# Patient Record
Sex: FEMALE | URBAN - METROPOLITAN AREA
[De-identification: names, ages, dates, MRNs, and addresses within clinical notes are randomized per-mention and may not be internally consistent; named-entity substitution may affect disease eponyms.]

---

## 2017-11-18 ENCOUNTER — EMERGENCY (EMERGENCY)
Facility: HOSPITAL | Age: 62
LOS: 1 days | Discharge: HOME | End: 2017-11-18
Attending: EMERGENCY MEDICINE
Payer: MEDICARE

## 2017-11-18 VITALS
DIASTOLIC BLOOD PRESSURE: 71 MMHG | WEIGHT: 149.91 LBS | SYSTOLIC BLOOD PRESSURE: 127 MMHG | TEMPERATURE: 98 F | HEART RATE: 66 BPM | RESPIRATION RATE: 16 BRPM | HEIGHT: 66 IN | OXYGEN SATURATION: 97 %

## 2017-11-18 DIAGNOSIS — F17.200 NICOTINE DEPENDENCE, UNSPECIFIED, UNCOMPLICATED: ICD-10-CM

## 2017-11-18 DIAGNOSIS — R55 SYNCOPE AND COLLAPSE: ICD-10-CM

## 2017-11-18 DIAGNOSIS — R42 DIZZINESS AND GIDDINESS: ICD-10-CM

## 2017-11-18 PROCEDURE — 99285 EMERGENCY DEPT VISIT HI MDM: CPT | Mod: 25

## 2017-11-19 VITALS
HEART RATE: 54 BPM | RESPIRATION RATE: 18 BRPM | DIASTOLIC BLOOD PRESSURE: 55 MMHG | SYSTOLIC BLOOD PRESSURE: 158 MMHG | TEMPERATURE: 98 F | OXYGEN SATURATION: 98 %

## 2017-11-19 LAB
ALBUMIN SERPL ELPH-MCNC: 3.4 G/DL — LOW (ref 3.5–5)
ALP SERPL-CCNC: 59 U/L — SIGNIFICANT CHANGE UP (ref 40–120)
ALT FLD-CCNC: 29 U/L DA — SIGNIFICANT CHANGE UP (ref 10–60)
ANION GAP SERPL CALC-SCNC: 10 MMOL/L — SIGNIFICANT CHANGE UP (ref 5–17)
APTT BLD: 30.5 SEC — SIGNIFICANT CHANGE UP (ref 27.5–37.4)
AST SERPL-CCNC: 22 U/L — SIGNIFICANT CHANGE UP (ref 10–40)
BASOPHILS # BLD AUTO: 0.1 K/UL — SIGNIFICANT CHANGE UP (ref 0–0.2)
BASOPHILS NFR BLD AUTO: 1.2 % — SIGNIFICANT CHANGE UP (ref 0–2)
BILIRUB SERPL-MCNC: 0.1 MG/DL — LOW (ref 0.2–1.2)
BUN SERPL-MCNC: 15 MG/DL — SIGNIFICANT CHANGE UP (ref 7–18)
CALCIUM SERPL-MCNC: 9.5 MG/DL — SIGNIFICANT CHANGE UP (ref 8.4–10.5)
CHLORIDE SERPL-SCNC: 105 MMOL/L — SIGNIFICANT CHANGE UP (ref 96–108)
CO2 SERPL-SCNC: 27 MMOL/L — SIGNIFICANT CHANGE UP (ref 22–31)
CREAT SERPL-MCNC: 0.95 MG/DL — SIGNIFICANT CHANGE UP (ref 0.5–1.3)
EOSINOPHIL # BLD AUTO: 0.3 K/UL — SIGNIFICANT CHANGE UP (ref 0–0.5)
EOSINOPHIL NFR BLD AUTO: 3.5 % — SIGNIFICANT CHANGE UP (ref 0–6)
GLUCOSE SERPL-MCNC: 116 MG/DL — HIGH (ref 70–99)
HCT VFR BLD CALC: 44.5 % — SIGNIFICANT CHANGE UP (ref 34.5–45)
HGB BLD-MCNC: 14.3 G/DL — SIGNIFICANT CHANGE UP (ref 11.5–15.5)
INR BLD: 1.05 RATIO — SIGNIFICANT CHANGE UP (ref 0.88–1.16)
LYMPHOCYTES # BLD AUTO: 2.5 K/UL — SIGNIFICANT CHANGE UP (ref 1–3.3)
LYMPHOCYTES # BLD AUTO: 32.7 % — SIGNIFICANT CHANGE UP (ref 13–44)
MCHC RBC-ENTMCNC: 30 PG — SIGNIFICANT CHANGE UP (ref 27–34)
MCHC RBC-ENTMCNC: 32.1 GM/DL — SIGNIFICANT CHANGE UP (ref 32–36)
MCV RBC AUTO: 93.3 FL — SIGNIFICANT CHANGE UP (ref 80–100)
MONOCYTES # BLD AUTO: 0.3 K/UL — SIGNIFICANT CHANGE UP (ref 0–0.9)
MONOCYTES NFR BLD AUTO: 4.3 % — SIGNIFICANT CHANGE UP (ref 2–14)
NEUTROPHILS # BLD AUTO: 4.4 K/UL — SIGNIFICANT CHANGE UP (ref 1.8–7.4)
NEUTROPHILS NFR BLD AUTO: 58.4 % — SIGNIFICANT CHANGE UP (ref 43–77)
PLATELET # BLD AUTO: 275 K/UL — SIGNIFICANT CHANGE UP (ref 150–400)
POTASSIUM SERPL-MCNC: 4 MMOL/L — SIGNIFICANT CHANGE UP (ref 3.5–5.3)
POTASSIUM SERPL-SCNC: 4 MMOL/L — SIGNIFICANT CHANGE UP (ref 3.5–5.3)
PROT SERPL-MCNC: 7.5 G/DL — SIGNIFICANT CHANGE UP (ref 6–8.3)
PROTHROM AB SERPL-ACNC: 11.5 SEC — SIGNIFICANT CHANGE UP (ref 9.8–12.7)
RBC # BLD: 4.77 M/UL — SIGNIFICANT CHANGE UP (ref 3.8–5.2)
RBC # FLD: 12.9 % — SIGNIFICANT CHANGE UP (ref 10.3–14.5)
SODIUM SERPL-SCNC: 142 MMOL/L — SIGNIFICANT CHANGE UP (ref 135–145)
WBC # BLD: 7.6 K/UL — SIGNIFICANT CHANGE UP (ref 3.8–10.5)
WBC # FLD AUTO: 7.6 K/UL — SIGNIFICANT CHANGE UP (ref 3.8–10.5)

## 2017-11-19 PROCEDURE — 36415 COLL VENOUS BLD VENIPUNCTURE: CPT

## 2017-11-19 PROCEDURE — 85027 COMPLETE CBC AUTOMATED: CPT

## 2017-11-19 PROCEDURE — 93005 ELECTROCARDIOGRAM TRACING: CPT

## 2017-11-19 PROCEDURE — 99284 EMERGENCY DEPT VISIT MOD MDM: CPT | Mod: 25

## 2017-11-19 PROCEDURE — 80053 COMPREHEN METABOLIC PANEL: CPT

## 2017-11-19 PROCEDURE — 70450 CT HEAD/BRAIN W/O DYE: CPT | Mod: 26

## 2017-11-19 PROCEDURE — 85730 THROMBOPLASTIN TIME PARTIAL: CPT

## 2017-11-19 PROCEDURE — 70450 CT HEAD/BRAIN W/O DYE: CPT

## 2017-11-19 PROCEDURE — 85610 PROTHROMBIN TIME: CPT

## 2017-11-19 RX ORDER — DIAZEPAM 5 MG
5 TABLET ORAL ONCE
Qty: 0 | Refills: 0 | Status: DISCONTINUED | OUTPATIENT
Start: 2017-11-19 | End: 2017-11-19

## 2017-11-19 RX ADMIN — Medication 5 MILLIGRAM(S): at 03:03

## 2017-11-19 NOTE — ED PROVIDER NOTE - NS ED MD EM SELECTION
Hermann Webb is a 70 year old male presenting for   Chief Complaint   Patient presents with   • Medicare Wellness Visit     Subsequent- Last Colonoscopy 2009   • Physical     routine physical- no new concerns today     Denies Latex allergy or sensitivity.    Medication verified and med list updated  Refills needed today: No    Health Maintenance Summary     Topic Due On Due Status Completed On Postpone Until Reason    Colorectal Cancer Screening - Colonoscopy Aug 28, 2019 Not Due Aug 28, 2009      Immunization - Td/Tdap Feb [birth year] 1965 Postponed  Mar 7, 2018 Insurance or Financial    Immunization-Zoster  Completed Nov 22, 2013      Immunization - Pneumococcal  Completed Mar 7, 2016      Abdominal Aortic Aneurysm (AAA) Screening   Completed Feb 26, 2014      Medicare Wellness Visit Mar 7, 2017 Due On Mar 7, 2016      Immunization-Influenza  Completed Oct 31, 2016          Patient is up to date, no discussion needed.    Last lab results:   No results found for: HGBA1C  CHOLESTEROL (mg/dL)   Date Value   10/07/2014 212 (H)     HDL (mg/dL)   Date Value   10/07/2014 50     TRIGLYCERIDE (mg/dL)   Date Value   10/07/2014 80     CALCULATED LDL (mg/dL)   Date Value   10/07/2014 146 (H)     No results found for: URMIC, UCR, MALBCR  No results found for: IFOB         [id number] Detailed

## 2017-11-19 NOTE — ED PROVIDER NOTE - PROGRESS NOTE DETAILS
Candis QUIÑONEZ: Patient reassessed and reports she feels better and is ready to go home. Patient will follow up with ENT.

## 2017-11-19 NOTE — ED PROVIDER NOTE - OBJECTIVE STATEMENT
Candis QUIÑONEZ: 61 y/o female with hx of Sarcoidosis with multiple organ involvement here with vertigo and syncope. Patient reports about 2 weeks of recurrent vertigo followed up by passing out. Patient reports chronic hx of L otitis media for which she has seen her PMD in the past. Patient reports her symptoms begin with some L ear discomfort that progresses to vertigo. Patient was prescribed Meclizine and is not helping. Denies fever, HA, neck pain, ataxia, fall, head trauma, N/V/D, focal weakness, visual changes, or paresthesias. Exam shows a female with EOMI, PERRL. Normal orpharynx, Clear lungs and CN II - XII intact. No Ataxia noted. Consider BPPV, Menieres disease, IC tumor, Electolyte abnormality, Cardiac arrthythmia. Plan CBC, CMP, CT head and EKG and reassess. Candis QUIÑONEZ: 63 y/o female with hx of Sarcoidosis with multiple organ involvement here with vertigo and syncope. Patient reports about 2 weeks of recurrent vertigo followed up by passing out. Patient reports chronic hx of L otitis media for which she has seen her PMD in the past. Patient reports her symptoms begin with some L ear discomfort that progresses to vertigo. Patient was prescribed Meclizine and is not helping. Denies fever, HA, neck pain, ataxia, fall, head trauma, N/V/D, focal weakness, visual changes, or paresthesias. Exam shows a female with EOMI, PERRL. Normal oropharynx, Clear lungs and CN II - XII intact. No Ataxia noted. Consider BPPV, Menieres disease, IC tumor, Electolyte abnormality, Cardiac arrthythmia. Plan CBC, CMP, CT head and EKG and reassess.

## 2017-11-19 NOTE — ED PROVIDER NOTE - MEDICAL DECISION MAKING DETAILS
Candis QUIÑONEZ: 61 y/o female with hx of Sarcoidosis with multiple organ involvement here with vertigo and syncope. Patient reports about 2 weeks of recurrent vertigo followed up by passing out. Patient reports chronic hx of L otitis media for which she has seen her PMD in the past. Patient reports her symptoms begin with some L ear discomfort that progresses to vertigo. Patient was prescribed Meclizine and is not helping. Denies fever, HA, neck pain, ataxia, fall, head trauma, N/V/D, focal weakness, visual changes, or paresthesias. Exam shows a female with EOMI, PERRL. Normal orpharynx, Clear lungs and CN II - XII intact. No Ataxia noted. Consider BPPV, Menieres disease, IC tumor, Electolyte abnormality, Cardiac arrthythmia. Plan CBC, CMP, CT head and EKG and reassess.

## 2018-01-04 ENCOUNTER — APPOINTMENT (OUTPATIENT)
Dept: OPHTHALMOLOGY | Facility: CLINIC | Age: 63
End: 2018-01-04

## 2018-01-09 ENCOUNTER — APPOINTMENT (OUTPATIENT)
Dept: OPHTHALMOLOGY | Facility: CLINIC | Age: 63
End: 2018-01-09
Payer: MEDICARE

## 2018-01-09 PROCEDURE — 92083 EXTENDED VISUAL FIELD XM: CPT

## 2018-01-09 PROCEDURE — 92004 COMPRE OPH EXAM NEW PT 1/>: CPT

## 2018-01-09 PROCEDURE — 92134 CPTRZ OPH DX IMG PST SGM RTA: CPT

## 2018-03-29 ENCOUNTER — APPOINTMENT (OUTPATIENT)
Dept: OPHTHALMOLOGY | Facility: CLINIC | Age: 63
End: 2018-03-29

## 2018-06-14 ENCOUNTER — APPOINTMENT (OUTPATIENT)
Dept: OPHTHALMOLOGY | Facility: CLINIC | Age: 63
End: 2018-06-14

## 2018-08-03 ENCOUNTER — EMERGENCY (EMERGENCY)
Facility: HOSPITAL | Age: 63
LOS: 1 days | Discharge: HOME | End: 2018-08-03
Attending: EMERGENCY MEDICINE
Payer: MEDICARE

## 2018-08-03 VITALS
DIASTOLIC BLOOD PRESSURE: 84 MMHG | TEMPERATURE: 98 F | RESPIRATION RATE: 16 BRPM | WEIGHT: 149.91 LBS | HEART RATE: 95 BPM | SYSTOLIC BLOOD PRESSURE: 150 MMHG | OXYGEN SATURATION: 99 % | HEIGHT: 66 IN

## 2018-08-03 PROCEDURE — 99282 EMERGENCY DEPT VISIT SF MDM: CPT

## 2018-08-03 NOTE — ED PROVIDER NOTE - LEFT EAR
earlobe to L side has clean/dry/healed split laceration to lobe.  No bleeding, no swelling, no redness.

## 2018-08-03 NOTE — ED PROVIDER NOTE - OBJECTIVE STATEMENT
63 F requesting closure of her split earlobe.  Patient unsure when it fully split, split where her earing was located.  Patient denies any bleeding.  No other complaints.

## 2018-08-04 PROBLEM — D86.9 SARCOIDOSIS, UNSPECIFIED: Chronic | Status: ACTIVE | Noted: 2017-11-24

## 2018-08-25 ENCOUNTER — INPATIENT (INPATIENT)
Facility: HOSPITAL | Age: 63
LOS: 12 days | Discharge: HOME HEALTH | End: 2018-09-07
Attending: INTERNAL MEDICINE | Admitting: INTERNAL MEDICINE
Payer: MEDICARE

## 2018-08-25 ENCOUNTER — RESULT REVIEW (OUTPATIENT)
Age: 63
End: 2018-08-25

## 2018-08-25 VITALS — WEIGHT: 154.32 LBS

## 2018-08-25 DIAGNOSIS — J96.90 RESPIRATORY FAILURE, UNSPECIFIED, UNSPECIFIED WHETHER WITH HYPOXIA OR HYPERCAPNIA: ICD-10-CM

## 2018-08-25 LAB
ALBUMIN SERPL ELPH-MCNC: 3.7 G/DL — SIGNIFICANT CHANGE UP (ref 3.3–5)
ALP SERPL-CCNC: 63 U/L — SIGNIFICANT CHANGE UP (ref 40–120)
ALT FLD-CCNC: 20 U/L — SIGNIFICANT CHANGE UP (ref 4–33)
ANISOCYTOSIS BLD QL: SLIGHT — SIGNIFICANT CHANGE UP
APAP SERPL-MCNC: < 15 UG/ML — LOW (ref 15–25)
APPEARANCE UR: CLEAR — SIGNIFICANT CHANGE UP
APTT BLD: 31.6 SEC — SIGNIFICANT CHANGE UP (ref 27.5–37.4)
AST SERPL-CCNC: 53 U/L — HIGH (ref 4–32)
BASE EXCESS BLDA CALC-SCNC: -2.5 MMOL/L — SIGNIFICANT CHANGE UP
BASE EXCESS BLDA CALC-SCNC: -4.7 MMOL/L — SIGNIFICANT CHANGE UP
BASE EXCESS BLDV CALC-SCNC: -4.1 MMOL/L — SIGNIFICANT CHANGE UP
BASE EXCESS BLDV CALC-SCNC: -5.1 MMOL/L — SIGNIFICANT CHANGE UP
BASOPHILS # BLD AUTO: 0.09 K/UL — SIGNIFICANT CHANGE UP (ref 0–0.2)
BASOPHILS NFR BLD AUTO: 0.8 % — SIGNIFICANT CHANGE UP (ref 0–2)
BASOPHILS NFR SPEC: 0 % — SIGNIFICANT CHANGE UP (ref 0–2)
BILIRUB SERPL-MCNC: 0.2 MG/DL — SIGNIFICANT CHANGE UP (ref 0.2–1.2)
BILIRUB UR-MCNC: NEGATIVE — SIGNIFICANT CHANGE UP
BLOOD GAS VENOUS - CREATININE: 1.12 MG/DL — SIGNIFICANT CHANGE UP (ref 0.5–1.3)
BLOOD GAS VENOUS - CREATININE: 1.16 MG/DL — SIGNIFICANT CHANGE UP (ref 0.5–1.3)
BLOOD UR QL VISUAL: SIGNIFICANT CHANGE UP
BODY FLUID TYPE: SIGNIFICANT CHANGE UP
BUN SERPL-MCNC: 16 MG/DL — SIGNIFICANT CHANGE UP (ref 7–23)
CA-I BLDA-SCNC: 1.14 MMOL/L — LOW (ref 1.15–1.29)
CALCIUM SERPL-MCNC: 9.2 MG/DL — SIGNIFICANT CHANGE UP (ref 8.4–10.5)
CHLORIDE BLDV-SCNC: 105 MMOL/L — SIGNIFICANT CHANGE UP (ref 96–108)
CHLORIDE BLDV-SCNC: 110 MMOL/L — HIGH (ref 96–108)
CHLORIDE SERPL-SCNC: 103 MMOL/L — SIGNIFICANT CHANGE UP (ref 98–107)
CLARITY SPEC: SIGNIFICANT CHANGE UP
CO2 SERPL-SCNC: 18 MMOL/L — LOW (ref 22–31)
COLOR FLD: SIGNIFICANT CHANGE UP
COLOR SPEC: YELLOW — SIGNIFICANT CHANGE UP
CORTIS SERPL-MCNC: 15.5 UG/DL — SIGNIFICANT CHANGE UP (ref 2.7–18.4)
CREAT SERPL-MCNC: 1.09 MG/DL — SIGNIFICANT CHANGE UP (ref 0.5–1.3)
EOSINOPHIL # BLD AUTO: 0.69 K/UL — HIGH (ref 0–0.5)
EOSINOPHIL NFR BLD AUTO: 6.1 % — HIGH (ref 0–6)
EOSINOPHIL NFR FLD: 5 % — SIGNIFICANT CHANGE UP (ref 0–6)
ETHANOL BLD-MCNC: < 10 MG/DL — SIGNIFICANT CHANGE UP
GAS PNL BLDV: 137 MMOL/L — SIGNIFICANT CHANGE UP (ref 136–146)
GAS PNL BLDV: 140 MMOL/L — SIGNIFICANT CHANGE UP (ref 136–146)
GIANT PLATELETS BLD QL SMEAR: PRESENT — SIGNIFICANT CHANGE UP
GLUCOSE BLDA-MCNC: 105 MG/DL — HIGH (ref 70–99)
GLUCOSE BLDA-MCNC: 112 MG/DL — HIGH (ref 70–99)
GLUCOSE BLDV-MCNC: 134 — HIGH (ref 70–99)
GLUCOSE BLDV-MCNC: 308 — HIGH (ref 70–99)
GLUCOSE SERPL-MCNC: 268 MG/DL — HIGH (ref 70–99)
GLUCOSE UR-MCNC: 500 — SIGNIFICANT CHANGE UP
GRAM STN SPT: SIGNIFICANT CHANGE UP
GRAM STN SPT: SIGNIFICANT CHANGE UP
HCO3 BLDA-SCNC: 20 MMOL/L — LOW (ref 22–26)
HCO3 BLDA-SCNC: 22 MMOL/L — SIGNIFICANT CHANGE UP (ref 22–26)
HCO3 BLDV-SCNC: 15 MMOL/L — LOW (ref 20–27)
HCO3 BLDV-SCNC: 19 MMOL/L — LOW (ref 20–27)
HCT VFR BLD CALC: 45.2 % — HIGH (ref 34.5–45)
HCT VFR BLDA CALC: 34.1 % — LOW (ref 34.5–46.5)
HCT VFR BLDA CALC: 37.9 % — SIGNIFICANT CHANGE UP (ref 34.5–46.5)
HCT VFR BLDV CALC: 38.1 % — SIGNIFICANT CHANGE UP (ref 34.5–45)
HCT VFR BLDV CALC: 45.9 % — HIGH (ref 34.5–45)
HGB BLD-MCNC: 13.7 G/DL — SIGNIFICANT CHANGE UP (ref 11.5–15.5)
HGB BLDA-MCNC: 11.1 G/DL — LOW (ref 11.5–15.5)
HGB BLDA-MCNC: 12.3 G/DL — SIGNIFICANT CHANGE UP (ref 11.5–15.5)
HGB BLDV-MCNC: 12.4 G/DL — SIGNIFICANT CHANGE UP (ref 11.5–15.5)
HGB BLDV-MCNC: 15 G/DL — SIGNIFICANT CHANGE UP (ref 11.5–15.5)
HYALINE CASTS # UR AUTO: SIGNIFICANT CHANGE UP
IMM GRANULOCYTES # BLD AUTO: 0.04 # — SIGNIFICANT CHANGE UP
IMM GRANULOCYTES NFR BLD AUTO: 0.4 % — SIGNIFICANT CHANGE UP (ref 0–1.5)
INR BLD: 1.02 — SIGNIFICANT CHANGE UP (ref 0.88–1.17)
KETONES UR-MCNC: NEGATIVE — SIGNIFICANT CHANGE UP
LACTATE BLDA-SCNC: 1.7 MMOL/L — SIGNIFICANT CHANGE UP (ref 0.5–2)
LACTATE BLDA-SCNC: 1.8 MMOL/L — SIGNIFICANT CHANGE UP (ref 0.5–2)
LACTATE BLDV-MCNC: 3 MMOL/L — HIGH (ref 0.5–2)
LACTATE BLDV-MCNC: 3.6 MMOL/L — HIGH (ref 0.5–2)
LEUKOCYTE ESTERASE UR-ACNC: SIGNIFICANT CHANGE UP
LYMPHOCYTES # BLD AUTO: 5.72 K/UL — HIGH (ref 1–3.3)
LYMPHOCYTES # BLD AUTO: 50.9 % — HIGH (ref 13–44)
LYMPHOCYTES NFR FLD: 11 % — SIGNIFICANT CHANGE UP
LYMPHOCYTES NFR SPEC AUTO: 47 % — HIGH (ref 13–44)
MACROPHAGES # FLD: 7 % — SIGNIFICANT CHANGE UP
MANUAL SMEAR VERIFICATION: SIGNIFICANT CHANGE UP
MCHC RBC-ENTMCNC: 29.4 PG — SIGNIFICANT CHANGE UP (ref 27–34)
MCHC RBC-ENTMCNC: 30.3 % — LOW (ref 32–36)
MCV RBC AUTO: 97 FL — SIGNIFICANT CHANGE UP (ref 80–100)
MONOCYTES # BLD AUTO: 1.14 K/UL — HIGH (ref 0–0.9)
MONOCYTES # FLD: 5 % — SIGNIFICANT CHANGE UP
MONOCYTES NFR BLD AUTO: 10.2 % — SIGNIFICANT CHANGE UP (ref 2–14)
MONOCYTES NFR BLD: 12 % — HIGH (ref 2–9)
MUCOUS THREADS # UR AUTO: SIGNIFICANT CHANGE UP
NEUTROPHIL AB SER-ACNC: 34 % — LOW (ref 43–77)
NEUTROPHILS # BLD AUTO: 3.55 K/UL — SIGNIFICANT CHANGE UP (ref 1.8–7.4)
NEUTROPHILS NFR BLD AUTO: 31.6 % — LOW (ref 43–77)
NEUTS SEG NFR FLD MANUAL: 77 % — SIGNIFICANT CHANGE UP
NITRITE UR-MCNC: NEGATIVE — SIGNIFICANT CHANGE UP
NRBC # BLD: 0 /100WBC — SIGNIFICANT CHANGE UP
NRBC # FLD: 0 — SIGNIFICANT CHANGE UP
NT-PROBNP SERPL-SCNC: 701.6 PG/ML — SIGNIFICANT CHANGE UP
OTHER CELLS FLD MANUAL: SIGNIFICANT CHANGE UP %
PCO2 BLDA: 42 MMHG — SIGNIFICANT CHANGE UP (ref 32–48)
PCO2 BLDA: 51 MMHG — HIGH (ref 32–48)
PCO2 BLDV: 77 MMHG — HIGH (ref 41–51)
PCO2 BLDV: > 110 MMHG — CRITICAL HIGH (ref 41–51)
PH BLDA: 7.25 PH — LOW (ref 7.35–7.45)
PH BLDA: 7.34 PH — LOW (ref 7.35–7.45)
PH BLDV: 6.97 PH — CRITICAL LOW (ref 7.32–7.43)
PH BLDV: 7.12 PH — CRITICAL LOW (ref 7.32–7.43)
PH UR: 5.5 — SIGNIFICANT CHANGE UP (ref 5–8)
PLATELET # BLD AUTO: 339 K/UL — SIGNIFICANT CHANGE UP (ref 150–400)
PLATELET COUNT - ESTIMATE: NORMAL — SIGNIFICANT CHANGE UP
PMV BLD: 9.5 FL — SIGNIFICANT CHANGE UP (ref 7–13)
PO2 BLDA: 163 MMHG — HIGH (ref 83–108)
PO2 BLDA: 73 MMHG — LOW (ref 83–108)
PO2 BLDV: 27 MMHG — LOW (ref 35–40)
PO2 BLDV: 49 MMHG — HIGH (ref 35–40)
POTASSIUM BLDA-SCNC: 3.9 MMOL/L — SIGNIFICANT CHANGE UP (ref 3.4–4.5)
POTASSIUM BLDA-SCNC: 5.2 MMOL/L — HIGH (ref 3.4–4.5)
POTASSIUM BLDV-SCNC: 4.1 MMOL/L — SIGNIFICANT CHANGE UP (ref 3.4–4.5)
POTASSIUM BLDV-SCNC: 6.3 MMOL/L — CRITICAL HIGH (ref 3.4–4.5)
POTASSIUM SERPL-MCNC: SIGNIFICANT CHANGE UP MMOL/L (ref 3.5–5.3)
POTASSIUM SERPL-SCNC: SIGNIFICANT CHANGE UP MMOL/L (ref 3.5–5.3)
PROT SERPL-MCNC: 7.5 G/DL — SIGNIFICANT CHANGE UP (ref 6–8.3)
PROT UR-MCNC: 30 — SIGNIFICANT CHANGE UP
PROTHROM AB SERPL-ACNC: 11.3 SEC — SIGNIFICANT CHANGE UP (ref 9.8–13.1)
RBC # BLD: 4.66 M/UL — SIGNIFICANT CHANGE UP (ref 3.8–5.2)
RBC # FLD: 14.1 % — SIGNIFICANT CHANGE UP (ref 10.3–14.5)
RBC CASTS # UR COMP ASSIST: SIGNIFICANT CHANGE UP (ref 0–?)
REVIEW TO FOLLOW: YES — SIGNIFICANT CHANGE UP
SALICYLATES SERPL-MCNC: < 5 MG/DL — LOW (ref 15–30)
SAO2 % BLDA: 95.4 % — SIGNIFICANT CHANGE UP (ref 95–99)
SAO2 % BLDA: 99.2 % — HIGH (ref 95–99)
SAO2 % BLDV: 21.2 % — LOW (ref 60–85)
SAO2 % BLDV: 70.3 % — SIGNIFICANT CHANGE UP (ref 60–85)
SODIUM BLDA-SCNC: 136 MMOL/L — SIGNIFICANT CHANGE UP (ref 136–146)
SODIUM BLDA-SCNC: 138 MMOL/L — SIGNIFICANT CHANGE UP (ref 136–146)
SODIUM SERPL-SCNC: 137 MMOL/L — SIGNIFICANT CHANGE UP (ref 135–145)
SP GR SPEC: 1.03 — SIGNIFICANT CHANGE UP (ref 1–1.04)
SPECIMEN SOURCE: SIGNIFICANT CHANGE UP
SPECIMEN SOURCE: SIGNIFICANT CHANGE UP
SQUAMOUS # UR AUTO: SIGNIFICANT CHANGE UP
TOTAL CELLS COUNTED, BODY FLUID: 100 CELLS — SIGNIFICANT CHANGE UP
TROPONIN T, HIGH SENSITIVITY: 13 NG/L — SIGNIFICANT CHANGE UP (ref ?–14)
UROBILINOGEN FLD QL: NORMAL — SIGNIFICANT CHANGE UP
VARIANT LYMPHS # BLD: 2 % — SIGNIFICANT CHANGE UP
WBC # BLD: 11.23 K/UL — HIGH (ref 3.8–10.5)
WBC # FLD AUTO: 11.23 K/UL — HIGH (ref 3.8–10.5)
WBC UR QL: SIGNIFICANT CHANGE UP (ref 0–?)

## 2018-08-25 PROCEDURE — 88112 CYTOPATH CELL ENHANCE TECH: CPT | Mod: 26

## 2018-08-25 PROCEDURE — 88305 TISSUE EXAM BY PATHOLOGIST: CPT | Mod: 26

## 2018-08-25 PROCEDURE — 71045 X-RAY EXAM CHEST 1 VIEW: CPT | Mod: 26

## 2018-08-25 PROCEDURE — 76937 US GUIDE VASCULAR ACCESS: CPT | Mod: 26

## 2018-08-25 PROCEDURE — 70450 CT HEAD/BRAIN W/O DYE: CPT | Mod: 26

## 2018-08-25 PROCEDURE — 31622 DX BRONCHOSCOPE/WASH: CPT

## 2018-08-25 PROCEDURE — 36010 PLACE CATHETER IN VEIN: CPT

## 2018-08-25 PROCEDURE — 99291 CRITICAL CARE FIRST HOUR: CPT

## 2018-08-25 RX ORDER — ETOMIDATE 2 MG/ML
20 INJECTION INTRAVENOUS ONCE
Qty: 0 | Refills: 0 | Status: COMPLETED | OUTPATIENT
Start: 2018-08-25 | End: 2018-08-25

## 2018-08-25 RX ORDER — AZITHROMYCIN 500 MG/1
500 TABLET, FILM COATED ORAL DAILY
Qty: 0 | Refills: 0 | Status: DISCONTINUED | OUTPATIENT
Start: 2018-08-25 | End: 2018-08-26

## 2018-08-25 RX ORDER — VANCOMYCIN HCL 1 G
1000 VIAL (EA) INTRAVENOUS ONCE
Qty: 0 | Refills: 0 | Status: DISCONTINUED | OUTPATIENT
Start: 2018-08-25 | End: 2018-08-25

## 2018-08-25 RX ORDER — SODIUM CHLORIDE 9 MG/ML
3000 INJECTION INTRAMUSCULAR; INTRAVENOUS; SUBCUTANEOUS ONCE
Qty: 0 | Refills: 0 | Status: COMPLETED | OUTPATIENT
Start: 2018-08-25 | End: 2018-08-25

## 2018-08-25 RX ORDER — MIDAZOLAM HYDROCHLORIDE 1 MG/ML
2 INJECTION, SOLUTION INTRAMUSCULAR; INTRAVENOUS ONCE
Qty: 0 | Refills: 0 | Status: DISCONTINUED | OUTPATIENT
Start: 2018-08-25 | End: 2018-08-25

## 2018-08-25 RX ORDER — MIDAZOLAM HYDROCHLORIDE 1 MG/ML
4 INJECTION, SOLUTION INTRAMUSCULAR; INTRAVENOUS ONCE
Qty: 0 | Refills: 0 | Status: DISCONTINUED | OUTPATIENT
Start: 2018-08-25 | End: 2018-08-25

## 2018-08-25 RX ORDER — VANCOMYCIN HCL 1 G
1000 VIAL (EA) INTRAVENOUS ONCE
Qty: 0 | Refills: 0 | Status: COMPLETED | OUTPATIENT
Start: 2018-08-25 | End: 2018-08-25

## 2018-08-25 RX ORDER — FENTANYL CITRATE 50 UG/ML
50 INJECTION INTRAVENOUS ONCE
Qty: 0 | Refills: 0 | Status: DISCONTINUED | OUTPATIENT
Start: 2018-08-25 | End: 2018-08-25

## 2018-08-25 RX ORDER — NOREPINEPHRINE BITARTRATE/D5W 8 MG/250ML
0.07 PLASTIC BAG, INJECTION (ML) INTRAVENOUS
Qty: 8 | Refills: 0 | Status: DISCONTINUED | OUTPATIENT
Start: 2018-08-25 | End: 2018-08-28

## 2018-08-25 RX ORDER — PIPERACILLIN AND TAZOBACTAM 4; .5 G/20ML; G/20ML
3.38 INJECTION, POWDER, LYOPHILIZED, FOR SOLUTION INTRAVENOUS EVERY 8 HOURS
Qty: 0 | Refills: 0 | Status: DISCONTINUED | OUTPATIENT
Start: 2018-08-25 | End: 2018-09-01

## 2018-08-25 RX ORDER — FUROSEMIDE 40 MG
40 TABLET ORAL ONCE
Qty: 0 | Refills: 0 | Status: COMPLETED | OUTPATIENT
Start: 2018-08-25 | End: 2018-08-25

## 2018-08-25 RX ORDER — ALBUTEROL 90 UG/1
2 AEROSOL, METERED ORAL EVERY 6 HOURS
Qty: 0 | Refills: 0 | Status: DISCONTINUED | OUTPATIENT
Start: 2018-08-25 | End: 2018-09-07

## 2018-08-25 RX ORDER — SUCCINYLCHOLINE CHLORIDE 100 MG/5ML
100 SYRINGE (ML) INTRAVENOUS ONCE
Qty: 0 | Refills: 0 | Status: COMPLETED | OUTPATIENT
Start: 2018-08-25 | End: 2018-08-25

## 2018-08-25 RX ORDER — ENOXAPARIN SODIUM 100 MG/ML
40 INJECTION SUBCUTANEOUS EVERY 24 HOURS
Qty: 0 | Refills: 0 | Status: DISCONTINUED | OUTPATIENT
Start: 2018-08-25 | End: 2018-09-02

## 2018-08-25 RX ORDER — FENTANYL CITRATE 50 UG/ML
0.5 INJECTION INTRAVENOUS
Qty: 2500 | Refills: 0 | Status: DISCONTINUED | OUTPATIENT
Start: 2018-08-25 | End: 2018-08-28

## 2018-08-25 RX ORDER — IPRATROPIUM/ALBUTEROL SULFATE 18-103MCG
3 AEROSOL WITH ADAPTER (GRAM) INHALATION EVERY 6 HOURS
Qty: 0 | Refills: 0 | Status: DISCONTINUED | OUTPATIENT
Start: 2018-08-25 | End: 2018-08-25

## 2018-08-25 RX ORDER — PROPOFOL 10 MG/ML
10 INJECTION, EMULSION INTRAVENOUS
Qty: 1000 | Refills: 0 | Status: DISCONTINUED | OUTPATIENT
Start: 2018-08-25 | End: 2018-08-28

## 2018-08-25 RX ORDER — FENTANYL CITRATE 50 UG/ML
100 INJECTION INTRAVENOUS ONCE
Qty: 0 | Refills: 0 | Status: DISCONTINUED | OUTPATIENT
Start: 2018-08-25 | End: 2018-08-25

## 2018-08-25 RX ORDER — VANCOMYCIN HCL 1 G
VIAL (EA) INTRAVENOUS
Qty: 0 | Refills: 0 | Status: DISCONTINUED | OUTPATIENT
Start: 2018-08-25 | End: 2018-08-30

## 2018-08-25 RX ORDER — PIPERACILLIN AND TAZOBACTAM 4; .5 G/20ML; G/20ML
3.38 INJECTION, POWDER, LYOPHILIZED, FOR SOLUTION INTRAVENOUS ONCE
Qty: 0 | Refills: 0 | Status: COMPLETED | OUTPATIENT
Start: 2018-08-25 | End: 2018-08-25

## 2018-08-25 RX ORDER — IPRATROPIUM BROMIDE 0.2 MG/ML
1 SOLUTION, NON-ORAL INHALATION EVERY 6 HOURS
Qty: 0 | Refills: 0 | Status: DISCONTINUED | OUTPATIENT
Start: 2018-08-25 | End: 2018-09-06

## 2018-08-25 RX ORDER — HYDROCORTISONE 20 MG
100 TABLET ORAL EVERY 8 HOURS
Qty: 0 | Refills: 0 | Status: DISCONTINUED | OUTPATIENT
Start: 2018-08-25 | End: 2018-08-26

## 2018-08-25 RX ORDER — VANCOMYCIN HCL 1 G
1000 VIAL (EA) INTRAVENOUS EVERY 12 HOURS
Qty: 0 | Refills: 0 | Status: DISCONTINUED | OUTPATIENT
Start: 2018-08-25 | End: 2018-08-30

## 2018-08-25 RX ORDER — ENOXAPARIN SODIUM 100 MG/ML
40 INJECTION SUBCUTANEOUS DAILY
Qty: 0 | Refills: 0 | Status: DISCONTINUED | OUTPATIENT
Start: 2018-08-25 | End: 2018-08-25

## 2018-08-25 RX ADMIN — FENTANYL CITRATE 3.5 MICROGRAM(S)/KG/HR: 50 INJECTION INTRAVENOUS at 03:34

## 2018-08-25 RX ADMIN — Medication 1 PUFF(S): at 16:59

## 2018-08-25 RX ADMIN — Medication 100 MILLIGRAM(S): at 21:59

## 2018-08-25 RX ADMIN — MIDAZOLAM HYDROCHLORIDE 2 MILLIGRAM(S): 1 INJECTION, SOLUTION INTRAMUSCULAR; INTRAVENOUS at 06:17

## 2018-08-25 RX ADMIN — AZITHROMYCIN 250 MILLIGRAM(S): 500 TABLET, FILM COATED ORAL at 10:20

## 2018-08-25 RX ADMIN — Medication 250 MILLIGRAM(S): at 12:30

## 2018-08-25 RX ADMIN — SODIUM CHLORIDE 1500 MILLILITER(S): 9 INJECTION INTRAMUSCULAR; INTRAVENOUS; SUBCUTANEOUS at 05:18

## 2018-08-25 RX ADMIN — ETOMIDATE 20 MILLIGRAM(S): 2 INJECTION INTRAVENOUS at 03:04

## 2018-08-25 RX ADMIN — ENOXAPARIN SODIUM 40 MILLIGRAM(S): 100 INJECTION SUBCUTANEOUS at 10:30

## 2018-08-25 RX ADMIN — Medication 40 MILLIGRAM(S): at 10:20

## 2018-08-25 RX ADMIN — ALBUTEROL 2 PUFF(S): 90 AEROSOL, METERED ORAL at 16:54

## 2018-08-25 RX ADMIN — FENTANYL CITRATE 100 MICROGRAM(S): 50 INJECTION INTRAVENOUS at 17:35

## 2018-08-25 RX ADMIN — MIDAZOLAM HYDROCHLORIDE 2 MILLIGRAM(S): 1 INJECTION, SOLUTION INTRAMUSCULAR; INTRAVENOUS at 03:33

## 2018-08-25 RX ADMIN — PROPOFOL 4.2 MICROGRAM(S)/KG/MIN: 10 INJECTION, EMULSION INTRAVENOUS at 10:31

## 2018-08-25 RX ADMIN — Medication 100 MILLIGRAM(S): at 13:30

## 2018-08-25 RX ADMIN — Medication 5 MILLIGRAM(S): at 17:58

## 2018-08-25 RX ADMIN — MIDAZOLAM HYDROCHLORIDE 2 MILLIGRAM(S): 1 INJECTION, SOLUTION INTRAMUSCULAR; INTRAVENOUS at 03:43

## 2018-08-25 RX ADMIN — FENTANYL CITRATE 3.5 MICROGRAM(S)/KG/HR: 50 INJECTION INTRAVENOUS at 10:31

## 2018-08-25 RX ADMIN — Medication 100 MILLIGRAM(S): at 03:03

## 2018-08-25 RX ADMIN — SODIUM CHLORIDE 3000 MILLILITER(S): 9 INJECTION INTRAMUSCULAR; INTRAVENOUS; SUBCUTANEOUS at 06:17

## 2018-08-25 RX ADMIN — MIDAZOLAM HYDROCHLORIDE 4 MILLIGRAM(S): 1 INJECTION, SOLUTION INTRAMUSCULAR; INTRAVENOUS at 18:00

## 2018-08-25 RX ADMIN — PROPOFOL 4.2 MICROGRAM(S)/KG/MIN: 10 INJECTION, EMULSION INTRAVENOUS at 21:59

## 2018-08-25 RX ADMIN — Medication 9.19 MICROGRAM(S)/KG/MIN: at 06:58

## 2018-08-25 RX ADMIN — FENTANYL CITRATE 100 MICROGRAM(S): 50 INJECTION INTRAVENOUS at 19:05

## 2018-08-25 RX ADMIN — FENTANYL CITRATE 50 MICROGRAM(S): 50 INJECTION INTRAVENOUS at 03:34

## 2018-08-25 RX ADMIN — Medication 9.19 MICROGRAM(S)/KG/MIN: at 21:08

## 2018-08-25 RX ADMIN — Medication 1 PUFF(S): at 22:55

## 2018-08-25 RX ADMIN — FENTANYL CITRATE 50 MICROGRAM(S): 50 INJECTION INTRAVENOUS at 03:30

## 2018-08-25 RX ADMIN — Medication 9.19 MICROGRAM(S)/KG/MIN: at 10:30

## 2018-08-25 RX ADMIN — ALBUTEROL 2 PUFF(S): 90 AEROSOL, METERED ORAL at 22:55

## 2018-08-25 RX ADMIN — Medication 250 MILLIGRAM(S): at 23:47

## 2018-08-25 RX ADMIN — PIPERACILLIN AND TAZOBACTAM 200 GRAM(S): 4; .5 INJECTION, POWDER, LYOPHILIZED, FOR SOLUTION INTRAVENOUS at 10:20

## 2018-08-25 RX ADMIN — PIPERACILLIN AND TAZOBACTAM 25 GRAM(S): 4; .5 INJECTION, POWDER, LYOPHILIZED, FOR SOLUTION INTRAVENOUS at 17:56

## 2018-08-25 NOTE — H&P ADULT - HISTORY OF PRESENT ILLNESS
The patient is a 60 year old female with past medical history of sarcoidosis, PPM, and TIA (as per EMT), presenting with SOB and AMS. The patient was reportedly cleaning inside her house, suddenly became SOB and was unresponsive. Upon EMS arrival to home, the patient was hypoxic into 70s and hypotensive into 90s, placed on NRB and brought to ED. No LOC, no falls or trauma. The patient was intubated in the ED due to low oxygen saturation and AMS. CT Head was negative for an acute bleed. CXR showed severe pulmonary edema. The patient is a 60 year old female with past medical history of sarcoidosis, PPM, and TIA (as per EMT), presenting with SOB and AMS. The patient was reportedly cleaning inside her house, suddenly became SOB and was unresponsive. Upon EMS arrival to home, the patient was hypoxic into 70s and hypotensive into 90s, placed on NRB and brought to ED. No LOC, no falls or trauma. The patient was intubated in the ED due to low oxygen saturation and AMS. CT Head was negative for an acute bleed. CXR showed severe pulmonary edema.   Of note, this medical history is very limited due to patient being intubated and no contact information for family.

## 2018-08-25 NOTE — ED ADULT NURSE NOTE - CHIEF COMPLAINT QUOTE
Brought in by [facility] as unnotified Notification for respiratory distress, on non-rebreather, agonally breathing and decreased responsiveness per EMS, satting 80%.  Hx Lung sarcoidosis, PPM, HTN.  Brought Direct to [facility]

## 2018-08-25 NOTE — ED PROVIDER NOTE - OBJECTIVE STATEMENT
Pertinent PMH/PSH/FHx/SHx and Review of Systems contained within:  61yo F, w/ pmh of sarcoidosis, PPM, and TIA (as per EMT), bibems (without advance notification) for sob and AMS. Per EMS report, family states pt is normally AOX3, ambulatory at baseline, was cleaning inside the house, suddenly became sob and would not respond to questions. Upon EMS arrival to home, pt was hypoxic into 70s and hypotensive into 90s, placed on NRB and ricky to ED. No LOC, no falls or trauma. Unable to obtain ROS given AMS.

## 2018-08-25 NOTE — ED PROVIDER NOTE - MEDICAL DECISION MAKING DETAILS
61yo F w/ pmh of sarcoidosis, PPM, and TIAs, bibems for resp distress and AMS. Pt arrived with O2 sat in 70s-low 80s on NRB, awake but not alert and not responding to verbal or physical stimuli, tachypneic. Given low O2 sats and AMS, decision made to emergently intubate. Ddx includes hypercapneic hypoxic resp failure vs CVA vs sepsis vs ACS. Labs, imaging, likely MICU admission.

## 2018-08-25 NOTE — H&P ADULT - ATTENDING COMMENTS
Pt seen and examined at bedside with resident and fellow  61 yo woman with known sarcoid, PPM and h/o TIA; no other hx available at this time; has scar on L side of head from possible craniotomy??  BIBA after sudden SOB at home; hypoxia noted; in ED VBG 6.97/>110; pt intubated for hypercapnic respiratory failure; CXR with bilat diffuse fluffy infiltrates  POCUS bilat B lines and dense consolidations bilat post bases; LVF good squeeze, thickened LV and RV; RV size < LV  Hypercapnic resp failure in pt with sarcoid  - lung protective ventilation  - repeat ABG  - try to get more history from family or outside doc; w/u of sarcoid; steroids?  - Head CT neg; may need CTA head/neck  - no evidence of significant PE on POCUS;  - bilat pneumonia; send sputum; broad spectrum Abx; measure cortisol  Pt is critically ill; Critical care time spent 45 min exclusive of time spent doing US

## 2018-08-25 NOTE — CHART NOTE - NSCHARTNOTEFT_GEN_A_CORE
Collateral history obtained from sons, Andrea and Chevy, currently at bedside. Patient resides with son Chevy who was present when her symptoms began overnight. Son reports she c/o severe and sudden shortness of breath, appeared very anxious, and working hard to breathe. He does not recall patient having any cough, fevers or respiratory distress prior to last night. Patient has sarcoidosis and uses multiple inhalers however family states she is not always compliant with medications (for example, patient may not be taking prednisone for sarcoidosis as prescribed due to weight gain). Patient agreed for EMS to be called. While waiting for EMS, son noted she became more lethargic and drowsy, and had to keep arousing her until EMS arrived.  Patient was evaluated 2 days ago in [facility] ED for syncopal episode (found outside by neighbords, LOC transient, headstrike unclear) which is not a new problem. Patient experienced up to 8 episodes in 2-week period and received a pacemaker 7-8 months ago.     Of note, patient has been a smoker for at least 30 years (currently smokes 3-4 cigarettes/day). Collateral history obtained from sons, Andrea and Chevy, currently at bedside. Patient resides with son Chevy who was present when her symptoms began overnight. Son reports she c/o severe and sudden SOB, appeared very anxious, and working hard to breathe. He does not recall patient having any cough, fevers or respiratory distress prior to last night. Patient has sarcoidosis and uses multiple inhalers however family states she is not always compliant with medications (for example, prednisone for sarcoidosis as prescribed due to weight gain). Patient agreed for EMS to be called. While waiting for EMS, son noted she became more lethargic and drowsy, and had to keep arousing her until EMS arrived.  Patient evaluated 2 days ago in [facility] ED for syncopal episode (found outside by neighbors, LOC transient, headstrike unclear) which is not a new problem. Patient experienced up to 8 episodes in 2-week period and received a pacemaker 7-8 months ago.     Of note, patient has been a smoker for at least 30 years (currently smokes 3-4 cigarettes/day). Collateral history obtained from sons, Andrea and Chevy, currently at bedside. Patient resides with son Chevy who was present when her symptoms began overnight. Son reports she c/o severe and sudden SOB, appeared very anxious, and working hard to breathe. He does not recall patient having any cough, fevers or respiratory distress prior to last night. Patient has sarcoidosis and uses multiple inhalers however family states she is not always compliant with medications (for example, prednisone for sarcoidosis as prescribed due to weight gain). Patient agreed for EMS to be called. While waiting for EMS, son noted she became more lethargic and drowsy, and had to keep arousing her until EMS arrived.  Patient evaluated 2 days ago in [facility] ED for syncopal episode (found outside by neighbors, LOC transient, headstrike unclear) which is not a new problem. Patient experienced up to 8 episodes in 2-week period and received a pacemaker 7-8 months ago. At baseline patient ambulates without walker/cane    Of note, patient has been a smoker for at least 30 years (currently smokes 3-4 cigarettes/day). Collateral history obtained from sons, Andrea and Chevy, currently at bedside. Patient resides with son Chevy who was present when her symptoms began overnight. Son reports she c/o severe and sudden SOB, appeared very anxious, and working hard to breathe. He does not recall patient having any cough, fevers or respiratory distress prior to last night. Patient has sarcoidosis and uses multiple inhalers however family states she is not always compliant with medications (for example, prednisone for sarcoidosis as prescribed due to weight gain). Patient agreed for EMS to be called. While waiting for EMS, son noted she became more lethargic and drowsy, and had to keep arousing her until EMS arrived.  Patient evaluated 2 days ago in [facility] ED for syncopal episode (found outside by neighbors, LOC transient, headstrike unclear) which is not a new problem. Patient experienced up to 8 episodes in 2-week period and received a pacemaker 7-8 months ago. At baseline patient ambulates without walker/cane, capable of ADLs independently however sons suggest should be receiving supervision/additional care given possible medication non-compliance and history of syncope. Of note, patient has been a smoker for at least 30 years (currently smokes 3-4 cigarettes/day). Tentative medication list obtained from Prescription Writer. Sons are unclear as to exact medical conditions and medications patient but confirm patient is on multiple inhalers and prednisone for sarcoidosis and takes pain medications for "arthritis." They are unaware patient is prescribed clonazepam and unable to clarify Pharmacy patient obtains medications from.     Per iSTOP patient is taking oxycodone IR 30 Collateral history obtained from sons, Andrea and Chevy at bedside:     Patient resides with son Chevy who was present when her symptoms began overnight. Son reports she c/o severe and sudden SOB, appeared very anxious, and working hard to breathe. He does not recall patient having any cough, fevers or respiratory distress prior to last night. Patient has sarcoidosis and uses multiple inhalers however family states she is not always compliant with medications (for example, prednisone for sarcoidosis as prescribed due to weight gain). Patient agreed for EMS to be called. While waiting for EMS, son noted she became more lethargic and drowsy, and had to keep arousing her until EMS arrived.  Patient evaluated 2 days ago in [facility] ED for syncopal episode (found outside by neighbors, LOC transient, headstrike unclear) which is not a new problem. Patient experienced up to 8 episodes in 2-week period and received a pacemaker 7-8 months ago. At baseline patient ambulates without walker/cane, capable of ADLs independently however sons suggest should be receiving supervision/additional care given possible medication non-compliance and history of syncope. Of note, patient has been a smoker for at least 30 years (currently smokes 3-4 cigarettes/day). Tentative medication list obtained from Prescription Writer. Sons are unclear as to exact medical conditions and medications patient but confirm patient is on multiple inhalers and prednisone for sarcoidosis and takes pain medications for "arthritis." They are unaware patient is prescribed clonazepam, unable to clarify patient's Pharmacy, PCP and/or Specialists.      Patient prescribed oxycodone IR 30 q6h PRN; clonazepam 1 q12h; zolpidem 10 qhs daily by Dr. Nick Caballreo. iSTOP reference #[id number] (placed in patient chart). Patient has alternate MRN due to alternate spelling of name: Pamela Sam    Collateral history obtained from sons, Andrea and Chevy at bedside:     Patient resides with son Chevy who was present when her symptoms began overnight. Son reports she c/o severe and sudden SOB, appeared very anxious, and working hard to breathe. He does not recall patient having any cough, fevers or respiratory distress prior to last night. Patient has sarcoidosis and uses multiple inhalers however family states she is not always compliant with medications (for example, prednisone for sarcoidosis as prescribed due to weight gain). Patient agreed for EMS to be called. While waiting for EMS, son noted she became more lethargic and drowsy, and had to keep arousing her until EMS arrived.  Patient evaluated 2 days ago in [facility] ED for syncopal episode (found outside by neighbors, LOC transient, headstrike unclear) which is not a new problem. Patient experienced up to 8 episodes in 2-week period and received a pacemaker 7-8 months ago. At baseline patient ambulates without walker/cane, capable of ADLs independently however sons suggest should be receiving supervision/additional care given possible medication non-compliance and history of syncope. Of note, patient has been a smoker for at least 30 years (currently smokes 3-4 cigarettes/day). Tentative medication list obtained from Prescription Writer. Sons are unclear as to exact medical conditions and medications patient but confirm patient is on multiple inhalers and prednisone for sarcoidosis and takes pain medications for "arthritis." They are unaware patient is prescribed clonazepam, unable to clarify patient's Pharmacy, PCP and/or Specialists.      Patient prescribed oxycodone IR 30 q6h PRN; clonazepam 1 q12h; zolpidem 10 qhs daily by Dr. Nick Caballero. iSTOP reference #[id number] (placed in patient chart). *********Patient has alternate MRN due to alternate spelling of name: Pamela Sam (MRN [id number])*************    Collateral history obtained from sons, Andrea and Chevy at bedside:     Patient resides with son Chevy who was present when her symptoms began overnight. Son reports she c/o severe and sudden SOB, appeared very anxious, and working hard to breathe. He does not recall patient having any cough, fevers or respiratory distress prior to last night. Patient has sarcoidosis and uses multiple inhalers however family states she is not always compliant with medications (for example, prednisone for sarcoidosis as prescribed due to weight gain). Patient agreed for EMS to be called. While waiting for EMS, son noted she became more lethargic and drowsy, and had to keep arousing her until EMS arrived.  Patient evaluated 2 days ago in [facility] ED for syncopal episode (found outside by neighbors, LOC transient, headstrike unclear) which is not a new problem. Patient experienced up to 8 episodes in 2-week period and received a pacemaker 7-8 months ago. At baseline patient ambulates without walker/cane, capable of ADLs independently however sons suggest should be receiving supervision/additional care given possible medication non-compliance and history of syncope. Of note, patient has been a smoker for at least 30 years (currently smokes 3-4 cigarettes/day). Tentative medication list obtained from Prescription Writer. Sons are unclear as to exact medical conditions and medications patient but confirm patient is on multiple inhalers and prednisone for sarcoidosis and takes pain medications for "arthritis." They are unaware patient is prescribed clonazepam, unable to clarify patient's Pharmacy, PCP and/or Specialists.      Patient prescribed oxycodone IR 30 q6h PRN; clonazepam 1 q12h; zolpidem 10 qhs daily by Dr. Nick Caballero. iSTOP reference #[id number] (placed in patient chart).    Pallavi Tello MD-PGY3  MICU Resident  Dept. Internal Medicine *****Patient has alternate MRN due to alternate spelling of name: Pamela Sam (MRN [id number])******    Collateral history obtained from sons, Andrea and Chevy at bedside:     Patient resides with son Chevy who was present when her symptoms began overnight. Son reports she c/o severe and sudden SOB, appeared very anxious, and working hard to breathe. He does not recall patient having any cough, fevers or respiratory distress prior to last night. Patient has sarcoidosis and uses multiple inhalers however family states she is not always compliant with medications (for example, prednisone for sarcoidosis as prescribed due to weight gain). Patient agreed for EMS to be called. While waiting for EMS, son noted she became more lethargic and drowsy, and had to keep arousing her until EMS arrived.  Patient evaluated 2 days ago in [facility] ED for syncopal episode (found outside by neighbors, LOC transient, headstrike unclear) which is not a new problem. Patient experienced up to 8 episodes in 2-week period and received a pacemaker 7-8 months ago. At baseline patient ambulates without walker/cane, capable of ADLs independently however sons suggest should be receiving supervision/additional care given possible medication non-compliance and history of syncope. Of note, patient has been a smoker for at least 30 years (currently smokes 3-4 cigarettes/day). Tentative medication list obtained from Prescription Writer. Sons are unclear as to exact medical conditions and medications patient but confirm patient is on multiple inhalers and prednisone for sarcoidosis and takes pain medications for "arthritis." They are unaware patient is prescribed clonazepam, unable to clarify patient's Pharmacy, PCP and/or Specialists.      Patient prescribed oxycodone IR 30 q6h PRN; clonazepam 1 q12h; zolpidem 10 qhs daily by Dr. Nick Caballero. iSTOP reference #[id number] (placed in patient chart).    Pallavi Tello MD-PGY3  MICU Resident  Dept. Internal Medicine Patient has a different MRN due to alternate spelling of name: Pamela Sam (MRN [id number])    Collateral history obtained from sons, Andrea and Chevy at bedside:     Patient resides with son Chevy who was present when her symptoms began overnight. Son reports she c/o severe and sudden SOB, appeared very anxious, and working hard to breathe. He does not recall patient having any cough, fevers or respiratory distress prior to last night. Patient has sarcoidosis and uses multiple inhalers however family states she is not always compliant with medications (for example, prednisone for sarcoidosis as prescribed due to weight gain). Patient agreed for EMS to be called. While waiting for EMS, son noted she became more lethargic and drowsy, and had to keep arousing her until EMS arrived.  Patient evaluated 2 days ago in [facility] ED for syncopal episode (found outside by neighbors, LOC transient, headstrike unclear) which is not a new problem. Patient experienced up to 8 episodes in 2-week period and received a pacemaker 7-8 months ago. At baseline patient ambulates without walker/cane, capable of ADLs independently however sons suggest should be receiving supervision/additional care given possible medication non-compliance and history of syncope. Of note, patient has been a smoker for at least 30 years (currently smokes 3-4 cigarettes/day). Tentative medication list obtained from Prescription Writer. Sons are unclear as to exact medical conditions and medications patient but confirm patient is on multiple inhalers and prednisone for sarcoidosis and takes pain medications for "arthritis." They are unaware patient is prescribed clonazepam, unable to clarify patient's Pharmacy, PCP and/or Specialists.      Patient prescribed oxycodone IR 30 q6h PRN; clonazepam 1 q12h; zolpidem 10 qhs daily by Dr. Nick Caballero. iSTOP reference #[id number] (placed in patient chart).    Pallavi Tello MD-PGY3  MICU Resident  Dept. Internal Medicine Patient has a different MRN due to alternate spelling of name: Pamela Sam (MRN [id number])    Collateral history obtained from sons Andrea and Chevy at bedside:     Patient resides with son Chevy who was present when her symptoms began overnight. Son reports she c/o severe and sudden SOB, appeared very anxious, and working hard to breathe. He does not recall patient having any cough, fevers or respiratory distress prior to last night. Patient has sarcoidosis and uses multiple inhalers however family states she is not always compliant with medications (for example, prednisone for sarcoidosis as prescribed due to weight gain). Patient agreed for EMS to be called. While waiting for EMS, son noted she became more lethargic and drowsy, and had to keep arousing her until EMS arrived.  Patient evaluated 2 days ago in [facility] ED for syncopal episode (found outside by neighbors, LOC transient, headstrike unclear) which is not a new problem. Patient experienced up to 8 episodes in 2-week period and received a pacemaker 7-8 months ago. At baseline patient ambulates without walker/cane, capable of ADLs independently however sons suggest should be receiving supervision/additional care given possible medication non-compliance and history of syncope. Of note, patient has been a smoker for at least 30 years (smokes 3-4 cigarettes/day). Tentative medication list obtained from Prescription Writer. Sons are unclear as to exact medical conditions and medications but confirm patient on multiple inhalers and prednisone for sarcoidosis and takes pain medications for "arthritis." They are unaware patient is prescribed clonazepam, unable to clarify patient's Pharmacy, PCP and/or Specialists.      Patient prescribed oxycodone IR 30 q6h PRN; clonazepam 1 q12h; zolpidem 10 qhs daily by Dr. Nick Caballero. iSTOP reference #[id number] (placed in patient chart).    Pallavi Tello MD-PGY3  MICU Resident  Dept. Internal Medicine

## 2018-08-25 NOTE — H&P ADULT - NSHPPHYSICALEXAM_GEN_ALL_CORE
General: Intubated, sedated.   Neuro: Sedated.   HEENT: Pinpoint pupils.   Cardio: S1. S2 noted.   Respiratory: Coarse breath sounds heard in bilateral lung fields. ETT noted to have frothy pink secretions.   Abdomen: Soft, nondistended.   Extremities: Trace edema.

## 2018-08-25 NOTE — CHART NOTE - NSCHARTNOTEFT_GEN_A_CORE
Contacted by RN for Levophed running through 20g PIV in [facility] with signs of extravasation. Extremity found to be edematous, with 5 cmx5 cm area of induration. Adhered to pressor infiltration  protocol- discontinued use of IV, 5mg  phentolamine  reconstituted with 10cc normal saline and injected through IV site and in clockwise fashion around indurated area. Peripheral IV then removed.   Extremity closely examined- circumferential area at infiltration site with good blanchability, areas of ecchymosis noted,. Proximal pulse 2+ @ axillary and distally 2+  @ brachial and radial sites intact with patent flow on bedside US. Cap refill present distally , skinat site  intact without blistering or breakdown. Affected area marked and will be monitored.    New PIV access obtained under US guidance on LUE.

## 2018-08-25 NOTE — ED PROVIDER NOTE - ATTENDING CONTRIBUTION TO CARE
History and physical above obtained and documented (or dictated) by me (attending physician).  Resident involved in case for assistance with disposition and may document involvement as necessary via progress note(s).   -Dr. Ibanez

## 2018-08-25 NOTE — PATIENT PROFILE ADULT. - VISION (WITH CORRECTIVE LENSES IF THE PATIENT USUALLY WEARS THEM):
unable to assess intubated sedated w/o family @bedside Normal vision: sees adequately in most situations; can see medication labels, newsprint

## 2018-08-25 NOTE — H&P ADULT - PMH
Pacemaker  PPM  Sarcoidosis Pacemaker  PPM  Sarcoidosis    Transient cerebral ischemia, unspecified type

## 2018-08-25 NOTE — PATIENT PROFILE ADULT. - DOES PATIENT HAVE ADVANCE DIRECTIVE
unable to assess intubated sedated w/o family @bedside No/unable to assess intubated sedated w/o family @bedside

## 2018-08-25 NOTE — H&P ADULT - ASSESSMENT
The patient is a 60 year old female with a past medical history of sarcoidosis, PPM, and TIA who presents with SOB and AMS, urgently intubated due to hypoxic hypercapnic respiratory failure and admitted to the MICU for management.     #Neuro  - Intubated, sedated  - CT Head negative for acute bleed    #Resp  - Hypoxic hypercapnic respiratory failure. Initial pH 6.97, PCO2>110, saturating 80% on NRB.   - Intubated, post intubated VBG with improving hypercapnia  - CXR with pulmonary edema   - Sepsis vs Acute HF exacerbation . Need to obtain accurate medical history   - Sarcoidosis? Need to know if pt is on chronic steroids     #Cardio   - On levophed due to hypotension 80s/40s post intubated (likely sedated induced, as pt initially was hypertensive upon presentation)   - EKG with LBBB  - Pending cardiac enzymes, BNP, cortisol      #ID  Lactic acidosis, metabolic acidosis, mild leukocytosis    Afebrile  - Cultures pending  - Will start on antibiotics     #Metabolic    #GI    #Prophylaxis     Neda Ortiz MD, PGY I  Department of Internal Medicine The patient is a 60 year old female with a past medical history of sarcoidosis, PPM, and TIA who presents with SOB and AMS, urgently intubated due to hypoxic hypercapnic respiratory failure and admitted to the MICU for management.     #Neuro  - Intubated, sedated  - CT Head negative for acute bleed  - Per ED note, family reported pt was A00x3 at home. Will attempt to get in touch with family and obtain accurate medical history      #Resp  - Hypoxic hypercapnic respiratory failure. Initial pH 6.97, PCO2>110, saturating 80% on NRB.   - Intubated, post intubated VBG with improving hypercapnia  - CXR with pulmonary edema   - Sepsis vs Acute HF exacerbation . Need to obtain accurate medical history   - Sarcoidosis? Need to know if pt is on chronic steroids     #Cardio   - On levophed due to hypotension 80s/40s post intubated (likely sedated induced, as pt initially was hypertensive upon presentation)   - EKG with LBBB  - Pending cardiac enzymes, BNP, cortisol      #ID  Lactic acidosis, metabolic acidosis, mild leukocytosis    Afebrile  - Cultures pending  - Will start on antibiotics     #Metabolic    #GI    #Prophylaxis     Neda Ortiz MD, PGY I  Department of Internal Medicine The patient is a 60 year old female with a past medical history of sarcoidosis, PPM, and TIA who presents with SOB and AMS, urgently intubated due to hypoxic hypercapnic respiratory failure and admitted to the MICU for management.     #Neuro  - Intubated, sedated  - CT Head negative for acute bleed  - Per ED note, family reported pt was A00x3 at home. Will attempt to get in touch with family and obtain accurate medical history      #Resp  - Hypoxic hypercapnic respiratory failure. Initial pH 6.97, PCO2>110, saturating 80% on NRB.   - Intubated, post intubated VBG with improving hypercapnia  - CXR with pulmonary edema   - Sepsis vs Acute HF exacerbation . Need to obtain accurate medical history   - Sarcoidosis? Need to know if pt is on chronic steroids     #Cardio   - On levophed due to hypotension 80s/40s post intubated (likely sedated induced, as pt initially was hypertensive upon presentation)   - EKG with LBBB  - Pending cardiac enzymes, BNP, cortisol      #ID  Lactic acidosis, metabolic acidosis, mild leukocytosis    Afebrile  - Cultures pending  - Will start on antibiotics     #Metabolic      #GI      #Prophylaxis       Neda Ortiz MD, PGY I  Department of Internal Medicine The patient is a 60 year old female with a past medical history of sarcoidosis, PPM, and TIA who presents with SOB and AMS, urgently intubated due to hypoxic hypercapnic respiratory failure and admitted to the MICU for management.     #Neuro  - Intubated, sedated  - CT Head negative for acute bleed  - Per ED note, family reported pt was A00x3 at home. Will attempt to get in touch with family and obtain accurate medical history      #Resp  - Hypoxic hypercapnic respiratory failure. Initial pH 6.97, PCO2>110, saturating 80% on NRB.   - Intubated, post intubated VBG with improving hypercapnia  - CXR with pulmonary edema   - Sepsis vs Acute HF exacerbation . Need to obtain accurate medical history   - Sarcoidosis? Need to know if pt is on chronic steroids     #Cardio   - On levophed due to hypotension 80s/40s post intubated (likely sedation induced, as pt initially was hypertensive upon presentation)   - EKG with LBBB. Troponin negative.   - .   - Pending cortisol      #ID  Lactic acidosis, metabolic acidosis, mild leukocytosis    Afebrile  - Cultures pending  - Will start on antibiotics     #Metabolic  - Monitor Cr, urine output     #Prophylaxis   - Venodynes for DVT prophylaxis       Neda Ortiz MD, PGY I  Department of Internal Medicine

## 2018-08-25 NOTE — ED ADULT TRIAGE NOTE - CHIEF COMPLAINT QUOTE
Brought in by [facility] as unnotified Notification for respiratory distress, on non-rebreather, agonally breathing and decreased responsiveness per EMS, satting 80%.  Hx Lung sarcoidosis, PPM, HTN. Brought in by [facility] as unnotified Notification for respiratory distress, on non-rebreather, agonally breathing and decreased responsiveness per EMS, satting 80%.  Hx Lung sarcoidosis, PPM, HTN.  Brought Direct to [facility]

## 2018-08-25 NOTE — CHART NOTE - NSCHARTNOTEFT_GEN_A_CORE
Indication: Dense bilateral consolidation, respiratory failure.     Operators: Andrew Fuentes    Preop medications: 4 mg of versed. 100 mcg of fentanyl.    Xray/CT Findings:    Findings:  Bronchoscope inserted through ETT. ETT noted to be in good position. Airway evaluation revealed extensive blood tinged secretions with extremely erythematous and inflamed airways. Purulent secretions noted. A BAL was performed in the basal segement of the RLL with a total of 120cc of normal saline in 30cc aliquots.   Bronchoscope then withdrawn from ETT. Patient tolerated procedure well.     Specimens: Sent to lab for culture, cell count and cytopath.

## 2018-08-25 NOTE — ED PROVIDER NOTE - CRITICAL CARE PROVIDED
consultation with other physicians/additional history taking/interpretation of diagnostic studies/documentation/direct patient care (not related to procedure)

## 2018-08-25 NOTE — ED ADULT NURSE NOTE - NSIMPLEMENTINTERV_GEN_ALL_ED
Implemented All Fall with Harm Risk Interventions:  [place] to call system. Call bell, personal items and telephone within reach. Instruct patient to call for assistance. Room bathroom lighting operational. Non-slip footwear when patient is off stretcher. Physically safe environment: no spills, clutter or unnecessary equipment. Stretcher in lowest position, wheels locked, appropriate side rails in place. Provide visual cue, wrist band, yellow gown, etc. Monitor gait and stability. Monitor for mental status changes and reorient to person, place, and time. Review medications for side effects contributing to fall risk. Reinforce activity limits and safety measures with patient and family. Provide visual clues: red socks.

## 2018-08-25 NOTE — ED ADULT NURSE NOTE - OBJECTIVE STATEMENT
pt recd as unnotified notification, EMS reports recd ac all for Shortness of Breath upon arrival, pt is in respiratoty distress, gasping for air, tachypneic labor breathing, 80% on NRB hypotensive at 89/50. Pt res[pond to pain stimuli, non verbal, moaning MD at bedside eval the pt. is  Intubated   Rate 14 FIO2 100% Peep 5with ETTube 7.5,  lip line 23cm.,  on cm paced rhythm, Coffey inserted/ will monitor pt recd as unnotified notification, EMS reports recd ac all for Shortness of Breath upon arrival, pt is in respiratoty distress, gasping for air, tachypneic labor breathing, 80% on NRB hypotensive at 89/50. Pt res[pond to pain stimuli, non verbal, moaning MD at bedside eval the pt. is  Intubated   Rate 14 FIO2 100% Peep 5with ETTube 7.5,  lip line 23cm.,  on cm paced rhythm, Coffey inserted/ will monitor, Pacemaker noted upon palpation on the chest, Big healed scar on the Left parietal area,

## 2018-08-25 NOTE — ED PROVIDER NOTE - PHYSICAL EXAMINATION
Gen: Awake, non-responsive  Head: NC, AT, normal lids/conjunctiva  ENT:  normal hearing, patent oropharynx without erythema/exudate  Neck: +supple, no tenderness/meningismus/JVD, +Trachea midline  Chest: no chest wall tenderness, equal chest rise  Pulm: Bilateral BS, normal resp effort, no wheeze/stridor/retractions  CV: RRR, no M/R/G, +dist pulses  Abd: +BS, soft, NT/ND  Rectal: deferred  Mskel: no edema/erythema/cyanosis  Skin: no rash  Neuro: Awake, not alert, unresponsive, not withdrawing to pain

## 2018-08-26 LAB
ALBUMIN SERPL ELPH-MCNC: 3.1 G/DL — LOW (ref 3.3–5)
ALP SERPL-CCNC: 54 U/L — SIGNIFICANT CHANGE UP (ref 40–120)
ALT FLD-CCNC: 18 U/L — SIGNIFICANT CHANGE UP (ref 4–33)
AST SERPL-CCNC: 26 U/L — SIGNIFICANT CHANGE UP (ref 4–32)
BACTERIA UR CULT: SIGNIFICANT CHANGE UP
BASE EXCESS BLDA CALC-SCNC: -0.3 MMOL/L — SIGNIFICANT CHANGE UP
BASE EXCESS BLDA CALC-SCNC: 3.7 MMOL/L — SIGNIFICANT CHANGE UP
BASOPHILS # BLD AUTO: 0.01 K/UL — SIGNIFICANT CHANGE UP (ref 0–0.2)
BASOPHILS NFR BLD AUTO: 0.1 % — SIGNIFICANT CHANGE UP (ref 0–2)
BILIRUB SERPL-MCNC: 0.4 MG/DL — SIGNIFICANT CHANGE UP (ref 0.2–1.2)
BUN SERPL-MCNC: 13 MG/DL — SIGNIFICANT CHANGE UP (ref 7–23)
CALCIUM SERPL-MCNC: 8.5 MG/DL — SIGNIFICANT CHANGE UP (ref 8.4–10.5)
CHLORIDE BLDA-SCNC: 107 MMOL/L — SIGNIFICANT CHANGE UP (ref 96–108)
CHLORIDE BLDA-SCNC: 113 MMOL/L — HIGH (ref 96–108)
CHLORIDE SERPL-SCNC: 108 MMOL/L — HIGH (ref 98–107)
CO2 SERPL-SCNC: 23 MMOL/L — SIGNIFICANT CHANGE UP (ref 22–31)
CREAT SERPL-MCNC: 0.94 MG/DL — SIGNIFICANT CHANGE UP (ref 0.5–1.3)
EOSINOPHIL # BLD AUTO: 0 K/UL — SIGNIFICANT CHANGE UP (ref 0–0.5)
EOSINOPHIL NFR BLD AUTO: 0 % — SIGNIFICANT CHANGE UP (ref 0–6)
GLUCOSE BLDA-MCNC: 148 MG/DL — HIGH (ref 70–99)
GLUCOSE BLDA-MCNC: 192 MG/DL — HIGH (ref 70–99)
GLUCOSE SERPL-MCNC: 144 MG/DL — HIGH (ref 70–99)
HCO3 BLDA-SCNC: 24 MMOL/L — SIGNIFICANT CHANGE UP (ref 22–26)
HCO3 BLDA-SCNC: 28 MMOL/L — HIGH (ref 22–26)
HCT VFR BLD CALC: 33 % — LOW (ref 34.5–45)
HCT VFR BLDA CALC: 31.2 % — LOW (ref 34.5–46.5)
HCT VFR BLDA CALC: 33.4 % — LOW (ref 34.5–46.5)
HGB BLD-MCNC: 10.5 G/DL — LOW (ref 11.5–15.5)
HGB BLDA-MCNC: 10.1 G/DL — LOW (ref 11.5–15.5)
HGB BLDA-MCNC: 10.8 G/DL — LOW (ref 11.5–15.5)
IMM GRANULOCYTES # BLD AUTO: 0.05 # — SIGNIFICANT CHANGE UP
IMM GRANULOCYTES NFR BLD AUTO: 0.6 % — SIGNIFICANT CHANGE UP (ref 0–1.5)
L PNEUMO AG UR QL: NEGATIVE — SIGNIFICANT CHANGE UP
LACTATE BLDA-SCNC: 2 MMOL/L — SIGNIFICANT CHANGE UP (ref 0.5–2)
LACTATE BLDA-SCNC: 2.3 MMOL/L — HIGH (ref 0.5–2)
LYMPHOCYTES # BLD AUTO: 1.19 K/UL — SIGNIFICANT CHANGE UP (ref 1–3.3)
LYMPHOCYTES # BLD AUTO: 13.1 % — SIGNIFICANT CHANGE UP (ref 13–44)
MAGNESIUM SERPL-MCNC: 2.3 MG/DL — SIGNIFICANT CHANGE UP (ref 1.6–2.6)
MCHC RBC-ENTMCNC: 29.4 PG — SIGNIFICANT CHANGE UP (ref 27–34)
MCHC RBC-ENTMCNC: 31.8 % — LOW (ref 32–36)
MCV RBC AUTO: 92.4 FL — SIGNIFICANT CHANGE UP (ref 80–100)
MONOCYTES # BLD AUTO: 0.27 K/UL — SIGNIFICANT CHANGE UP (ref 0–0.9)
MONOCYTES NFR BLD AUTO: 3 % — SIGNIFICANT CHANGE UP (ref 2–14)
NEUTROPHILS # BLD AUTO: 7.53 K/UL — HIGH (ref 1.8–7.4)
NEUTROPHILS NFR BLD AUTO: 83.2 % — HIGH (ref 43–77)
NRBC # FLD: 0 — SIGNIFICANT CHANGE UP
PCO2 BLDA: 41 MMHG — SIGNIFICANT CHANGE UP (ref 32–48)
PCO2 BLDA: 50 MMHG — HIGH (ref 32–48)
PH BLDA: 7.32 PH — LOW (ref 7.35–7.45)
PH BLDA: 7.44 PH — SIGNIFICANT CHANGE UP (ref 7.35–7.45)
PHOSPHATE SERPL-MCNC: 2.4 MG/DL — LOW (ref 2.5–4.5)
PLATELET # BLD AUTO: 281 K/UL — SIGNIFICANT CHANGE UP (ref 150–400)
PMV BLD: 9.8 FL — SIGNIFICANT CHANGE UP (ref 7–13)
PO2 BLDA: 79 MMHG — LOW (ref 83–108)
PO2 BLDA: 83 MMHG — SIGNIFICANT CHANGE UP (ref 83–108)
POTASSIUM BLDA-SCNC: 2.9 MMOL/L — LOW (ref 3.4–4.5)
POTASSIUM BLDA-SCNC: 4 MMOL/L — SIGNIFICANT CHANGE UP (ref 3.4–4.5)
POTASSIUM SERPL-MCNC: 4.3 MMOL/L — SIGNIFICANT CHANGE UP (ref 3.5–5.3)
POTASSIUM SERPL-SCNC: 4.3 MMOL/L — SIGNIFICANT CHANGE UP (ref 3.5–5.3)
PROT SERPL-MCNC: 6 G/DL — SIGNIFICANT CHANGE UP (ref 6–8.3)
RBC # BLD: 3.57 M/UL — LOW (ref 3.8–5.2)
RBC # FLD: 14.2 % — SIGNIFICANT CHANGE UP (ref 10.3–14.5)
SAO2 % BLDA: 96.3 % — SIGNIFICANT CHANGE UP (ref 95–99)
SAO2 % BLDA: 96.7 % — SIGNIFICANT CHANGE UP (ref 95–99)
SODIUM BLDA-SCNC: 139 MMOL/L — SIGNIFICANT CHANGE UP (ref 136–146)
SODIUM BLDA-SCNC: 141 MMOL/L — SIGNIFICANT CHANGE UP (ref 136–146)
SODIUM SERPL-SCNC: 142 MMOL/L — SIGNIFICANT CHANGE UP (ref 135–145)
SPECIMEN SOURCE: SIGNIFICANT CHANGE UP
WBC # BLD: 9.05 K/UL — SIGNIFICANT CHANGE UP (ref 3.8–10.5)
WBC # FLD AUTO: 9.05 K/UL — SIGNIFICANT CHANGE UP (ref 3.8–10.5)

## 2018-08-26 PROCEDURE — 99291 CRITICAL CARE FIRST HOUR: CPT

## 2018-08-26 PROCEDURE — 71045 X-RAY EXAM CHEST 1 VIEW: CPT | Mod: 26

## 2018-08-26 PROCEDURE — 93306 TTE W/DOPPLER COMPLETE: CPT | Mod: 26

## 2018-08-26 RX ORDER — POTASSIUM PHOSPHATE, MONOBASIC POTASSIUM PHOSPHATE, DIBASIC 236; 224 MG/ML; MG/ML
15 INJECTION, SOLUTION INTRAVENOUS ONCE
Qty: 0 | Refills: 0 | Status: COMPLETED | OUTPATIENT
Start: 2018-08-26 | End: 2018-08-26

## 2018-08-26 RX ORDER — CHLORHEXIDINE GLUCONATE 213 G/1000ML
1 SOLUTION TOPICAL
Qty: 0 | Refills: 0 | Status: DISCONTINUED | OUTPATIENT
Start: 2018-08-26 | End: 2018-09-02

## 2018-08-26 RX ORDER — FUROSEMIDE 40 MG
40 TABLET ORAL ONCE
Qty: 0 | Refills: 0 | Status: COMPLETED | OUTPATIENT
Start: 2018-08-26 | End: 2018-08-26

## 2018-08-26 RX ORDER — HYDROCORTISONE 20 MG
100 TABLET ORAL EVERY 12 HOURS
Qty: 0 | Refills: 0 | Status: DISCONTINUED | OUTPATIENT
Start: 2018-08-26 | End: 2018-09-01

## 2018-08-26 RX ORDER — POTASSIUM CHLORIDE 20 MEQ
10 PACKET (EA) ORAL
Qty: 0 | Refills: 0 | Status: COMPLETED | OUTPATIENT
Start: 2018-08-26 | End: 2018-08-26

## 2018-08-26 RX ADMIN — Medication 250 MILLIGRAM(S): at 11:39

## 2018-08-26 RX ADMIN — POTASSIUM PHOSPHATE, MONOBASIC POTASSIUM PHOSPHATE, DIBASIC 62.5 MILLIMOLE(S): 236; 224 INJECTION, SOLUTION INTRAVENOUS at 06:59

## 2018-08-26 RX ADMIN — ALBUTEROL 2 PUFF(S): 90 AEROSOL, METERED ORAL at 10:34

## 2018-08-26 RX ADMIN — AZITHROMYCIN 250 MILLIGRAM(S): 500 TABLET, FILM COATED ORAL at 11:39

## 2018-08-26 RX ADMIN — ALBUTEROL 2 PUFF(S): 90 AEROSOL, METERED ORAL at 15:32

## 2018-08-26 RX ADMIN — Medication 40 MILLIGRAM(S): at 06:59

## 2018-08-26 RX ADMIN — Medication 9.19 MICROGRAM(S)/KG/MIN: at 11:40

## 2018-08-26 RX ADMIN — ALBUTEROL 2 PUFF(S): 90 AEROSOL, METERED ORAL at 04:17

## 2018-08-26 RX ADMIN — PROPOFOL 4.2 MICROGRAM(S)/KG/MIN: 10 INJECTION, EMULSION INTRAVENOUS at 20:46

## 2018-08-26 RX ADMIN — FENTANYL CITRATE 3.5 MICROGRAM(S)/KG/HR: 50 INJECTION INTRAVENOUS at 02:34

## 2018-08-26 RX ADMIN — Medication 100 MILLIGRAM(S): at 17:42

## 2018-08-26 RX ADMIN — Medication 100 MILLIEQUIVALENT(S): at 17:41

## 2018-08-26 RX ADMIN — PROPOFOL 4.2 MICROGRAM(S)/KG/MIN: 10 INJECTION, EMULSION INTRAVENOUS at 06:59

## 2018-08-26 RX ADMIN — Medication 100 MILLIGRAM(S): at 05:54

## 2018-08-26 RX ADMIN — Medication 250 MILLIGRAM(S): at 23:53

## 2018-08-26 RX ADMIN — ENOXAPARIN SODIUM 40 MILLIGRAM(S): 100 INJECTION SUBCUTANEOUS at 10:10

## 2018-08-26 RX ADMIN — PROPOFOL 4.2 MICROGRAM(S)/KG/MIN: 10 INJECTION, EMULSION INTRAVENOUS at 02:34

## 2018-08-26 RX ADMIN — Medication 1 PUFF(S): at 15:32

## 2018-08-26 RX ADMIN — Medication 100 MILLIEQUIVALENT(S): at 15:36

## 2018-08-26 RX ADMIN — Medication 1 PUFF(S): at 22:30

## 2018-08-26 RX ADMIN — PIPERACILLIN AND TAZOBACTAM 25 GRAM(S): 4; .5 INJECTION, POWDER, LYOPHILIZED, FOR SOLUTION INTRAVENOUS at 02:34

## 2018-08-26 RX ADMIN — ALBUTEROL 2 PUFF(S): 90 AEROSOL, METERED ORAL at 22:30

## 2018-08-26 RX ADMIN — PIPERACILLIN AND TAZOBACTAM 25 GRAM(S): 4; .5 INJECTION, POWDER, LYOPHILIZED, FOR SOLUTION INTRAVENOUS at 10:10

## 2018-08-26 RX ADMIN — Medication 1 PUFF(S): at 04:16

## 2018-08-26 RX ADMIN — PIPERACILLIN AND TAZOBACTAM 25 GRAM(S): 4; .5 INJECTION, POWDER, LYOPHILIZED, FOR SOLUTION INTRAVENOUS at 17:42

## 2018-08-26 RX ADMIN — PROPOFOL 4.2 MICROGRAM(S)/KG/MIN: 10 INJECTION, EMULSION INTRAVENOUS at 11:40

## 2018-08-26 RX ADMIN — Medication 1 PUFF(S): at 10:35

## 2018-08-26 NOTE — PROGRESS NOTE ADULT - SUBJECTIVE AND OBJECTIVE BOX
MICU Progress Note    INTERVAL HPI/OVERNIGHT EVENTS: Overnight pt had OG tube placed, no    SUBJECTIVE: Patient seen and examined at bedside.     OBJECTIVE:    VITAL SIGNS:  ICU Vital Signs Last 24 Hrs  T(C): 37.3 (26 Aug 2018 04:00), Max: 37.8 (25 Aug 2018 16:00)  T(F): 99.2 (26 Aug 2018 04:00), Max: 100 (25 Aug 2018 16:00)  HR: 70 (26 Aug 2018 07:00) (62 - 87)  BP: 115/66 (26 Aug 2018 07:00) (53/29 - 200/98)  BP(mean): 78 (26 Aug 2018 07:00) (35 - 150)  ABP: --  ABP(mean): --  RR: 20 (26 Aug 2018 07:00) (14 - 21)  SpO2: 100% (26 Aug 2018 07:00) (91% - 100%)    Mode: AC/ CMV (Assist Control/ Continuous Mandatory Ventilation), RR (machine): 20, TV (machine): 380, FiO2: 40, PEEP: 8, ITime: 1, MAP: 12, PIP: 24    08-25 @ 07:01  -  - @ 07:00  --------------------------------------------------------  IN: 1800.2 mL / OUT: 3690 mL / NET: -1889.8 mL      CAPILLARY BLOOD GLUCOSE      POCT Blood Glucose.: 137 mg/dL (26 Aug 2018 05:38)      PHYSICAL EXAM:    General: NAD  HEENT: NC/AT; PERRL, clear conjunctiva  Neck: supple  Respiratory: CTA b/l  Cardiovascular: +S1/S2; RRR  Abdomen: soft, NT/ND; +BS x4  Extremities: WWP, 2+ peripheral pulses b/l; no LE edema  Skin: normal color and turgor; no rash  Neurological:    MEDICATIONS:  MEDICATIONS  (STANDING):  ALBUTerol    90 MICROgram(s) HFA Inhaler 2 Puff(s) Inhalation every 6 hours  azithromycin  IVPB 500 milliGRAM(s) IV Intermittent daily  enoxaparin Injectable 40 milliGRAM(s) SubCutaneous every 24 hours  fentaNYL   Infusion. 0.5 MICROgram(s)/kG/Hr (3.5 mL/Hr) IV Continuous <Continuous>  hydrocortisone sodium succinate Injectable 100 milliGRAM(s) IV Push every 8 hours  ipratropium 17 MICROgram(s) HFA Inhaler 1 Puff(s) Inhalation every 6 hours  norepinephrine Infusion 0.07 MICROgram(s)/kG/Min (9.188 mL/Hr) IV Continuous <Continuous>  piperacillin/tazobactam IVPB. 3.375 Gram(s) IV Intermittent every 8 hours  propofol Infusion 10 MICROgram(s)/kG/Min (4.2 mL/Hr) IV Continuous <Continuous>  vancomycin  IVPB      vancomycin  IVPB 1000 milliGRAM(s) IV Intermittent every 12 hours    MEDICATIONS  (PRN):      ALLERGIES:  Allergies    No Known Allergies    Intolerances        LABS:                        10.5   9.05  )-----------( 281      ( 26 Aug 2018 05:00 )             33.0     CBC Full  -  ( 26 Aug 2018 05:00 )  WBC Count : 9.05 K/uL  Hemoglobin : 10.5 g/dL  Hematocrit : 33.0 %  Platelet Count - Automated : 281 K/uL  Mean Cell Volume : 92.4 fL  Mean Cell Hemoglobin : 29.4 pg  Mean Cell Hemoglobin Concentration : 31.8 %  Auto Neutrophil # : 7.53 K/uL  Auto Lymphocyte # : 1.19 K/uL  Auto Monocyte # : 0.27 K/uL  Auto Eosinophil # : 0.00 K/uL  Auto Basophil # : 0.01 K/uL  Auto Neutrophil % : 83.2 %  Auto Lymphocyte % : 13.1 %  Auto Monocyte % : 3.0 %  Auto Eosinophil % : 0.0 %  Auto Basophil % : 0.1 %        142  |  108<H>  |  13  ----------------------------<  144<H>  4.3   |  23  |  0.94    Ca    8.5      26 Aug 2018 05:00  Phos  2.4     08-  Mg     2.3         TPro  6.0  /  Alb  3.1<L>  /  TBili  0.4  /  DBili  x   /  AST  26  /  ALT  18  /  AlkPhos  54      Creatinine Trend: 0.94<--, 1.09<--  LIVER FUNCTIONS - ( 26 Aug 2018 05:00 )  Alb: 3.1 g/dL / Pro: 6.0 g/dL / ALK PHOS: 54 u/L / ALT: 18 u/L / AST: 26 u/L / GGT: x           PT/INR - ( 25 Aug 2018 03:17 )   PT: 11.3 SEC;   INR: 1.02          PTT - ( 25 Aug 2018 03:17 )  PTT:31.6 SEC      ABG - ( 26 Aug 2018 05:00 )  pH, Arterial: 7.32  pH, Blood: x     /  pCO2: 50    /  pO2: 83    / HCO3: 24    / Base Excess: -0.3  /  SaO2: 96.3              Urinalysis Basic - ( 25 Aug 2018 05:00 )    Color: YELLOW / Appearance: CLEAR / S.030 / pH: 5.5  Gluc: 500 / Ketone: NEGATIVE  / Bili: NEGATIVE / Urobili: NORMAL   Blood: MODERATE / Protein: 30 / Nitrite: NEGATIVE   Leuk Esterase: TRACE / RBC: 25-50 / WBC 5-10   Sq Epi: FEW / Non Sq Epi: x / Bacteria: x        MICROBIOLOGY:    Culture - Respiratory with Gram Stain (collected 25 Aug 2018 18:30)  Source: BRONCHIAL LAVAGE    Culture - Respiratory with Gram Stain (collected 25 Aug 2018 10:56)  Source: ENDOTRACHEAL SPECIMEN      IMAGING:    RADIOLOGY & ADDITIONAL TESTS: Reviewed. MICU Progress Note    INTERVAL HPI/OVERNIGHT EVENTS: Overnight pt had OG tube placed, no    SUBJECTIVE: Patient seen and examined at bedside.     OBJECTIVE: No overnight events.     VITAL SIGNS:  ICU Vital Signs Last 24 Hrs  T(C): 37.3 (26 Aug 2018 04:00), Max: 37.8 (25 Aug 2018 16:00)  T(F): 99.2 (26 Aug 2018 04:00), Max: 100 (25 Aug 2018 16:00)  HR: 70 (26 Aug 2018 07:00) (62 - 87)  BP: 115/66 (26 Aug 2018 07:00) (53/29 - 200/98)  BP(mean): 78 (26 Aug 2018 07:00) (35 - 150)  ABP: --  ABP(mean): --  RR: 20 (26 Aug 2018 07:00) (14 - 21)  SpO2: 100% (26 Aug 2018 07:) (91% - 100%)    Mode: AC/ CMV (Assist Control/ Continuous Mandatory Ventilation), RR (machine): 20, TV (machine): 380, FiO2: 40, PEEP: 8, ITime: 1, MAP: 12, PIP: 24    08-25 @ 07:01  -  - @ 07:00  --------------------------------------------------------  IN: 1800.2 mL / OUT: 3690 mL / NET: -1889.8 mL      CAPILLARY BLOOD GLUCOSE      POCT Blood Glucose.: 137 mg/dL (26 Aug 2018 05:38)      PHYSICAL EXAM:    General: NAD  HEENT: NC/AT; PERRL, clear conjunctiva  Neck: supple  Respiratory: CTA b/l  Cardiovascular: +S1/S2; RRR  Abdomen: soft, NT/ND; +BS x4  Extremities: WWP, 2+ peripheral pulses b/l; no LE edema  Skin: normal color and turgor; no rash  Neurological:    MEDICATIONS:  MEDICATIONS  (STANDING):  ALBUTerol    90 MICROgram(s) HFA Inhaler 2 Puff(s) Inhalation every 6 hours  azithromycin  IVPB 500 milliGRAM(s) IV Intermittent daily  enoxaparin Injectable 40 milliGRAM(s) SubCutaneous every 24 hours  fentaNYL   Infusion. 0.5 MICROgram(s)/kG/Hr (3.5 mL/Hr) IV Continuous <Continuous>  hydrocortisone sodium succinate Injectable 100 milliGRAM(s) IV Push every 8 hours  ipratropium 17 MICROgram(s) HFA Inhaler 1 Puff(s) Inhalation every 6 hours  norepinephrine Infusion 0.07 MICROgram(s)/kG/Min (9.188 mL/Hr) IV Continuous <Continuous>  piperacillin/tazobactam IVPB. 3.375 Gram(s) IV Intermittent every 8 hours  propofol Infusion 10 MICROgram(s)/kG/Min (4.2 mL/Hr) IV Continuous <Continuous>  vancomycin  IVPB      vancomycin  IVPB 1000 milliGRAM(s) IV Intermittent every 12 hours    MEDICATIONS  (PRN):      ALLERGIES:  Allergies    No Known Allergies    Intolerances        LABS:                        10.5   9.05  )-----------( 281      ( 26 Aug 2018 05:00 )             33.0     CBC Full  -  ( 26 Aug 2018 05:00 )  WBC Count : 9.05 K/uL  Hemoglobin : 10.5 g/dL  Hematocrit : 33.0 %  Platelet Count - Automated : 281 K/uL  Mean Cell Volume : 92.4 fL  Mean Cell Hemoglobin : 29.4 pg  Mean Cell Hemoglobin Concentration : 31.8 %  Auto Neutrophil # : 7.53 K/uL  Auto Lymphocyte # : 1.19 K/uL  Auto Monocyte # : 0.27 K/uL  Auto Eosinophil # : 0.00 K/uL  Auto Basophil # : 0.01 K/uL  Auto Neutrophil % : 83.2 %  Auto Lymphocyte % : 13.1 %  Auto Monocyte % : 3.0 %  Auto Eosinophil % : 0.0 %  Auto Basophil % : 0.1 %        142  |  108<H>  |  13  ----------------------------<  144<H>  4.3   |  23  |  0.94    Ca    8.5      26 Aug 2018 05:00  Phos  2.4     08-  Mg     2.3         TPro  6.0  /  Alb  3.1<L>  /  TBili  0.4  /  DBili  x   /  AST  26  /  ALT  18  /  AlkPhos  54      Creatinine Trend: 0.94<--, 1.09<--  LIVER FUNCTIONS - ( 26 Aug 2018 05:00 )  Alb: 3.1 g/dL / Pro: 6.0 g/dL / ALK PHOS: 54 u/L / ALT: 18 u/L / AST: 26 u/L / GGT: x           PT/INR - ( 25 Aug 2018 03:17 )   PT: 11.3 SEC;   INR: 1.02          PTT - ( 25 Aug 2018 03:17 )  PTT:31.6 SEC      ABG - ( 26 Aug 2018 05:00 )  pH, Arterial: 7.32  pH, Blood: x     /  pCO2: 50    /  pO2: 83    / HCO3: 24    / Base Excess: -0.3  /  SaO2: 96.3              Urinalysis Basic - ( 25 Aug 2018 05:00 )    Color: YELLOW / Appearance: CLEAR / S.030 / pH: 5.5  Gluc: 500 / Ketone: NEGATIVE  / Bili: NEGATIVE / Urobili: NORMAL   Blood: MODERATE / Protein: 30 / Nitrite: NEGATIVE   Leuk Esterase: TRACE / RBC: 25-50 / WBC 5-10   Sq Epi: FEW / Non Sq Epi: x / Bacteria: x        MICROBIOLOGY:    Culture - Respiratory with Gram Stain (collected 25 Aug 2018 18:30)  Source: BRONCHIAL LAVAGE    Culture - Respiratory with Gram Stain (collected 25 Aug 2018 10:56)  Source: ENDOTRACHEAL SPECIMEN      IMAGING:    RADIOLOGY & ADDITIONAL TESTS: Reviewed. MICU Progress Note    INTERVAL HPI/OVERNIGHT EVENTS: Overnight pt had OG tube placed, no    SUBJECTIVE: Patient seen and examined at bedside.     OBJECTIVE: No overnight events. Unable to obtain ROS 2/2 intubation, sedation    VITAL SIGNS:  ICU Vital Signs Last 24 Hrs  T(C): 37.3 (26 Aug 2018 04:00), Max: 37.8 (25 Aug 2018 16:00)  T(F): 99.2 (26 Aug 2018 04:00), Max: 100 (25 Aug 2018 16:00)  HR: 70 (26 Aug 2018 07:00) (62 - 87)  BP: 115/66 (26 Aug 2018 07:00) (53/29 - 200/98)  BP(mean): 78 (26 Aug 2018 07:00) (35 - 150)  ABP: --  ABP(mean): --  RR: 20 (26 Aug 2018 07:00) (14 - 21)  SpO2: 100% (26 Aug 2018 07:00) (91% - 100%)    Mode: AC/ CMV (Assist Control/ Continuous Mandatory Ventilation), RR (machine): 20, TV (machine): 380, FiO2: 40, PEEP: 8, ITime: 1, MAP: 12, PIP: 24    08-25 @ 07:01  -  08-26 @ 07:00  --------------------------------------------------------  IN: 1800.2 mL / OUT: 3690 mL / NET: -1889.8 mL      CAPILLARY BLOOD GLUCOSE      POCT Blood Glucose.: 137 mg/dL (26 Aug 2018 05:38)      PHYSICAL EXAM:    General: NAD  HEENT: NC/AT; PERRL, clear conjunctiva  Neck: supple  Respiratory: Coarse breath sounds in anterior lung fields bilaterally  Cardiovascular: +S1/S2; RRR  Abdomen: soft, NT/ND; +BS x4  Extremities: WWP, 2+ peripheral pulses b/l; no LE edema  Skin: normal color and turgor; no rash  Neurological: intubated, sedated    MEDICATIONS:  MEDICATIONS  (STANDING):  ALBUTerol    90 MICROgram(s) HFA Inhaler 2 Puff(s) Inhalation every 6 hours  azithromycin  IVPB 500 milliGRAM(s) IV Intermittent daily  enoxaparin Injectable 40 milliGRAM(s) SubCutaneous every 24 hours  fentaNYL   Infusion. 0.5 MICROgram(s)/kG/Hr (3.5 mL/Hr) IV Continuous <Continuous>  hydrocortisone sodium succinate Injectable 100 milliGRAM(s) IV Push every 8 hours  ipratropium 17 MICROgram(s) HFA Inhaler 1 Puff(s) Inhalation every 6 hours  norepinephrine Infusion 0.07 MICROgram(s)/kG/Min (9.188 mL/Hr) IV Continuous <Continuous>  piperacillin/tazobactam IVPB. 3.375 Gram(s) IV Intermittent every 8 hours  propofol Infusion 10 MICROgram(s)/kG/Min (4.2 mL/Hr) IV Continuous <Continuous>  vancomycin  IVPB      vancomycin  IVPB 1000 milliGRAM(s) IV Intermittent every 12 hours    MEDICATIONS  (PRN):      ALLERGIES:  Allergies    No Known Allergies    Intolerances        LABS:                        10.5   9.05  )-----------( 281      ( 26 Aug 2018 05:00 )             33.0     CBC Full  -  ( 26 Aug 2018 05:00 )  WBC Count : 9.05 K/uL  Hemoglobin : 10.5 g/dL  Hematocrit : 33.0 %  Platelet Count - Automated : 281 K/uL  Mean Cell Volume : 92.4 fL  Mean Cell Hemoglobin : 29.4 pg  Mean Cell Hemoglobin Concentration : 31.8 %  Auto Neutrophil # : 7.53 K/uL  Auto Lymphocyte # : 1.19 K/uL  Auto Monocyte # : 0.27 K/uL  Auto Eosinophil # : 0.00 K/uL  Auto Basophil # : 0.01 K/uL  Auto Neutrophil % : 83.2 %  Auto Lymphocyte % : 13.1 %  Auto Monocyte % : 3.0 %  Auto Eosinophil % : 0.0 %  Auto Basophil % : 0.1 %        142  |  108<H>  |  13  ----------------------------<  144<H>  4.3   |  23  |  0.94    Ca    8.5      26 Aug 2018 05:00  Phos  2.4     -  Mg     2.3         TPro  6.0  /  Alb  3.1<L>  /  TBili  0.4  /  DBili  x   /  AST  26  /  ALT  18  /  AlkPhos  54      Creatinine Trend: 0.94<--, 1.09<--  LIVER FUNCTIONS - ( 26 Aug 2018 05:00 )  Alb: 3.1 g/dL / Pro: 6.0 g/dL / ALK PHOS: 54 u/L / ALT: 18 u/L / AST: 26 u/L / GGT: x           PT/INR - ( 25 Aug 2018 03:17 )   PT: 11.3 SEC;   INR: 1.02          PTT - ( 25 Aug 2018 03:17 )  PTT:31.6 SEC      ABG - ( 26 Aug 2018 05:00 )  pH, Arterial: 7.32  pH, Blood: x     /  pCO2: 50    /  pO2: 83    / HCO3: 24    / Base Excess: -0.3  /  SaO2: 96.3              Urinalysis Basic - ( 25 Aug 2018 05:00 )    Color: YELLOW / Appearance: CLEAR / S.030 / pH: 5.5  Gluc: 500 / Ketone: NEGATIVE  / Bili: NEGATIVE / Urobili: NORMAL   Blood: MODERATE / Protein: 30 / Nitrite: NEGATIVE   Leuk Esterase: TRACE / RBC: 25-50 / WBC 5-10   Sq Epi: FEW / Non Sq Epi: x / Bacteria: x        MICROBIOLOGY:    Culture - Respiratory with Gram Stain (collected 25 Aug 2018 18:30)  Source: BRONCHIAL LAVAGE    Culture - Respiratory with Gram Stain (collected 25 Aug 2018 10:56)  Source: ENDOTRACHEAL SPECIMEN      IMAGING:    RADIOLOGY & ADDITIONAL TESTS: Reviewed. MICU Progress Note    INTERVAL HPI/OVERNIGHT EVENTS: Overnight pt had OG tube placed, no    SUBJECTIVE: Patient seen and examined at bedside.     OBJECTIVE: No overnight events. Unable to obtain ROS 2/2 intubation, sedation    VITAL SIGNS:  ICU Vital Signs Last 24 Hrs  T(C): 37.3 (26 Aug 2018 04:00), Max: 37.8 (25 Aug 2018 16:00)  T(F): 99.2 (26 Aug 2018 04:00), Max: 100 (25 Aug 2018 16:00)  HR: 70 (26 Aug 2018 07:00) (62 - 87)  BP: 115/66 (26 Aug 2018 07:00) (53/29 - 200/98)  BP(mean): 78 (26 Aug 2018 07:00) (35 - 150)  ABP: --  ABP(mean): --  RR: 20 (26 Aug 2018 07:00) (14 - 21)  SpO2: 100% (26 Aug 2018 07:00) (91% - 100%)    Mode: AC/ CMV (Assist Control/ Continuous Mandatory Ventilation), RR (machine): 20, TV (machine): 380, FiO2: 40, PEEP: 8, ITime: 1, MAP: 12, PIP: 24    08-25 @ 07:01  -  08-26 @ 07:00  --------------------------------------------------------  IN: 1800.2 mL / OUT: 3690 mL / NET: -1889.8 mL      CAPILLARY BLOOD GLUCOSE      POCT Blood Glucose.: 137 mg/dL (26 Aug 2018 05:38)      PHYSICAL EXAM:    General: sedated  HEENT: NC/AT; PERRL, clear conjunctiva  Neck: supple  Respiratory: Coarse breath sounds in anterior lung fields bilaterally  Cardiovascular: +S1/S2; RRR  Abdomen: soft, NT/ND; +BS x4  Extremities: WWP, 2+ peripheral pulses b/l; no LE edema  Skin: normal color and turgor; no rash  Neurological: intubated, sedated    MEDICATIONS:  MEDICATIONS  (STANDING):  ALBUTerol    90 MICROgram(s) HFA Inhaler 2 Puff(s) Inhalation every 6 hours  azithromycin  IVPB 500 milliGRAM(s) IV Intermittent daily  enoxaparin Injectable 40 milliGRAM(s) SubCutaneous every 24 hours  fentaNYL   Infusion. 0.5 MICROgram(s)/kG/Hr (3.5 mL/Hr) IV Continuous <Continuous>  hydrocortisone sodium succinate Injectable 100 milliGRAM(s) IV Push every 8 hours  ipratropium 17 MICROgram(s) HFA Inhaler 1 Puff(s) Inhalation every 6 hours  norepinephrine Infusion 0.07 MICROgram(s)/kG/Min (9.188 mL/Hr) IV Continuous <Continuous>  piperacillin/tazobactam IVPB. 3.375 Gram(s) IV Intermittent every 8 hours  propofol Infusion 10 MICROgram(s)/kG/Min (4.2 mL/Hr) IV Continuous <Continuous>  vancomycin  IVPB      vancomycin  IVPB 1000 milliGRAM(s) IV Intermittent every 12 hours    MEDICATIONS  (PRN):      ALLERGIES:  Allergies    No Known Allergies    Intolerances        LABS:                        10.5   9.05  )-----------( 281      ( 26 Aug 2018 05:00 )             33.0     CBC Full  -  ( 26 Aug 2018 05:00 )  WBC Count : 9.05 K/uL  Hemoglobin : 10.5 g/dL  Hematocrit : 33.0 %  Platelet Count - Automated : 281 K/uL  Mean Cell Volume : 92.4 fL  Mean Cell Hemoglobin : 29.4 pg  Mean Cell Hemoglobin Concentration : 31.8 %  Auto Neutrophil # : 7.53 K/uL  Auto Lymphocyte # : 1.19 K/uL  Auto Monocyte # : 0.27 K/uL  Auto Eosinophil # : 0.00 K/uL  Auto Basophil # : 0.01 K/uL  Auto Neutrophil % : 83.2 %  Auto Lymphocyte % : 13.1 %  Auto Monocyte % : 3.0 %  Auto Eosinophil % : 0.0 %  Auto Basophil % : 0.1 %        142  |  108<H>  |  13  ----------------------------<  144<H>  4.3   |  23  |  0.94    Ca    8.5      26 Aug 2018 05:00  Phos  2.4     08-  Mg     2.3         TPro  6.0  /  Alb  3.1<L>  /  TBili  0.4  /  DBili  x   /  AST  26  /  ALT  18  /  AlkPhos  54      Creatinine Trend: 0.94<--, 1.09<--  LIVER FUNCTIONS - ( 26 Aug 2018 05:00 )  Alb: 3.1 g/dL / Pro: 6.0 g/dL / ALK PHOS: 54 u/L / ALT: 18 u/L / AST: 26 u/L / GGT: x           PT/INR - ( 25 Aug 2018 03:17 )   PT: 11.3 SEC;   INR: 1.02          PTT - ( 25 Aug 2018 03:17 )  PTT:31.6 SEC      ABG - ( 26 Aug 2018 05:00 )  pH, Arterial: 7.32  pH, Blood: x     /  pCO2: 50    /  pO2: 83    / HCO3: 24    / Base Excess: -0.3  /  SaO2: 96.3              Urinalysis Basic - ( 25 Aug 2018 05:00 )    Color: YELLOW / Appearance: CLEAR / S.030 / pH: 5.5  Gluc: 500 / Ketone: NEGATIVE  / Bili: NEGATIVE / Urobili: NORMAL   Blood: MODERATE / Protein: 30 / Nitrite: NEGATIVE   Leuk Esterase: TRACE / RBC: 25-50 / WBC 5-10   Sq Epi: FEW / Non Sq Epi: x / Bacteria: x        MICROBIOLOGY:    Culture - Respiratory with Gram Stain (collected 25 Aug 2018 18:30)  Source: BRONCHIAL LAVAGE    Culture - Respiratory with Gram Stain (collected 25 Aug 2018 10:56)  Source: ENDOTRACHEAL SPECIMEN      IMAGING:    RADIOLOGY & ADDITIONAL TESTS: Reviewed.

## 2018-08-26 NOTE — PROGRESS NOTE ADULT - ASSESSMENT
The patient is a 60 year old female with a past medical history of sarcoidosis, PPM, and TIA who presents with SOB and AMS, urgently intubated due to hypoxic hypercapnic respiratory failure and admitted to the MICU for management.     #Neuro  - Intubated, sedated  - CT Head negative for acute bleed    #Resp  - Hypoxic hypercapnic respiratory failure. Initial pH 6.97, PCO2>110, saturating 80% on NRB.   - Intubated, post intubated VBG with improving hypercapnia  - CXR with pulmonary edema, meets mod-severe ARDS criteria  - active smoker, will c/w atrovent and albuterol  - Sepsis vs Acute HF exacerbation  - Pt on steroids at home, will continue stress dose steroids, solucortef 100mg Q8H    #Cardio   - On levophed due to hypotension 80s/40s post intubated (likely sedation induced, as pt initially was hypertensive upon presentation)   - EKG with LBBB. Troponin negative.   - .   - Pending cortisol      #ID  Lactic acidosis, metabolic acidosis, mild leukocytosis    Afebrile  - Cultures pending, urine legionella pending  - Will c/w vanc/zosyn/azithro    #Metabolic  - Monitor Cr, urine output     FEN/GI  - NPO on tube feeds    #Prophylaxis   - Venodynes for DVT prophylaxis The patient is a 60 year old female with a past medical history of sarcoidosis, PPM, and TIA who presents with SOB and AMS, urgently intubated due to hypoxic hypercapnic respiratory failure and admitted to the MICU for management.     #Neuro  - Intubated, sedated  - CT Head negative for acute bleed    #Resp  - Hypoxic hypercapnic respiratory failure. Initubated in ED, most recent ABG showing pH of 7.38. Tidal volume increased in AM, will recheck abg in PM  - CXR on admission with pulmonary edema, meets mod-severe ARDS criteria, repeat cxr improved after IV lasix  - active smoker, will c/w atrovent and albuterol  - Sepsis vs Acute HF exacerbation, improving   - Pt on steroids at home, will continue stress dose steroids, decrease solucortef 100mg Q8H to Q12H    #Cardio   - On levophed due to hypotension 80s/40s post intubated (likely sedation induced, as pt initially was hypertensive upon presentation)   - EKG with LBBB. Troponin negative.   - .   - Pending cortisol      #ID  Lactic acidosis, metabolic acidosis, mild leukocytosis    Afebrile  - Cultures pending, urine legionella pending  - Will c/w vanc/zosyn/azithro    #Metabolic  - Monitor Cr, urine output     FEN/GI  - NPO on tube feeds    #Prophylaxis   - Venodynes for DVT prophylaxis The patient is a 60 year old female with a past medical history of sarcoidosis, PPM, and TIA who presents with SOB and AMS, urgently intubated due to hypoxic hypercapnic respiratory failure and admitted to the MICU for management.     #Neuro  - Intubated, sedated  - CT Head negative for acute bleed    #Resp  - Hypoxic hypercapnic respiratory failure. Initubated in ED, most recent ABG showing pH of 7.38. Tidal volume increased in AM, will recheck abg in PM  - CXR on admission with pulmonary edema, meets mod-severe ARDS criteria, repeat cxr improved after IV lasix  - active smoker, will c/w atrovent and albuterol  - Sepsis vs Acute HF exacerbation, improving   - Pt on steroids at home, will continue stress dose steroids, decrease solucortef 100mg Q8H to Q12H  - recheck ABG in PM for vent settings. SBT tomorrow if cont to improve    #Cardio   - On levophed due to hypotension 80s/40s post intubated (likely sedation induced, as pt initially was hypertensive upon presentation)   - EKG with LBBB. Troponin negative.   - .   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 Acute resp failure possible 2/2 acute CHF exacerbation, given another dose of lasix 40mg IV this AM with > 1L output. Will hold off on any additional lasix and reassess volume status tomorrow    #ID  - pt with mild leukocytosis, consolidations bilaterally on POCUS  - Cultures pending, urine legionella pending  - Will c/w vanc/zosyn/azithro    #Metabolic  - Monitor Cr, urine output     FEN/GI  - NPO on tube feeds    #Prophylaxis   - Venodynes for DVT prophylaxis

## 2018-08-27 LAB
BUN SERPL-MCNC: 14 MG/DL — SIGNIFICANT CHANGE UP (ref 7–23)
CALCIUM SERPL-MCNC: 8.6 MG/DL — SIGNIFICANT CHANGE UP (ref 8.4–10.5)
CHLORIDE SERPL-SCNC: 105 MMOL/L — SIGNIFICANT CHANGE UP (ref 98–107)
CO2 SERPL-SCNC: 24 MMOL/L — SIGNIFICANT CHANGE UP (ref 22–31)
CREAT SERPL-MCNC: 0.72 MG/DL — SIGNIFICANT CHANGE UP (ref 0.5–1.3)
GLUCOSE SERPL-MCNC: 166 MG/DL — HIGH (ref 70–99)
HCT VFR BLD CALC: 30.2 % — LOW (ref 34.5–45)
HGB BLD-MCNC: 10 G/DL — LOW (ref 11.5–15.5)
MAGNESIUM SERPL-MCNC: 2.4 MG/DL — SIGNIFICANT CHANGE UP (ref 1.6–2.6)
MCHC RBC-ENTMCNC: 30.7 PG — SIGNIFICANT CHANGE UP (ref 27–34)
MCHC RBC-ENTMCNC: 33.1 % — SIGNIFICANT CHANGE UP (ref 32–36)
MCV RBC AUTO: 92.6 FL — SIGNIFICANT CHANGE UP (ref 80–100)
NRBC # FLD: 0 — SIGNIFICANT CHANGE UP
PHOSPHATE SERPL-MCNC: 2.1 MG/DL — LOW (ref 2.5–4.5)
PLATELET # BLD AUTO: 261 K/UL — SIGNIFICANT CHANGE UP (ref 150–400)
PMV BLD: 10 FL — SIGNIFICANT CHANGE UP (ref 7–13)
POTASSIUM SERPL-MCNC: 3.7 MMOL/L — SIGNIFICANT CHANGE UP (ref 3.5–5.3)
POTASSIUM SERPL-SCNC: 3.7 MMOL/L — SIGNIFICANT CHANGE UP (ref 3.5–5.3)
PREALB SERPL-MCNC: 16 MG/DL — LOW (ref 20–40)
RBC # BLD: 3.26 M/UL — LOW (ref 3.8–5.2)
RBC # FLD: 14.5 % — SIGNIFICANT CHANGE UP (ref 10.3–14.5)
SODIUM SERPL-SCNC: 142 MMOL/L — SIGNIFICANT CHANGE UP (ref 135–145)
WBC # BLD: 10.94 K/UL — HIGH (ref 3.8–10.5)
WBC # FLD AUTO: 10.94 K/UL — HIGH (ref 3.8–10.5)

## 2018-08-27 PROCEDURE — 99291 CRITICAL CARE FIRST HOUR: CPT

## 2018-08-27 RX ORDER — FENTANYL CITRATE 50 UG/ML
50 INJECTION INTRAVENOUS ONCE
Qty: 0 | Refills: 0 | Status: DISCONTINUED | OUTPATIENT
Start: 2018-08-27 | End: 2018-08-27

## 2018-08-27 RX ORDER — POTASSIUM PHOSPHATE, MONOBASIC POTASSIUM PHOSPHATE, DIBASIC 236; 224 MG/ML; MG/ML
15 INJECTION, SOLUTION INTRAVENOUS ONCE
Qty: 0 | Refills: 0 | Status: COMPLETED | OUTPATIENT
Start: 2018-08-27 | End: 2018-08-27

## 2018-08-27 RX ORDER — FUROSEMIDE 40 MG
40 TABLET ORAL ONCE
Qty: 0 | Refills: 0 | Status: COMPLETED | OUTPATIENT
Start: 2018-08-27 | End: 2018-08-27

## 2018-08-27 RX ADMIN — Medication 100 MILLIGRAM(S): at 18:09

## 2018-08-27 RX ADMIN — FENTANYL CITRATE 3.5 MICROGRAM(S)/KG/HR: 50 INJECTION INTRAVENOUS at 07:57

## 2018-08-27 RX ADMIN — PROPOFOL 4.2 MICROGRAM(S)/KG/MIN: 10 INJECTION, EMULSION INTRAVENOUS at 05:06

## 2018-08-27 RX ADMIN — Medication 1 PUFF(S): at 03:16

## 2018-08-27 RX ADMIN — POTASSIUM PHOSPHATE, MONOBASIC POTASSIUM PHOSPHATE, DIBASIC 62.5 MILLIMOLE(S): 236; 224 INJECTION, SOLUTION INTRAVENOUS at 04:31

## 2018-08-27 RX ADMIN — Medication 1 PUFF(S): at 16:03

## 2018-08-27 RX ADMIN — ALBUTEROL 2 PUFF(S): 90 AEROSOL, METERED ORAL at 10:03

## 2018-08-27 RX ADMIN — Medication 1 PUFF(S): at 10:06

## 2018-08-27 RX ADMIN — ALBUTEROL 2 PUFF(S): 90 AEROSOL, METERED ORAL at 03:15

## 2018-08-27 RX ADMIN — PROPOFOL 4.2 MICROGRAM(S)/KG/MIN: 10 INJECTION, EMULSION INTRAVENOUS at 00:19

## 2018-08-27 RX ADMIN — PIPERACILLIN AND TAZOBACTAM 25 GRAM(S): 4; .5 INJECTION, POWDER, LYOPHILIZED, FOR SOLUTION INTRAVENOUS at 18:10

## 2018-08-27 RX ADMIN — PIPERACILLIN AND TAZOBACTAM 25 GRAM(S): 4; .5 INJECTION, POWDER, LYOPHILIZED, FOR SOLUTION INTRAVENOUS at 11:24

## 2018-08-27 RX ADMIN — FENTANYL CITRATE 50 MICROGRAM(S): 50 INJECTION INTRAVENOUS at 13:45

## 2018-08-27 RX ADMIN — Medication 250 MILLIGRAM(S): at 12:32

## 2018-08-27 RX ADMIN — Medication 1 PUFF(S): at 22:48

## 2018-08-27 RX ADMIN — CHLORHEXIDINE GLUCONATE 1 APPLICATION(S): 213 SOLUTION TOPICAL at 11:33

## 2018-08-27 RX ADMIN — ENOXAPARIN SODIUM 40 MILLIGRAM(S): 100 INJECTION SUBCUTANEOUS at 11:25

## 2018-08-27 RX ADMIN — PROPOFOL 4.2 MICROGRAM(S)/KG/MIN: 10 INJECTION, EMULSION INTRAVENOUS at 19:33

## 2018-08-27 RX ADMIN — ALBUTEROL 2 PUFF(S): 90 AEROSOL, METERED ORAL at 22:48

## 2018-08-27 RX ADMIN — Medication 100 MILLIGRAM(S): at 05:36

## 2018-08-27 RX ADMIN — Medication 40 MILLIGRAM(S): at 18:09

## 2018-08-27 RX ADMIN — FENTANYL CITRATE 3.5 MICROGRAM(S)/KG/HR: 50 INJECTION INTRAVENOUS at 02:17

## 2018-08-27 RX ADMIN — ALBUTEROL 2 PUFF(S): 90 AEROSOL, METERED ORAL at 16:01

## 2018-08-27 RX ADMIN — Medication 9.19 MICROGRAM(S)/KG/MIN: at 07:57

## 2018-08-27 RX ADMIN — PROPOFOL 4.2 MICROGRAM(S)/KG/MIN: 10 INJECTION, EMULSION INTRAVENOUS at 07:56

## 2018-08-27 RX ADMIN — PIPERACILLIN AND TAZOBACTAM 25 GRAM(S): 4; .5 INJECTION, POWDER, LYOPHILIZED, FOR SOLUTION INTRAVENOUS at 02:17

## 2018-08-27 NOTE — PROGRESS NOTE ADULT - SUBJECTIVE AND OBJECTIVE BOX
INTERVAL HPI/OVERNIGHT EVENTS:    SUBJECTIVE: Patient seen and examined at bedside.     ROS unable to be obtained as pt medically sedated.     OBJECTIVE:    VITAL SIGNS:  ICU Vital Signs Last 24 Hrs  T(C): 36.1 (27 Aug 2018 04:00), Max: 37.8 (26 Aug 2018 08:00)  T(F): 97 (27 Aug 2018 04:00), Max: 100 (26 Aug 2018 08:00)  HR: 63 (27 Aug 2018 06:00) (60 - 89)  BP: 144/69 (27 Aug 2018 06:00) (92/51 - 144/69)  BP(mean): 87 (27 Aug 2018 06:00) (57 - 92)  ABP: --  ABP(mean): --  RR: 20 (27 Aug 2018 06:00) (20 - 24)  SpO2: 100% (27 Aug 2018 06:00) (97% - 100%)    Mode: AC/ CMV (Assist Control/ Continuous Mandatory Ventilation), RR (machine): 20, TV (machine): 380, FiO2: 30, PEEP: 8, ITime: 1, MAP: 12.7, PIP: 23    08-26 @ 07:01  -  08-27 @ 07:00  --------------------------------------------------------  IN: 2529 mL / OUT: 3550 mL / NET: -1021 mL      CAPILLARY BLOOD GLUCOSE      POCT Blood Glucose.: 134 mg/dL (27 Aug 2018 06:02)      PHYSICAL EXAM:    General: Sedated  HEENT: NC/AT; PERRL, clear conjunctiva  Neck: supple  Respiratory: CTA b/l  Cardiovascular: +S1/S2; RRR  Abdomen: soft, NT/ND; +BS x4  Extremities: WWP, 2+ peripheral pulses b/l; no LE edema  Skin: normal color and turgor; no rash  Neurological: intubated, sedated     MEDICATIONS:  MEDICATIONS  (STANDING):  ALBUTerol    90 MICROgram(s) HFA Inhaler 2 Puff(s) Inhalation every 6 hours  chlorhexidine 4% Liquid 1 Application(s) Topical <User Schedule>  enoxaparin Injectable 40 milliGRAM(s) SubCutaneous every 24 hours  fentaNYL   Infusion. 0.5 MICROgram(s)/kG/Hr (3.5 mL/Hr) IV Continuous <Continuous>  hydrocortisone sodium succinate Injectable 100 milliGRAM(s) IV Push every 12 hours  ipratropium 17 MICROgram(s) HFA Inhaler 1 Puff(s) Inhalation every 6 hours  norepinephrine Infusion 0.07 MICROgram(s)/kG/Min (9.188 mL/Hr) IV Continuous <Continuous>  piperacillin/tazobactam IVPB. 3.375 Gram(s) IV Intermittent every 8 hours  propofol Infusion 10 MICROgram(s)/kG/Min (4.2 mL/Hr) IV Continuous <Continuous>  vancomycin  IVPB      vancomycin  IVPB 1000 milliGRAM(s) IV Intermittent every 12 hours    MEDICATIONS  (PRN):      ALLERGIES:  Allergies    No Known Allergies    Intolerances        LABS:                        10.0   10.94 )-----------( 261      ( 27 Aug 2018 02:57 )             30.2     08-27    142  |  105  |  14  ----------------------------<  166<H>  3.7   |  24  |  0.72    Ca    8.6      27 Aug 2018 02:57  Phos  2.1     08-27  Mg     2.4     08-27    TPro  6.0  /  Alb  3.1<L>  /  TBili  0.4  /  DBili  x   /  AST  26  /  ALT  18  /  AlkPhos  54  08-26          RADIOLOGY & ADDITIONAL TESTS: Reviewed. INTERVAL HPI/OVERNIGHT EVENTS:    SUBJECTIVE: Patient seen and examined at bedside. No acute events over night.     ROS unable to be obtained as pt medically sedated.     OBJECTIVE:    VITAL SIGNS:  ICU Vital Signs Last 24 Hrs  T(C): 36.1 (27 Aug 2018 04:00), Max: 37.8 (26 Aug 2018 08:00)  T(F): 97 (27 Aug 2018 04:00), Max: 100 (26 Aug 2018 08:00)  HR: 63 (27 Aug 2018 06:00) (60 - 89)  BP: 144/69 (27 Aug 2018 06:00) (92/51 - 144/69)  BP(mean): 87 (27 Aug 2018 06:00) (57 - 92)  ABP: --  ABP(mean): --  RR: 20 (27 Aug 2018 06:00) (20 - 24)  SpO2: 100% (27 Aug 2018 06:00) (97% - 100%)    Mode: AC/ CMV (Assist Control/ Continuous Mandatory Ventilation), RR (machine): 20, TV (machine): 380, FiO2: 30, PEEP: 8, ITime: 1, MAP: 12.7, PIP: 23    08-26 @ 07:01  -  08-27 @ 07:00  --------------------------------------------------------  IN: 2529 mL / OUT: 3550 mL / NET: -1021 mL      CAPILLARY BLOOD GLUCOSE      POCT Blood Glucose.: 134 mg/dL (27 Aug 2018 06:02)      PHYSICAL EXAM:    General: Sedated  HEENT: NC/AT; PERRL, clear conjunctiva  Neck: supple  Respiratory: CTA b/l  Cardiovascular: +S1/S2; RRR  Abdomen: soft, NT/ND; +BS x4  Extremities: WWP, 2+ peripheral pulses b/l; no LE edema  Skin: normal color and turgor; no rash  Neurological: intubated, sedated     MEDICATIONS:  MEDICATIONS  (STANDING):  ALBUTerol    90 MICROgram(s) HFA Inhaler 2 Puff(s) Inhalation every 6 hours  chlorhexidine 4% Liquid 1 Application(s) Topical <User Schedule>  enoxaparin Injectable 40 milliGRAM(s) SubCutaneous every 24 hours  fentaNYL   Infusion. 0.5 MICROgram(s)/kG/Hr (3.5 mL/Hr) IV Continuous <Continuous>  hydrocortisone sodium succinate Injectable 100 milliGRAM(s) IV Push every 12 hours  ipratropium 17 MICROgram(s) HFA Inhaler 1 Puff(s) Inhalation every 6 hours  norepinephrine Infusion 0.07 MICROgram(s)/kG/Min (9.188 mL/Hr) IV Continuous <Continuous>  piperacillin/tazobactam IVPB. 3.375 Gram(s) IV Intermittent every 8 hours  propofol Infusion 10 MICROgram(s)/kG/Min (4.2 mL/Hr) IV Continuous <Continuous>  vancomycin  IVPB      vancomycin  IVPB 1000 milliGRAM(s) IV Intermittent every 12 hours    MEDICATIONS  (PRN):      ALLERGIES:  Allergies    No Known Allergies    Intolerances        LABS:                        10.0   10.94 )-----------( 261      ( 27 Aug 2018 02:57 )             30.2     08-27    142  |  105  |  14  ----------------------------<  166<H>  3.7   |  24  |  0.72    Ca    8.6      27 Aug 2018 02:57  Phos  2.1     08-27  Mg     2.4     08-27    TPro  6.0  /  Alb  3.1<L>  /  TBili  0.4  /  DBili  x   /  AST  26  /  ALT  18  /  AlkPhos  54  08-26          RADIOLOGY & ADDITIONAL TESTS: Reviewed.

## 2018-08-27 NOTE — PROGRESS NOTE ADULT - ASSESSMENT
61 y/o F w/ a pmh significant for Sarcoidosis, PPM, and TIA who initially presented to the ED w/ a chief complaint of SOB and AMS- intubated and admitted to MICU due to hypoxic hypercapnic respiratory failure in setting of pulmonary edema and basilar PNA      #Neuro  - Intubated, sedated  - CT Head negative for acute bleed    #Resp  - Hypoxic hypercapnic respiratory failure.   - CXR on admission with pulmonary edema, meets mod-severe ARDS criteria, repeat cxr improved after IV lasix  - c/w Atrovent and albuterol  - Sepsis vs Acute HF exacerbation, improving   - c/w Solucortef 100mg Q12H  - Plan for SBT this AM    #Cardio   - On levophed due to hypotension 80s/40s post intubated (likely sedation induced, as pt initially was hypertensive upon presentation)   - EKG with LBBB. Troponin negative.   - .   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 Acute resp failure possible 2/2 acute CHF exacerbation, given another dose of lasix 40mg IV this AM with > 1L output. Will hold off on any additional lasix and reassess volume status tomorrow    #ID  - pt with mild leukocytosis, consolidations bilaterally on POCUS  - Cultures pending, urine legionella pending  - Will c/w vanc/zosyn/azithro    #Metabolic  - Monitor Cr, urine output     FEN/GI  - NPO on tube feeds    #Prophylaxis   - Venodynes for DVT prophylaxis 61 y/o F w/ a pmh significant for Sarcoidosis, PPM, and TIA who initially presented to the ED w/ a chief complaint of SOB and AMS- intubated and admitted to MICU due to hypoxic/hypercapnic respiratory failure in setting of pulmonary edema and basilar PNA      #Neuro  - Intubated, sedated  - CT Head negative for acute bleed    #Resp  - Hypoxic/hypercapnic respiratory failure ?2/2 acute CHF exacerbation   - CXR on admission with pulmonary edema, met mod-severe ARDS criteria w/ improved repeat CXR w/ IV Lasix   - c/w Atrovent and albuterol  - c/w Hydrocortisone 100mg Q12H  - Plan for SBT this AM    #Cardio   - c/w Norepinephrine wean as tolerated (likely sedation induced, as pt initially was hypertensive upon presentation)   - EKG with LBBB. Troponin negative.  on admission.   - POCUS showing RV dilation s/p Lasix 40mg IV yesterday AM with > 1L output.   - Hold on Furosemide today and reevaluate volume status today.     #ID  - Mild increase in Leukocytosis 10.9 in setting of Hydrocortisone vs. ?bibasilar consolidations on admission CXR (likely fluid overload)   - Can likely deescalate abx given negative Bcx and ET cultures.     #Metabolic  - Monitor Cr, urine output     FEN/GI  - NPO on tube feeds    #Prophylaxis   - Venodynes for DVT prophylaxis 61 y/o F w/ a pmh significant for Sarcoidosis, TIA and PPM who initially presented to the ED w/ a chief complaint of SOB and AMS- intubated and admitted to MICU due to hypoxic/hypercapnic respiratory failure in setting of pulmonary edema and basilar PNA      #Neuro  - Intubated, sedated  - CT Head negative for acute bleed    #Resp  - Hypoxic/hypercapnic respiratory failure ?2/2 acute CHF exacerbation   - CXR on admission with pulmonary edema, met mod-severe ARDS criteria w/ improved repeat CXR w/ IV Lasix   - c/w Atrovent and albuterol  - c/w Hydrocortisone 100mg Q12H  - Plan for SBT this AM    #Cardio   - c/w Norepinephrine wean as tolerated (likely sedation induced, as pt initially was hypertensive upon presentation)   - EKG with LBBB. Troponin negative.  on admission.   - POCUS showing RV dilation s/p Lasix 40mg IV yesterday AM with > 1L output.   - Hold on Furosemide today and reevaluate volume status today.     #ID  - Mild increase in Leukocytosis 10.9 in setting of Hydrocortisone vs. ?bibasilar consolidations on admission CXR (likely fluid overload)   - Can likely deescalate abx given negative Bcx and ET cultures.     #Metabolic  - Monitor Cr, urine output     FEN/GI  - NPO on tube feeds    #Prophylaxis   - Venodynes for DVT prophylaxis 61 y/o F w/ a pmh significant for Sarcoidosis, TIA and PPM who initially presented to the ED w/ a chief complaint of SOB and AMS- intubated and admitted to MICU due to hypoxic/hypercapnic respiratory failure in setting of pulmonary edema and basilar PNA      #Neuro  - Intubated, sedated  - CT Head negative for acute bleed    #Resp  - Hypoxic/hypercapnic respiratory failure of unclear etiology as POCUS shows normal LV systolic function  - CXR on admission with pulmonary edema, met mod-severe ARDS criteria w/ improved repeat CXR w/ IV Lasix   - c/w Atrovent and albuterol  - c/w Hydrocortisone 100mg Q12H  - Plan for SBT this AM    #Cardio   - Wean Norepinephrine  - POCUS showing RV dilation s/p Lasix 40mg IV yesterday AM with > 1L output.   - Hold on Furosemide today and reevaluate volume status today.     #ID  - Mild increase in Leukocytosis 10.9 in setting of Hydrocortisone vs. ?bibasilar consolidations on admission CXR (likely fluid overload)   - c/w abx (3/5)    #Metabolic  - Monitor Cr, urine output     FEN/GI  - NPO on tube feeds    #Prophylaxis   - Venodynes for DVT prophylaxis

## 2018-08-28 LAB
BACTERIA SPT RESP CULT: SIGNIFICANT CHANGE UP
BASOPHILS # BLD AUTO: 0.04 K/UL — SIGNIFICANT CHANGE UP (ref 0–0.2)
BASOPHILS NFR BLD AUTO: 0.3 % — SIGNIFICANT CHANGE UP (ref 0–2)
BUN SERPL-MCNC: 15 MG/DL — SIGNIFICANT CHANGE UP (ref 7–23)
CALCIUM SERPL-MCNC: 9.1 MG/DL — SIGNIFICANT CHANGE UP (ref 8.4–10.5)
CHLORIDE SERPL-SCNC: 101 MMOL/L — SIGNIFICANT CHANGE UP (ref 98–107)
CO2 SERPL-SCNC: 28 MMOL/L — SIGNIFICANT CHANGE UP (ref 22–31)
CREAT SERPL-MCNC: 0.77 MG/DL — SIGNIFICANT CHANGE UP (ref 0.5–1.3)
EOSINOPHIL # BLD AUTO: 0.05 K/UL — SIGNIFICANT CHANGE UP (ref 0–0.5)
EOSINOPHIL NFR BLD AUTO: 0.4 % — SIGNIFICANT CHANGE UP (ref 0–6)
GLUCOSE SERPL-MCNC: 120 MG/DL — HIGH (ref 70–99)
HCT VFR BLD CALC: 35.2 % — SIGNIFICANT CHANGE UP (ref 34.5–45)
HGB BLD-MCNC: 11.1 G/DL — LOW (ref 11.5–15.5)
IMM GRANULOCYTES # BLD AUTO: 0.07 # — SIGNIFICANT CHANGE UP
IMM GRANULOCYTES NFR BLD AUTO: 0.6 % — SIGNIFICANT CHANGE UP (ref 0–1.5)
LYMPHOCYTES # BLD AUTO: 2.52 K/UL — SIGNIFICANT CHANGE UP (ref 1–3.3)
LYMPHOCYTES # BLD AUTO: 20.5 % — SIGNIFICANT CHANGE UP (ref 13–44)
MAGNESIUM SERPL-MCNC: 2.6 MG/DL — SIGNIFICANT CHANGE UP (ref 1.6–2.6)
MCHC RBC-ENTMCNC: 29.3 PG — SIGNIFICANT CHANGE UP (ref 27–34)
MCHC RBC-ENTMCNC: 31.5 % — LOW (ref 32–36)
MCV RBC AUTO: 92.9 FL — SIGNIFICANT CHANGE UP (ref 80–100)
MONOCYTES # BLD AUTO: 0.83 K/UL — SIGNIFICANT CHANGE UP (ref 0–0.9)
MONOCYTES NFR BLD AUTO: 6.7 % — SIGNIFICANT CHANGE UP (ref 2–14)
NEUTROPHILS # BLD AUTO: 8.81 K/UL — HIGH (ref 1.8–7.4)
NEUTROPHILS NFR BLD AUTO: 71.5 % — SIGNIFICANT CHANGE UP (ref 43–77)
NON-GYNECOLOGICAL CYTOLOGY STUDY: SIGNIFICANT CHANGE UP
NRBC # FLD: 0.04 — SIGNIFICANT CHANGE UP
PHOSPHATE SERPL-MCNC: 2.2 MG/DL — LOW (ref 2.5–4.5)
PLATELET # BLD AUTO: 217 K/UL — SIGNIFICANT CHANGE UP (ref 150–400)
PMV BLD: 10.7 FL — SIGNIFICANT CHANGE UP (ref 7–13)
POTASSIUM SERPL-MCNC: 3.6 MMOL/L — SIGNIFICANT CHANGE UP (ref 3.5–5.3)
POTASSIUM SERPL-SCNC: 3.6 MMOL/L — SIGNIFICANT CHANGE UP (ref 3.5–5.3)
RBC # BLD: 3.79 M/UL — LOW (ref 3.8–5.2)
RBC # FLD: 14.4 % — SIGNIFICANT CHANGE UP (ref 10.3–14.5)
SODIUM SERPL-SCNC: 143 MMOL/L — SIGNIFICANT CHANGE UP (ref 135–145)
SPECIMEN SOURCE: SIGNIFICANT CHANGE UP
WBC # BLD: 12.32 K/UL — HIGH (ref 3.8–10.5)
WBC # FLD AUTO: 12.32 K/UL — HIGH (ref 3.8–10.5)

## 2018-08-28 PROCEDURE — 99291 CRITICAL CARE FIRST HOUR: CPT

## 2018-08-28 RX ORDER — FENTANYL CITRATE 50 UG/ML
1 INJECTION INTRAVENOUS
Qty: 2500 | Refills: 0 | Status: DISCONTINUED | OUTPATIENT
Start: 2018-08-28 | End: 2018-08-30

## 2018-08-28 RX ORDER — PROPOFOL 10 MG/ML
9.94 INJECTION, EMULSION INTRAVENOUS
Qty: 1000 | Refills: 0 | Status: DISCONTINUED | OUTPATIENT
Start: 2018-08-28 | End: 2018-08-30

## 2018-08-28 RX ORDER — FENTANYL CITRATE 50 UG/ML
100 INJECTION INTRAVENOUS ONCE
Qty: 0 | Refills: 0 | Status: DISCONTINUED | OUTPATIENT
Start: 2018-08-28 | End: 2018-08-28

## 2018-08-28 RX ORDER — FENTANYL CITRATE 50 UG/ML
50 INJECTION INTRAVENOUS ONCE
Qty: 0 | Refills: 0 | Status: DISCONTINUED | OUTPATIENT
Start: 2018-08-28 | End: 2018-08-28

## 2018-08-28 RX ORDER — SODIUM CHLORIDE 9 MG/ML
4 INJECTION INTRAMUSCULAR; INTRAVENOUS; SUBCUTANEOUS
Qty: 0 | Refills: 0 | Status: DISCONTINUED | OUTPATIENT
Start: 2018-08-28 | End: 2018-09-07

## 2018-08-28 RX ORDER — FUROSEMIDE 40 MG
40 TABLET ORAL ONCE
Qty: 0 | Refills: 0 | Status: COMPLETED | OUTPATIENT
Start: 2018-08-28 | End: 2018-08-28

## 2018-08-28 RX ORDER — MIDAZOLAM HYDROCHLORIDE 1 MG/ML
2 INJECTION, SOLUTION INTRAMUSCULAR; INTRAVENOUS ONCE
Qty: 0 | Refills: 0 | Status: DISCONTINUED | OUTPATIENT
Start: 2018-08-28 | End: 2018-08-28

## 2018-08-28 RX ORDER — DEXMEDETOMIDINE HYDROCHLORIDE IN 0.9% SODIUM CHLORIDE 4 UG/ML
0.05 INJECTION INTRAVENOUS
Qty: 200 | Refills: 0 | Status: DISCONTINUED | OUTPATIENT
Start: 2018-08-28 | End: 2018-08-28

## 2018-08-28 RX ADMIN — Medication 100 MILLIGRAM(S): at 05:39

## 2018-08-28 RX ADMIN — PIPERACILLIN AND TAZOBACTAM 25 GRAM(S): 4; .5 INJECTION, POWDER, LYOPHILIZED, FOR SOLUTION INTRAVENOUS at 19:40

## 2018-08-28 RX ADMIN — CHLORHEXIDINE GLUCONATE 1 APPLICATION(S): 213 SOLUTION TOPICAL at 05:39

## 2018-08-28 RX ADMIN — ALBUTEROL 2 PUFF(S): 90 AEROSOL, METERED ORAL at 03:12

## 2018-08-28 RX ADMIN — Medication 250 MILLIGRAM(S): at 00:10

## 2018-08-28 RX ADMIN — FENTANYL CITRATE 100 MICROGRAM(S): 50 INJECTION INTRAVENOUS at 21:45

## 2018-08-28 RX ADMIN — FENTANYL CITRATE 50 MICROGRAM(S): 50 INJECTION INTRAVENOUS at 19:50

## 2018-08-28 RX ADMIN — PROPOFOL 4.2 MICROGRAM(S)/KG/MIN: 10 INJECTION, EMULSION INTRAVENOUS at 07:27

## 2018-08-28 RX ADMIN — PROPOFOL 4.2 MICROGRAM(S)/KG/MIN: 10 INJECTION, EMULSION INTRAVENOUS at 19:40

## 2018-08-28 RX ADMIN — FENTANYL CITRATE 100 MICROGRAM(S): 50 INJECTION INTRAVENOUS at 13:27

## 2018-08-28 RX ADMIN — DEXMEDETOMIDINE HYDROCHLORIDE IN 0.9% SODIUM CHLORIDE 0.88 MICROGRAM(S)/KG/HR: 4 INJECTION INTRAVENOUS at 12:33

## 2018-08-28 RX ADMIN — Medication 1 PUFF(S): at 09:50

## 2018-08-28 RX ADMIN — PIPERACILLIN AND TAZOBACTAM 25 GRAM(S): 4; .5 INJECTION, POWDER, LYOPHILIZED, FOR SOLUTION INTRAVENOUS at 03:03

## 2018-08-28 RX ADMIN — Medication 250 MILLIGRAM(S): at 12:19

## 2018-08-28 RX ADMIN — Medication 1 PUFF(S): at 03:12

## 2018-08-28 RX ADMIN — Medication 100 MILLIGRAM(S): at 18:47

## 2018-08-28 RX ADMIN — Medication 1 PUFF(S): at 15:57

## 2018-08-28 RX ADMIN — FENTANYL CITRATE 50 MICROGRAM(S): 50 INJECTION INTRAVENOUS at 22:15

## 2018-08-28 RX ADMIN — PIPERACILLIN AND TAZOBACTAM 25 GRAM(S): 4; .5 INJECTION, POWDER, LYOPHILIZED, FOR SOLUTION INTRAVENOUS at 13:30

## 2018-08-28 RX ADMIN — ALBUTEROL 2 PUFF(S): 90 AEROSOL, METERED ORAL at 09:50

## 2018-08-28 RX ADMIN — Medication 40 MILLIGRAM(S): at 22:05

## 2018-08-28 RX ADMIN — ENOXAPARIN SODIUM 40 MILLIGRAM(S): 100 INJECTION SUBCUTANEOUS at 10:21

## 2018-08-28 RX ADMIN — ALBUTEROL 2 PUFF(S): 90 AEROSOL, METERED ORAL at 15:57

## 2018-08-28 RX ADMIN — FENTANYL CITRATE 100 MICROGRAM(S): 50 INJECTION INTRAVENOUS at 13:30

## 2018-08-28 RX ADMIN — MIDAZOLAM HYDROCHLORIDE 2 MILLIGRAM(S): 1 INJECTION, SOLUTION INTRAMUSCULAR; INTRAVENOUS at 22:55

## 2018-08-28 NOTE — PROGRESS NOTE ADULT - SUBJECTIVE AND OBJECTIVE BOX
INTERVAL HPI/OVERNIGHT EVENTS:    SUBJECTIVE: Patient seen and examined at bedside.     CONSTITUTIONAL: No weakness, fevers or chills  EYES/ENT: No visual changes;  No vertigo or throat pain   NECK: No pain or stiffness  RESPIRATORY: No cough, wheezing, hemoptysis; No shortness of breath  CARDIOVASCULAR: No chest pain or palpitations  GASTROINTESTINAL: No abdominal or epigastric pain. No nausea, vomiting, or hematemesis; No diarrhea or constipation. No melena or hematochezia.  GENITOURINARY: No dysuria, frequency or hematuria  NEUROLOGICAL: No numbness or weakness  SKIN: No itching, rashes    OBJECTIVE:    VITAL SIGNS:  ICU Vital Signs Last 24 Hrs  T(C): 37.3 (28 Aug 2018 00:00), Max: 37.3 (28 Aug 2018 00:00)  T(F): 99.1 (28 Aug 2018 00:00), Max: 99.1 (28 Aug 2018 00:00)  HR: 74 (28 Aug 2018 06:48) (60 - 96)  BP: 133/62 (28 Aug 2018 05:00) (83/49 - 167/85)  BP(mean): 81 (28 Aug 2018 05:00) (55 - 106)  ABP: --  ABP(mean): --  RR: 20 (28 Aug 2018 05:00) (11 - 24)  SpO2: 98% (28 Aug 2018 06:48) (94% - 100%)    Mode: AC/ CMV (Assist Control/ Continuous Mandatory Ventilation), RR (machine): 20, TV (machine): 380, FiO2: 30, PEEP: 8, MAP: 13, PIP: 28    08-27 @ 07:01  -  08-28 @ 07:00  --------------------------------------------------------  IN: 2086.5 mL / OUT: 1725 mL / NET: 361.5 mL      CAPILLARY BLOOD GLUCOSE      POCT Blood Glucose.: 118 mg/dL (28 Aug 2018 05:43)      PHYSICAL EXAM:    General: NAD  HEENT: NC/AT; PERRL, clear conjunctiva  Neck: supple  Respiratory: CTA b/l  Cardiovascular: +S1/S2; RRR  Abdomen: soft, NT/ND; +BS x4  Extremities: WWP, 2+ peripheral pulses b/l; no LE edema  Skin: normal color and turgor; no rash  Neurological:    MEDICATIONS:  MEDICATIONS  (STANDING):  ALBUTerol    90 MICROgram(s) HFA Inhaler 2 Puff(s) Inhalation every 6 hours  chlorhexidine 4% Liquid 1 Application(s) Topical <User Schedule>  enoxaparin Injectable 40 milliGRAM(s) SubCutaneous every 24 hours  hydrocortisone sodium succinate Injectable 100 milliGRAM(s) IV Push every 12 hours  ipratropium 17 MICROgram(s) HFA Inhaler 1 Puff(s) Inhalation every 6 hours  piperacillin/tazobactam IVPB. 3.375 Gram(s) IV Intermittent every 8 hours  propofol Infusion 10 MICROgram(s)/kG/Min (4.2 mL/Hr) IV Continuous <Continuous>  vancomycin  IVPB      vancomycin  IVPB 1000 milliGRAM(s) IV Intermittent every 12 hours    MEDICATIONS  (PRN):      ALLERGIES:  Allergies    No Known Allergies    Intolerances        LABS:                        11.1   12.32 )-----------( 217      ( 28 Aug 2018 03:00 )             35.2     08-28    143  |  101  |  15  ----------------------------<  120<H>  3.6   |  28  |  0.77    Ca    9.1      28 Aug 2018 03:00  Phos  2.2     08-28  Mg     2.6     08-28            RADIOLOGY & ADDITIONAL TESTS: Reviewed. INTERVAL HPI/OVERNIGHT EVENTS:    SUBJECTIVE: Patient seen and examined at bedside. No acute events over night.       OBJECTIVE:    VITAL SIGNS:  ICU Vital Signs Last 24 Hrs  T(C): 37.3 (28 Aug 2018 00:00), Max: 37.3 (28 Aug 2018 00:00)  T(F): 99.1 (28 Aug 2018 00:00), Max: 99.1 (28 Aug 2018 00:00)  HR: 74 (28 Aug 2018 06:48) (60 - 96)  BP: 133/62 (28 Aug 2018 05:00) (83/49 - 167/85)  BP(mean): 81 (28 Aug 2018 05:00) (55 - 106)  ABP: --  ABP(mean): --  RR: 20 (28 Aug 2018 05:00) (11 - 24)  SpO2: 98% (28 Aug 2018 06:48) (94% - 100%)    Mode: AC/ CMV (Assist Control/ Continuous Mandatory Ventilation), RR (machine): 20, TV (machine): 380, FiO2: 30, PEEP: 8, MAP: 13, PIP: 28    08-27 @ 07:01  -  08-28 @ 07:00  --------------------------------------------------------  IN: 2086.5 mL / OUT: 1725 mL / NET: 361.5 mL      CAPILLARY BLOOD GLUCOSE      POCT Blood Glucose.: 118 mg/dL (28 Aug 2018 05:43)      PHYSICAL EXAM:    General: NAD  HEENT: NC/AT; PERRL, clear conjunctiva  Neck: supple  Respiratory: Intubated sedated   Cardiovascular: +S1/S2; RRR  Abdomen: soft, NT/ND; +BS x4  Extremities: WWP, 2+ peripheral pulses b/l; no LE edema  Skin: normal color and turgor; no rash  Neurological:    MEDICATIONS:  MEDICATIONS  (STANDING):  ALBUTerol    90 MICROgram(s) HFA Inhaler 2 Puff(s) Inhalation every 6 hours  chlorhexidine 4% Liquid 1 Application(s) Topical <User Schedule>  enoxaparin Injectable 40 milliGRAM(s) SubCutaneous every 24 hours  hydrocortisone sodium succinate Injectable 100 milliGRAM(s) IV Push every 12 hours  ipratropium 17 MICROgram(s) HFA Inhaler 1 Puff(s) Inhalation every 6 hours  piperacillin/tazobactam IVPB. 3.375 Gram(s) IV Intermittent every 8 hours  propofol Infusion 10 MICROgram(s)/kG/Min (4.2 mL/Hr) IV Continuous <Continuous>  vancomycin  IVPB      vancomycin  IVPB 1000 milliGRAM(s) IV Intermittent every 12 hours    MEDICATIONS  (PRN):      ALLERGIES:  Allergies    No Known Allergies    Intolerances        LABS:                        11.1   12.32 )-----------( 217      ( 28 Aug 2018 03:00 )             35.2     08-28    143  |  101  |  15  ----------------------------<  120<H>  3.6   |  28  |  0.77    Ca    9.1      28 Aug 2018 03:00  Phos  2.2     08-28  Mg     2.6     08-28            RADIOLOGY & ADDITIONAL TESTS: Reviewed. INTERVAL HPI/OVERNIGHT EVENTS:      SUBJECTIVE: Patient seen and examined at bedside. Pt agitated over night- required Versed and Fentanyl pushes.      OBJECTIVE:    VITAL SIGNS:  ICU Vital Signs Last 24 Hrs  T(C): 37.3 (28 Aug 2018 00:00), Max: 37.3 (28 Aug 2018 00:00)  T(F): 99.1 (28 Aug 2018 00:00), Max: 99.1 (28 Aug 2018 00:00)  HR: 74 (28 Aug 2018 06:48) (60 - 96)  BP: 133/62 (28 Aug 2018 05:00) (83/49 - 167/85)  BP(mean): 81 (28 Aug 2018 05:00) (55 - 106)  ABP: --  ABP(mean): --  RR: 20 (28 Aug 2018 05:00) (11 - 24)  SpO2: 98% (28 Aug 2018 06:48) (94% - 100%)    Mode: AC/ CMV (Assist Control/ Continuous Mandatory Ventilation), RR (machine): 20, TV (machine): 380, FiO2: 30, PEEP: 8, MAP: 13, PIP: 28    08-27 @ 07:01  -  08-28 @ 07:00  --------------------------------------------------------  IN: 2086.5 mL / OUT: 1725 mL / NET: 361.5 mL      CAPILLARY BLOOD GLUCOSE      POCT Blood Glucose.: 118 mg/dL (28 Aug 2018 05:43)      PHYSICAL EXAM:    General: NAD  HEENT: NC/AT; PERRL, clear conjunctiva  Neck: supple  Respiratory: Intubated sedated   Cardiovascular: +S1/S2; RRR  Abdomen: soft, NT/ND; +BS x4  Extremities: WWP, 2+ peripheral pulses b/l; no LE edema  Skin: normal color and turgor; no rash  Neurological:    MEDICATIONS:  MEDICATIONS  (STANDING):  ALBUTerol    90 MICROgram(s) HFA Inhaler 2 Puff(s) Inhalation every 6 hours  chlorhexidine 4% Liquid 1 Application(s) Topical <User Schedule>  enoxaparin Injectable 40 milliGRAM(s) SubCutaneous every 24 hours  hydrocortisone sodium succinate Injectable 100 milliGRAM(s) IV Push every 12 hours  ipratropium 17 MICROgram(s) HFA Inhaler 1 Puff(s) Inhalation every 6 hours  piperacillin/tazobactam IVPB. 3.375 Gram(s) IV Intermittent every 8 hours  propofol Infusion 10 MICROgram(s)/kG/Min (4.2 mL/Hr) IV Continuous <Continuous>  vancomycin  IVPB      vancomycin  IVPB 1000 milliGRAM(s) IV Intermittent every 12 hours    MEDICATIONS  (PRN):      ALLERGIES:  Allergies    No Known Allergies    Intolerances        LABS:                        11.1   12.32 )-----------( 217      ( 28 Aug 2018 03:00 )             35.2     08-28    143  |  101  |  15  ----------------------------<  120<H>  3.6   |  28  |  0.77    Ca    9.1      28 Aug 2018 03:00  Phos  2.2     08-28  Mg     2.6     08-28            RADIOLOGY & ADDITIONAL TESTS: Reviewed.

## 2018-08-28 NOTE — PROGRESS NOTE ADULT - ASSESSMENT
61 y/o F w/ a pmh significant for Sarcoidosis, TIA and PPM who initially presented to the ED w/ a chief complaint of SOB and AMS- intubated and admitted to MICU due to hypoxic/hypercapnic respiratory failure in setting of pulmonary edema and basilar PNA      #Neuro  - Intubated, sedated  - CT Head negative for acute bleed    #Resp  - Hypoxic/hypercapnic respiratory failure of unclear etiology as POCUS shows normal LV systolic function  - CXR on admission with pulmonary edema, met mod-severe ARDS criteria w/ improved repeat CXR w/ IV Lasix   - c/w Atrovent and albuterol  - c/w Hydrocortisone 100mg Q12H  - Plan for SBT this AM    #Cardio   - Wean Norepinephrine  - POCUS showing RV dilation s/p Lasix 40mg IV yesterday AM with > 1L output.   - Hold on Furosemide today and reevaluate volume status today.     #ID  - Mild increase in Leukocytosis 10.9 in setting of Hydrocortisone vs. ?bibasilar consolidations on admission CXR (likely fluid overload)   - c/w abx (3/5)    #Metabolic  - Monitor Cr, urine output     FEN/GI  - NPO on tube feeds    #Prophylaxis   - Venodynes for DVT prophylaxis 59 y/o F w/ a pmh significant for Sarcoidosis, TIA and PPM who initially presented to the ED w/ a chief complaint of SOB and AMS- intubated and admitted to MICU due to hypoxic/hypercapnic respiratory failure in setting of pulmonary edema and basilar PNA      #Neuro  - Intubated, sedated  - CT Head negative for acute bleed    #Resp  - Hypoxic/hypercapnic respiratory failure of unclear etiology as POCUS shows normal LV systolic function  - CXR on admission with pulmonary edema, met mod-severe ARDS criteria w/ improved repeat CXR w/ IV Lasix   - c/w Atrovent and albuterol  - c/w Hydrocortisone 100mg Q12H  - Plan for SBT this AM    #Cardio   - Wean Norepinephrine  - POCUS showing RV dilation s/p Lasix 40mg IV yesterday AM with > 1L output.   - Hold on Furosemide today and reevaluate volume status today.     #ID  - Mild increase in Leukocytosis 10.9 in setting of Hydrocortisone vs. ?bibasilar consolidations on admission CXR (likely fluid overload)   - c/w abx (4/5)    #Metabolic  - Monitor Cr, urine output     FEN/GI  - NPO on tube feeds    #Prophylaxis   - Venodynes for DVT prophylaxis

## 2018-08-29 LAB
ALBUMIN SERPL ELPH-MCNC: 2.7 G/DL — LOW (ref 3.3–5)
ALP SERPL-CCNC: 63 U/L — SIGNIFICANT CHANGE UP (ref 40–120)
ALT FLD-CCNC: 46 U/L — HIGH (ref 4–33)
AST SERPL-CCNC: 50 U/L — HIGH (ref 4–32)
BACTERIA SPT RESP CULT: SIGNIFICANT CHANGE UP
BASE EXCESS BLDA CALC-SCNC: 7.6 MMOL/L — SIGNIFICANT CHANGE UP
BASOPHILS # BLD AUTO: 0.04 K/UL — SIGNIFICANT CHANGE UP (ref 0–0.2)
BASOPHILS NFR BLD AUTO: 0.4 % — SIGNIFICANT CHANGE UP (ref 0–2)
BILIRUB SERPL-MCNC: 0.4 MG/DL — SIGNIFICANT CHANGE UP (ref 0.2–1.2)
BUN SERPL-MCNC: 19 MG/DL — SIGNIFICANT CHANGE UP (ref 7–23)
CALCIUM SERPL-MCNC: 8.6 MG/DL — SIGNIFICANT CHANGE UP (ref 8.4–10.5)
CHLORIDE SERPL-SCNC: 101 MMOL/L — SIGNIFICANT CHANGE UP (ref 98–107)
CO2 SERPL-SCNC: 29 MMOL/L — SIGNIFICANT CHANGE UP (ref 22–31)
CREAT SERPL-MCNC: 0.82 MG/DL — SIGNIFICANT CHANGE UP (ref 0.5–1.3)
EOSINOPHIL # BLD AUTO: 0.03 K/UL — SIGNIFICANT CHANGE UP (ref 0–0.5)
EOSINOPHIL NFR BLD AUTO: 0.3 % — SIGNIFICANT CHANGE UP (ref 0–6)
GLUCOSE BLDA-MCNC: 157 MG/DL — HIGH (ref 70–99)
GLUCOSE SERPL-MCNC: 166 MG/DL — HIGH (ref 70–99)
HCO3 BLDA-SCNC: 31 MMOL/L — HIGH (ref 22–26)
HCT VFR BLD CALC: 34.2 % — LOW (ref 34.5–45)
HCT VFR BLDA CALC: 36.8 % — SIGNIFICANT CHANGE UP (ref 34.5–46.5)
HGB BLD-MCNC: 10.9 G/DL — LOW (ref 11.5–15.5)
HGB BLDA-MCNC: 12 G/DL — SIGNIFICANT CHANGE UP (ref 11.5–15.5)
HIV 1+2 AB+HIV1 P24 AG SERPL QL IA: SIGNIFICANT CHANGE UP
IMM GRANULOCYTES # BLD AUTO: 0.03 # — SIGNIFICANT CHANGE UP
IMM GRANULOCYTES NFR BLD AUTO: 0.3 % — SIGNIFICANT CHANGE UP (ref 0–1.5)
LYMPHOCYTES # BLD AUTO: 1.64 K/UL — SIGNIFICANT CHANGE UP (ref 1–3.3)
LYMPHOCYTES # BLD AUTO: 16.5 % — SIGNIFICANT CHANGE UP (ref 13–44)
MAGNESIUM SERPL-MCNC: 2.7 MG/DL — HIGH (ref 1.6–2.6)
MCHC RBC-ENTMCNC: 29.2 PG — SIGNIFICANT CHANGE UP (ref 27–34)
MCHC RBC-ENTMCNC: 31.9 % — LOW (ref 32–36)
MCV RBC AUTO: 91.7 FL — SIGNIFICANT CHANGE UP (ref 80–100)
MONOCYTES # BLD AUTO: 0.59 K/UL — SIGNIFICANT CHANGE UP (ref 0–0.9)
MONOCYTES NFR BLD AUTO: 6 % — SIGNIFICANT CHANGE UP (ref 2–14)
NEUTROPHILS # BLD AUTO: 7.58 K/UL — HIGH (ref 1.8–7.4)
NEUTROPHILS NFR BLD AUTO: 76.5 % — SIGNIFICANT CHANGE UP (ref 43–77)
NRBC # FLD: 0.05 — SIGNIFICANT CHANGE UP
PCO2 BLDA: 53 MMHG — HIGH (ref 32–48)
PH BLDA: 7.4 PH — SIGNIFICANT CHANGE UP (ref 7.35–7.45)
PHOSPHATE SERPL-MCNC: 3.5 MG/DL — SIGNIFICANT CHANGE UP (ref 2.5–4.5)
PLATELET # BLD AUTO: 302 K/UL — SIGNIFICANT CHANGE UP (ref 150–400)
PMV BLD: 9.9 FL — SIGNIFICANT CHANGE UP (ref 7–13)
PO2 BLDA: 193 MMHG — HIGH (ref 83–108)
POTASSIUM BLDA-SCNC: 3.3 MMOL/L — LOW (ref 3.4–4.5)
POTASSIUM SERPL-MCNC: 3.8 MMOL/L — SIGNIFICANT CHANGE UP (ref 3.5–5.3)
POTASSIUM SERPL-SCNC: 3.8 MMOL/L — SIGNIFICANT CHANGE UP (ref 3.5–5.3)
PROT SERPL-MCNC: 5.8 G/DL — LOW (ref 6–8.3)
RBC # BLD: 3.73 M/UL — LOW (ref 3.8–5.2)
RBC # FLD: 14.6 % — HIGH (ref 10.3–14.5)
SAO2 % BLDA: 99.5 % — HIGH (ref 95–99)
SODIUM BLDA-SCNC: 141 MMOL/L — SIGNIFICANT CHANGE UP (ref 136–146)
SODIUM SERPL-SCNC: 143 MMOL/L — SIGNIFICANT CHANGE UP (ref 135–145)
VANCOMYCIN TROUGH SERPL-MCNC: 14.1 UG/ML — SIGNIFICANT CHANGE UP (ref 10–20)
WBC # BLD: 9.91 K/UL — SIGNIFICANT CHANGE UP (ref 3.8–10.5)
WBC # FLD AUTO: 9.91 K/UL — SIGNIFICANT CHANGE UP (ref 3.8–10.5)

## 2018-08-29 PROCEDURE — 93281 PM DEVICE PROGR EVAL MULTI: CPT | Mod: 26

## 2018-08-29 PROCEDURE — 31500 INSERT EMERGENCY AIRWAY: CPT

## 2018-08-29 PROCEDURE — 71045 X-RAY EXAM CHEST 1 VIEW: CPT | Mod: 26

## 2018-08-29 PROCEDURE — 99291 CRITICAL CARE FIRST HOUR: CPT | Mod: 25

## 2018-08-29 RX ORDER — CHLORHEXIDINE GLUCONATE 213 G/1000ML
15 SOLUTION TOPICAL EVERY 12 HOURS
Qty: 0 | Refills: 0 | Status: DISCONTINUED | OUTPATIENT
Start: 2018-08-29 | End: 2018-09-04

## 2018-08-29 RX ORDER — MIDAZOLAM HYDROCHLORIDE 1 MG/ML
4 INJECTION, SOLUTION INTRAMUSCULAR; INTRAVENOUS ONCE
Qty: 0 | Refills: 0 | Status: DISCONTINUED | OUTPATIENT
Start: 2018-08-29 | End: 2018-08-29

## 2018-08-29 RX ORDER — MIDAZOLAM HYDROCHLORIDE 1 MG/ML
3 INJECTION, SOLUTION INTRAMUSCULAR; INTRAVENOUS ONCE
Qty: 0 | Refills: 0 | Status: DISCONTINUED | OUTPATIENT
Start: 2018-08-29 | End: 2018-08-29

## 2018-08-29 RX ADMIN — Medication 1 PUFF(S): at 15:44

## 2018-08-29 RX ADMIN — MIDAZOLAM HYDROCHLORIDE 4 MILLIGRAM(S): 1 INJECTION, SOLUTION INTRAMUSCULAR; INTRAVENOUS at 12:04

## 2018-08-29 RX ADMIN — Medication 1 PUFF(S): at 21:30

## 2018-08-29 RX ADMIN — ALBUTEROL 2 PUFF(S): 90 AEROSOL, METERED ORAL at 15:44

## 2018-08-29 RX ADMIN — Medication 1 PUFF(S): at 01:10

## 2018-08-29 RX ADMIN — Medication 1 DROP(S): at 06:40

## 2018-08-29 RX ADMIN — Medication 250 MILLIGRAM(S): at 11:05

## 2018-08-29 RX ADMIN — ENOXAPARIN SODIUM 40 MILLIGRAM(S): 100 INJECTION SUBCUTANEOUS at 09:30

## 2018-08-29 RX ADMIN — ALBUTEROL 2 PUFF(S): 90 AEROSOL, METERED ORAL at 07:55

## 2018-08-29 RX ADMIN — SODIUM CHLORIDE 4 MILLILITER(S): 9 INJECTION INTRAMUSCULAR; INTRAVENOUS; SUBCUTANEOUS at 22:00

## 2018-08-29 RX ADMIN — SODIUM CHLORIDE 4 MILLILITER(S): 9 INJECTION INTRAMUSCULAR; INTRAVENOUS; SUBCUTANEOUS at 09:30

## 2018-08-29 RX ADMIN — ALBUTEROL 2 PUFF(S): 90 AEROSOL, METERED ORAL at 01:10

## 2018-08-29 RX ADMIN — CHLORHEXIDINE GLUCONATE 1 APPLICATION(S): 213 SOLUTION TOPICAL at 06:40

## 2018-08-29 RX ADMIN — FENTANYL CITRATE 7.04 MICROGRAM(S)/KG/HR: 50 INJECTION INTRAVENOUS at 19:50

## 2018-08-29 RX ADMIN — PIPERACILLIN AND TAZOBACTAM 25 GRAM(S): 4; .5 INJECTION, POWDER, LYOPHILIZED, FOR SOLUTION INTRAVENOUS at 19:50

## 2018-08-29 RX ADMIN — CHLORHEXIDINE GLUCONATE 15 MILLILITER(S): 213 SOLUTION TOPICAL at 06:40

## 2018-08-29 RX ADMIN — Medication 1 DROP(S): at 17:43

## 2018-08-29 RX ADMIN — Medication 100 MILLIGRAM(S): at 17:43

## 2018-08-29 RX ADMIN — PROPOFOL 4.2 MICROGRAM(S)/KG/MIN: 10 INJECTION, EMULSION INTRAVENOUS at 19:50

## 2018-08-29 RX ADMIN — Medication 250 MILLIGRAM(S): at 23:58

## 2018-08-29 RX ADMIN — PIPERACILLIN AND TAZOBACTAM 25 GRAM(S): 4; .5 INJECTION, POWDER, LYOPHILIZED, FOR SOLUTION INTRAVENOUS at 04:36

## 2018-08-29 RX ADMIN — ALBUTEROL 2 PUFF(S): 90 AEROSOL, METERED ORAL at 21:30

## 2018-08-29 RX ADMIN — MIDAZOLAM HYDROCHLORIDE 3 MILLIGRAM(S): 1 INJECTION, SOLUTION INTRAMUSCULAR; INTRAVENOUS at 12:06

## 2018-08-29 RX ADMIN — PIPERACILLIN AND TAZOBACTAM 25 GRAM(S): 4; .5 INJECTION, POWDER, LYOPHILIZED, FOR SOLUTION INTRAVENOUS at 12:52

## 2018-08-29 RX ADMIN — Medication 1 PUFF(S): at 07:55

## 2018-08-29 RX ADMIN — FENTANYL CITRATE 7.04 MICROGRAM(S)/KG/HR: 50 INJECTION INTRAVENOUS at 07:42

## 2018-08-29 RX ADMIN — Medication 100 MILLIGRAM(S): at 06:39

## 2018-08-29 RX ADMIN — PROPOFOL 4.2 MICROGRAM(S)/KG/MIN: 10 INJECTION, EMULSION INTRAVENOUS at 07:43

## 2018-08-29 RX ADMIN — Medication 250 MILLIGRAM(S): at 00:32

## 2018-08-29 RX ADMIN — CHLORHEXIDINE GLUCONATE 15 MILLILITER(S): 213 SOLUTION TOPICAL at 17:42

## 2018-08-29 RX ADMIN — SODIUM CHLORIDE 4 MILLILITER(S): 9 INJECTION INTRAMUSCULAR; INTRAVENOUS; SUBCUTANEOUS at 01:15

## 2018-08-29 NOTE — PROGRESS NOTE ADULT - ASSESSMENT
61 y/o F w/ a pmh significant for Sarcoidosis, TIA and PPM who initially presented to the ED w/ a chief complaint of SOB and AMS- intubated and admitted to MICU due to hypoxic/hypercapnic respiratory failure in setting of pulmonary edema and basilar PNA      #Neuro  - Intubated, sedated  - CT Head negative for acute bleed  - Required IVP Fentanyl and Versed over night 2/2 agitation      #Resp  - Hypoxic/hypercapnic respiratory failure of unclear etiology as POCUS shows normal LV systolic function  - POCUS shows bibasilar consolidations  - c/w Hydrocortisone 100mg Q12H  - Plan for SBT this AM. May require rapid extubation as pt becomes uncomfortable/agitated while on SBT    #Cardio   - Off vasopressor support  - Hemodynamically stable at this time    #ID  - Mild increase in Leukocytosis 10.9 in setting of Hydrocortisone vs. ?bibasilar consolidations on admission CXR (likely fluid overload)   - c/w abx (5/5)    #Metabolic  - Monitor Cr, urine output     FEN/GI  - NPO on tube feeds    #Prophylaxis   - Venodynes for DVT prophylaxis

## 2018-08-29 NOTE — PROCEDURE NOTE - INTERROGATION NOTE: COMMENTS
PPM mode with AV search feature ON which resulted intermittent searching for intrinsic rhythm.  Noted under-pacing.  Dual chamber ICD with normal sensing.  Reprogrammed "AV search" "OFF" as patient has CHB. Also extended PVARP to avoid pacemaker mediated tachycardia. No VT/VF or PAF recorded.

## 2018-08-29 NOTE — PROGRESS NOTE ADULT - SUBJECTIVE AND OBJECTIVE BOX
INTERVAL HPI/OVERNIGHT EVENTS: No acute events over night.     SUBJECTIVE: Patient seen and examined at bedside.     ROS unable to be obtained.    OBJECTIVE:    VITAL SIGNS:  ICU Vital Signs Last 24 Hrs  T(C): 36.8 (29 Aug 2018 04:00), Max: 36.8 (29 Aug 2018 04:00)  T(F): 98.3 (29 Aug 2018 04:00), Max: 98.3 (29 Aug 2018 04:00)  HR: 85 (29 Aug 2018 07:00) (62 - 111)  BP: 109/61 (29 Aug 2018 07:00) (80/48 - 166/83)  BP(mean): 73 (29 Aug 2018 07:00) (55 - 105)  ABP: --  ABP(mean): --  RR: 20 (29 Aug 2018 07:00) (18 - 39)  SpO2: 95% (29 Aug 2018 07:00) (59% - 100%)    Mode: AC/ CMV (Assist Control/ Continuous Mandatory Ventilation), RR (machine): 20, TV (machine): 380, FiO2: 60, PEEP: 5, MAP: 10.2, PIP: 27    08-28 @ 07:01  -  08-29 @ 07:00  --------------------------------------------------------  IN: 1667 mL / OUT: 1570 mL / NET: 97 mL      CAPILLARY BLOOD GLUCOSE      POCT Blood Glucose.: 137 mg/dL (29 Aug 2018 06:35)      General: sedated  HEENT: NC/AT; PERRL, clear conjunctiva  Neck: supple  Respiratory: Coarse breath sounds in anterior lung fields bilaterally  Cardiovascular: +S1/S2; RRR  Abdomen: soft, NT/ND; +BS x4  Extremities: WWP, 2+ peripheral pulses b/l; no LE edema  Skin: normal color and turgor; no rash  Neurological: intubated, sedated      MEDICATIONS:  MEDICATIONS  (STANDING):  ALBUTerol    90 MICROgram(s) HFA Inhaler 2 Puff(s) Inhalation every 6 hours  artificial tears (preservative free) Ophthalmic Solution 1 Drop(s) Both EYES every 12 hours  chlorhexidine 0.12% Liquid 15 milliLiter(s) Swish and Spit every 12 hours  chlorhexidine 4% Liquid 1 Application(s) Topical <User Schedule>  enoxaparin Injectable 40 milliGRAM(s) SubCutaneous every 24 hours  fentaNYL   Infusion. 1 MICROgram(s)/kG/Hr (7.04 mL/Hr) IV Continuous <Continuous>  hydrocortisone sodium succinate Injectable 100 milliGRAM(s) IV Push every 12 hours  ipratropium 17 MICROgram(s) HFA Inhaler 1 Puff(s) Inhalation every 6 hours  piperacillin/tazobactam IVPB. 3.375 Gram(s) IV Intermittent every 8 hours  propofol Infusion 9.943 MICROgram(s)/kG/Min (4.2 mL/Hr) IV Continuous <Continuous>  sodium chloride 3%  Inhalation 4 milliLiter(s) Inhalation two times a day  vancomycin  IVPB      vancomycin  IVPB 1000 milliGRAM(s) IV Intermittent every 12 hours    MEDICATIONS  (PRN):      ALLERGIES:  Allergies    No Known Allergies    Intolerances        LABS:                        10.9   9.91  )-----------( 302      ( 29 Aug 2018 02:55 )             34.2     08-29    143  |  101  |  19  ----------------------------<  166<H>  3.8   |  29  |  0.82    Ca    8.6      29 Aug 2018 02:55  Phos  3.5     08-29  Mg     2.7     08-29    TPro  5.8<L>  /  Alb  2.7<L>  /  TBili  0.4  /  DBili  x   /  AST  50<H>  /  ALT  46<H>  /  AlkPhos  63  08-29          RADIOLOGY & ADDITIONAL TESTS: Reviewed.

## 2018-08-30 LAB
ALBUMIN SERPL ELPH-MCNC: 2.8 G/DL — LOW (ref 3.3–5)
ALP SERPL-CCNC: 61 U/L — SIGNIFICANT CHANGE UP (ref 40–120)
ALT FLD-CCNC: 49 U/L — HIGH (ref 4–33)
APTT BLD: 26.7 SEC — LOW (ref 27.5–37.4)
AST SERPL-CCNC: 42 U/L — HIGH (ref 4–32)
BACTERIA BLD CULT: SIGNIFICANT CHANGE UP
BACTERIA BLD CULT: SIGNIFICANT CHANGE UP
BASOPHILS # BLD AUTO: 0.04 K/UL — SIGNIFICANT CHANGE UP (ref 0–0.2)
BASOPHILS NFR BLD AUTO: 0.3 % — SIGNIFICANT CHANGE UP (ref 0–2)
BILIRUB SERPL-MCNC: 0.5 MG/DL — SIGNIFICANT CHANGE UP (ref 0.2–1.2)
BUN SERPL-MCNC: 22 MG/DL — SIGNIFICANT CHANGE UP (ref 7–23)
CALCIUM SERPL-MCNC: 8.4 MG/DL — SIGNIFICANT CHANGE UP (ref 8.4–10.5)
CHLORIDE SERPL-SCNC: 101 MMOL/L — SIGNIFICANT CHANGE UP (ref 98–107)
CO2 SERPL-SCNC: 30 MMOL/L — SIGNIFICANT CHANGE UP (ref 22–31)
CREAT SERPL-MCNC: 0.72 MG/DL — SIGNIFICANT CHANGE UP (ref 0.5–1.3)
EOSINOPHIL # BLD AUTO: 0.14 K/UL — SIGNIFICANT CHANGE UP (ref 0–0.5)
EOSINOPHIL NFR BLD AUTO: 1.1 % — SIGNIFICANT CHANGE UP (ref 0–6)
GLUCOSE SERPL-MCNC: 137 MG/DL — HIGH (ref 70–99)
HCT VFR BLD CALC: 33.6 % — LOW (ref 34.5–45)
HCV RNA SERPL NAA DL=5-ACNC: NOT DETECTED IU/ML — SIGNIFICANT CHANGE UP
HCV RNA SPEC NAA+PROBE-LOG IU: SIGNIFICANT CHANGE UP LOGIU/ML
HGB BLD-MCNC: 10.6 G/DL — LOW (ref 11.5–15.5)
IMM GRANULOCYTES # BLD AUTO: 0.05 # — SIGNIFICANT CHANGE UP
IMM GRANULOCYTES NFR BLD AUTO: 0.4 % — SIGNIFICANT CHANGE UP (ref 0–1.5)
INR BLD: 0.97 — SIGNIFICANT CHANGE UP (ref 0.88–1.17)
LYMPHOCYTES # BLD AUTO: 2.49 K/UL — SIGNIFICANT CHANGE UP (ref 1–3.3)
LYMPHOCYTES # BLD AUTO: 20.2 % — SIGNIFICANT CHANGE UP (ref 13–44)
MAGNESIUM SERPL-MCNC: 2.8 MG/DL — HIGH (ref 1.6–2.6)
MCHC RBC-ENTMCNC: 29.3 PG — SIGNIFICANT CHANGE UP (ref 27–34)
MCHC RBC-ENTMCNC: 31.5 % — LOW (ref 32–36)
MCV RBC AUTO: 92.8 FL — SIGNIFICANT CHANGE UP (ref 80–100)
MONOCYTES # BLD AUTO: 0.93 K/UL — HIGH (ref 0–0.9)
MONOCYTES NFR BLD AUTO: 7.5 % — SIGNIFICANT CHANGE UP (ref 2–14)
NEUTROPHILS # BLD AUTO: 8.69 K/UL — HIGH (ref 1.8–7.4)
NEUTROPHILS NFR BLD AUTO: 70.5 % — SIGNIFICANT CHANGE UP (ref 43–77)
NRBC # FLD: 0.05 — SIGNIFICANT CHANGE UP
PHOSPHATE SERPL-MCNC: 2.5 MG/DL — SIGNIFICANT CHANGE UP (ref 2.5–4.5)
PLATELET # BLD AUTO: 318 K/UL — SIGNIFICANT CHANGE UP (ref 150–400)
PMV BLD: 9.8 FL — SIGNIFICANT CHANGE UP (ref 7–13)
POTASSIUM SERPL-MCNC: 3.4 MMOL/L — LOW (ref 3.5–5.3)
POTASSIUM SERPL-SCNC: 3.4 MMOL/L — LOW (ref 3.5–5.3)
PROT SERPL-MCNC: 6.2 G/DL — SIGNIFICANT CHANGE UP (ref 6–8.3)
PROTHROM AB SERPL-ACNC: 11.2 SEC — SIGNIFICANT CHANGE UP (ref 9.8–13.1)
RBC # BLD: 3.62 M/UL — LOW (ref 3.8–5.2)
RBC # FLD: 14.7 % — HIGH (ref 10.3–14.5)
SODIUM SERPL-SCNC: 143 MMOL/L — SIGNIFICANT CHANGE UP (ref 135–145)
WBC # BLD: 12.34 K/UL — HIGH (ref 3.8–10.5)
WBC # FLD AUTO: 12.34 K/UL — HIGH (ref 3.8–10.5)

## 2018-08-30 PROCEDURE — 76604 US EXAM CHEST: CPT | Mod: 26,GC

## 2018-08-30 PROCEDURE — 99291 CRITICAL CARE FIRST HOUR: CPT | Mod: 25

## 2018-08-30 RX ORDER — FENTANYL CITRATE 50 UG/ML
1 INJECTION INTRAVENOUS
Qty: 2500 | Refills: 0 | Status: DISCONTINUED | OUTPATIENT
Start: 2018-08-30 | End: 2018-08-31

## 2018-08-30 RX ORDER — METOPROLOL TARTRATE 50 MG
10 TABLET ORAL ONCE
Qty: 0 | Refills: 0 | Status: DISCONTINUED | OUTPATIENT
Start: 2018-08-30 | End: 2018-08-30

## 2018-08-30 RX ORDER — DEXMEDETOMIDINE HYDROCHLORIDE IN 0.9% SODIUM CHLORIDE 4 UG/ML
0.2 INJECTION INTRAVENOUS
Qty: 200 | Refills: 0 | Status: DISCONTINUED | OUTPATIENT
Start: 2018-08-30 | End: 2018-09-01

## 2018-08-30 RX ORDER — PROPOFOL 10 MG/ML
9.94 INJECTION, EMULSION INTRAVENOUS
Qty: 1000 | Refills: 0 | Status: DISCONTINUED | OUTPATIENT
Start: 2018-08-30 | End: 2018-08-31

## 2018-08-30 RX ORDER — FUROSEMIDE 40 MG
40 TABLET ORAL ONCE
Qty: 0 | Refills: 0 | Status: DISCONTINUED | OUTPATIENT
Start: 2018-08-30 | End: 2018-08-30

## 2018-08-30 RX ORDER — METOPROLOL TARTRATE 50 MG
5 TABLET ORAL ONCE
Qty: 0 | Refills: 0 | Status: COMPLETED | OUTPATIENT
Start: 2018-08-30 | End: 2018-08-30

## 2018-08-30 RX ORDER — FUROSEMIDE 40 MG
40 TABLET ORAL ONCE
Qty: 0 | Refills: 0 | Status: COMPLETED | OUTPATIENT
Start: 2018-08-30 | End: 2018-08-30

## 2018-08-30 RX ORDER — POTASSIUM CHLORIDE 20 MEQ
40 PACKET (EA) ORAL ONCE
Qty: 0 | Refills: 0 | Status: COMPLETED | OUTPATIENT
Start: 2018-08-30 | End: 2018-08-30

## 2018-08-30 RX ADMIN — ALBUTEROL 2 PUFF(S): 90 AEROSOL, METERED ORAL at 21:30

## 2018-08-30 RX ADMIN — PIPERACILLIN AND TAZOBACTAM 25 GRAM(S): 4; .5 INJECTION, POWDER, LYOPHILIZED, FOR SOLUTION INTRAVENOUS at 11:50

## 2018-08-30 RX ADMIN — ALBUTEROL 2 PUFF(S): 90 AEROSOL, METERED ORAL at 09:48

## 2018-08-30 RX ADMIN — Medication 5 MILLIGRAM(S): at 13:20

## 2018-08-30 RX ADMIN — Medication 40 MILLIEQUIVALENT(S): at 06:09

## 2018-08-30 RX ADMIN — DEXMEDETOMIDINE HYDROCHLORIDE IN 0.9% SODIUM CHLORIDE 3.52 MICROGRAM(S)/KG/HR: 4 INJECTION INTRAVENOUS at 12:12

## 2018-08-30 RX ADMIN — CHLORHEXIDINE GLUCONATE 15 MILLILITER(S): 213 SOLUTION TOPICAL at 18:38

## 2018-08-30 RX ADMIN — FENTANYL CITRATE 7.04 MICROGRAM(S)/KG/HR: 50 INJECTION INTRAVENOUS at 19:39

## 2018-08-30 RX ADMIN — CHLORHEXIDINE GLUCONATE 15 MILLILITER(S): 213 SOLUTION TOPICAL at 05:03

## 2018-08-30 RX ADMIN — Medication 1 PUFF(S): at 04:05

## 2018-08-30 RX ADMIN — Medication 100 MILLIGRAM(S): at 05:03

## 2018-08-30 RX ADMIN — Medication 1 PUFF(S): at 15:22

## 2018-08-30 RX ADMIN — Medication 100 MILLIGRAM(S): at 18:37

## 2018-08-30 RX ADMIN — ALBUTEROL 2 PUFF(S): 90 AEROSOL, METERED ORAL at 04:05

## 2018-08-30 RX ADMIN — Medication 1 PUFF(S): at 21:30

## 2018-08-30 RX ADMIN — Medication 40 MILLIGRAM(S): at 11:50

## 2018-08-30 RX ADMIN — SODIUM CHLORIDE 4 MILLILITER(S): 9 INJECTION INTRAMUSCULAR; INTRAVENOUS; SUBCUTANEOUS at 09:47

## 2018-08-30 RX ADMIN — CHLORHEXIDINE GLUCONATE 1 APPLICATION(S): 213 SOLUTION TOPICAL at 11:50

## 2018-08-30 RX ADMIN — PIPERACILLIN AND TAZOBACTAM 25 GRAM(S): 4; .5 INJECTION, POWDER, LYOPHILIZED, FOR SOLUTION INTRAVENOUS at 05:03

## 2018-08-30 RX ADMIN — PIPERACILLIN AND TAZOBACTAM 25 GRAM(S): 4; .5 INJECTION, POWDER, LYOPHILIZED, FOR SOLUTION INTRAVENOUS at 23:32

## 2018-08-30 RX ADMIN — Medication 1 PUFF(S): at 09:48

## 2018-08-30 RX ADMIN — ENOXAPARIN SODIUM 40 MILLIGRAM(S): 100 INJECTION SUBCUTANEOUS at 10:58

## 2018-08-30 RX ADMIN — Medication 1 DROP(S): at 18:37

## 2018-08-30 RX ADMIN — Medication 1 DROP(S): at 05:03

## 2018-08-30 RX ADMIN — ALBUTEROL 2 PUFF(S): 90 AEROSOL, METERED ORAL at 15:22

## 2018-08-30 NOTE — PROGRESS NOTE ADULT - ASSESSMENT
59 y/o F w/ a pmh significant for Sarcoidosis, TIA and PPM who initially presented to the ED w/ a chief complaint of SOB and AMS- intubated and admitted to MICU due to hypoxic/hypercapnic respiratory failure in setting of pulmonary edema and basilar PNA      #Neuro  - Intubated, sedated  - CT Head negative for acute bleed  - Required IVP Fentanyl and Versed over night 2/2 agitation      #Resp  - Hypoxic/hypercapnic respiratory failure of unclear etiology as POCUS shows normal LV systolic function  - POCUS shows bibasilar consolidations  - c/w Hydrocortisone 100mg Q12H  - Plan for SBT this AM. May require rapid extubation as pt becomes uncomfortable/agitated while on SBT    #Cardio   - Off vasopressor support  - Hemodynamically stable at this time    #ID  - Mild increase in Leukocytosis 10.9 in setting of Hydrocortisone vs. ?bibasilar consolidations on admission CXR (likely fluid overload)   - c/w abx (5/5)    #Metabolic  - Monitor Cr, urine output     FEN/GI  - NPO on tube feeds    #Prophylaxis   - Venodynes for DVT prophylaxis

## 2018-08-30 NOTE — CHART NOTE - NSCHARTNOTEFT_GEN_A_CORE
: Mary Urbina    INDICATION: respiratory failure    PROCEDURE:  [ ] LIMITED ECHO  [ x] LIMITED CHEST  [ ] LIMITED RETROPERITONEAL  [ ] LIMITED ABDOMINAL  [ ] LIMITED DVT  [ ] NEEDLE GUIDANCE VASCULAR  [ ] NEEDLE GUIDANCE THORACENTESIS  [ ] NEEDLE GUIDANCE PARACENTESIS  [ ] NEEDLE GUIDANCE PERICARDIOCENTESIS  [ ] OTHER    FINDINGS:  Chest- scattered B lines anteriorly and posteriorly    INTERPRETATION:  Will give lasix prior to extubation    Sophia Urbina MD  Pulmonary Critical Care Fellow  [phone]

## 2018-08-30 NOTE — PROGRESS NOTE ADULT - SUBJECTIVE AND OBJECTIVE BOX
INTERVAL HPI/OVERNIGHT EVENTS:    SUBJECTIVE: Patient seen and examined at bedside. No acute events over night.       OBJECTIVE:    VITAL SIGNS:  ICU Vital Signs Last 24 Hrs  T(C): 37.1 (30 Aug 2018 04:00), Max: 37.1 (30 Aug 2018 04:00)  T(F): 98.8 (30 Aug 2018 04:00), Max: 98.8 (30 Aug 2018 04:00)  HR: 82 (30 Aug 2018 06:00) (73 - 135)  BP: 137/65 (30 Aug 2018 06:00) (82/54 - 171/82)  BP(mean): 84 (30 Aug 2018 06:00) (61 - 114)  ABP: --  ABP(mean): --  RR: 20 (30 Aug 2018 06:00) (13 - 29)  SpO2: 99% (30 Aug 2018 06:00) (92% - 100%)    Mode: AC/ CMV (Assist Control/ Continuous Mandatory Ventilation), RR (machine): 20, TV (machine): 380, FiO2: 60, PEEP: 5, MAP: 10.7, PIP: 32    08-29 @ 07:01  -  08-30 @ 07:00  --------------------------------------------------------  IN: 2154.1 mL / OUT: 785 mL / NET: 1369.1 mL      CAPILLARY BLOOD GLUCOSE      POCT Blood Glucose.: 122 mg/dL (30 Aug 2018 04:55)      PHYSICAL EXAM:    General: NAD  HEENT: NC/AT; PERRL, clear conjunctiva  Neck: supple  Respiratory: CTA b/l  Cardiovascular: +S1/S2; RRR  Abdomen: soft, NT/ND; +BS x4  Extremities: WWP, 2+ peripheral pulses b/l; no LE edema  Skin: normal color and turgor; no rash  Neurological: intubated sedated    MEDICATIONS:  MEDICATIONS  (STANDING):  ALBUTerol    90 MICROgram(s) HFA Inhaler 2 Puff(s) Inhalation every 6 hours  artificial tears (preservative free) Ophthalmic Solution 1 Drop(s) Both EYES every 12 hours  chlorhexidine 0.12% Liquid 15 milliLiter(s) Swish and Spit every 12 hours  chlorhexidine 4% Liquid 1 Application(s) Topical <User Schedule>  enoxaparin Injectable 40 milliGRAM(s) SubCutaneous every 24 hours  fentaNYL   Infusion. 1 MICROgram(s)/kG/Hr (7.04 mL/Hr) IV Continuous <Continuous>  hydrocortisone sodium succinate Injectable 100 milliGRAM(s) IV Push every 12 hours  ipratropium 17 MICROgram(s) HFA Inhaler 1 Puff(s) Inhalation every 6 hours  piperacillin/tazobactam IVPB. 3.375 Gram(s) IV Intermittent every 8 hours  propofol Infusion 9.943 MICROgram(s)/kG/Min (4.2 mL/Hr) IV Continuous <Continuous>  sodium chloride 3%  Inhalation 4 milliLiter(s) Inhalation two times a day  vancomycin  IVPB      vancomycin  IVPB 1000 milliGRAM(s) IV Intermittent every 12 hours    MEDICATIONS  (PRN):      ALLERGIES:  Allergies    No Known Allergies    Intolerances        LABS:                        10.6   12.34 )-----------( 318      ( 30 Aug 2018 03:00 )             33.6     08-30    143  |  101  |  22  ----------------------------<  137<H>  3.4<L>   |  30  |  0.72    Ca    8.4      30 Aug 2018 03:00  Phos  2.5     08-30  Mg     2.8     08-30    TPro  6.2  /  Alb  2.8<L>  /  TBili  0.5  /  DBili  x   /  AST  42<H>  /  ALT  49<H>  /  AlkPhos  61  08-30    PT/INR - ( 30 Aug 2018 03:00 )   PT: 11.2 SEC;   INR: 0.97          PTT - ( 30 Aug 2018 03:00 )  PTT:26.7 SEC      RADIOLOGY & ADDITIONAL TESTS: Reviewed.

## 2018-08-31 LAB
ALBUMIN SERPL ELPH-MCNC: 2.8 G/DL — LOW (ref 3.3–5)
ALP SERPL-CCNC: 62 U/L — SIGNIFICANT CHANGE UP (ref 40–120)
ALT FLD-CCNC: 64 U/L — HIGH (ref 4–33)
APPEARANCE UR: SIGNIFICANT CHANGE UP
AST SERPL-CCNC: 54 U/L — HIGH (ref 4–32)
BACTERIA # UR AUTO: SIGNIFICANT CHANGE UP
BASOPHILS # BLD AUTO: 0.03 K/UL — SIGNIFICANT CHANGE UP (ref 0–0.2)
BASOPHILS NFR BLD AUTO: 0.3 % — SIGNIFICANT CHANGE UP (ref 0–2)
BILIRUB SERPL-MCNC: 0.7 MG/DL — SIGNIFICANT CHANGE UP (ref 0.2–1.2)
BILIRUB UR-MCNC: NEGATIVE — SIGNIFICANT CHANGE UP
BLOOD UR QL VISUAL: SIGNIFICANT CHANGE UP
BUN SERPL-MCNC: 27 MG/DL — HIGH (ref 7–23)
CALCIUM SERPL-MCNC: 8.8 MG/DL — SIGNIFICANT CHANGE UP (ref 8.4–10.5)
CHLORIDE SERPL-SCNC: 105 MMOL/L — SIGNIFICANT CHANGE UP (ref 98–107)
CO2 SERPL-SCNC: 29 MMOL/L — SIGNIFICANT CHANGE UP (ref 22–31)
COLOR SPEC: SIGNIFICANT CHANGE UP
CREAT SERPL-MCNC: 0.74 MG/DL — SIGNIFICANT CHANGE UP (ref 0.5–1.3)
EOSINOPHIL # BLD AUTO: 0.19 K/UL — SIGNIFICANT CHANGE UP (ref 0–0.5)
EOSINOPHIL NFR BLD AUTO: 1.8 % — SIGNIFICANT CHANGE UP (ref 0–6)
GLUCOSE SERPL-MCNC: 109 MG/DL — HIGH (ref 70–99)
GLUCOSE UR-MCNC: NEGATIVE — SIGNIFICANT CHANGE UP
HCT VFR BLD CALC: 30.2 % — LOW (ref 34.5–45)
HGB BLD-MCNC: 9.8 G/DL — LOW (ref 11.5–15.5)
IMM GRANULOCYTES # BLD AUTO: 0.05 # — SIGNIFICANT CHANGE UP
IMM GRANULOCYTES NFR BLD AUTO: 0.5 % — SIGNIFICANT CHANGE UP (ref 0–1.5)
KETONES UR-MCNC: NEGATIVE — SIGNIFICANT CHANGE UP
LEUKOCYTE ESTERASE UR-ACNC: SIGNIFICANT CHANGE UP
LYMPHOCYTES # BLD AUTO: 2.21 K/UL — SIGNIFICANT CHANGE UP (ref 1–3.3)
LYMPHOCYTES # BLD AUTO: 20.7 % — SIGNIFICANT CHANGE UP (ref 13–44)
MAGNESIUM SERPL-MCNC: 2.7 MG/DL — HIGH (ref 1.6–2.6)
MCHC RBC-ENTMCNC: 30.7 PG — SIGNIFICANT CHANGE UP (ref 27–34)
MCHC RBC-ENTMCNC: 32.5 % — SIGNIFICANT CHANGE UP (ref 32–36)
MCV RBC AUTO: 94.7 FL — SIGNIFICANT CHANGE UP (ref 80–100)
MONOCYTES # BLD AUTO: 0.55 K/UL — SIGNIFICANT CHANGE UP (ref 0–0.9)
MONOCYTES NFR BLD AUTO: 5.2 % — SIGNIFICANT CHANGE UP (ref 2–14)
NEUTROPHILS # BLD AUTO: 7.63 K/UL — HIGH (ref 1.8–7.4)
NEUTROPHILS NFR BLD AUTO: 71.5 % — SIGNIFICANT CHANGE UP (ref 43–77)
NITRITE UR-MCNC: NEGATIVE — SIGNIFICANT CHANGE UP
NRBC # FLD: 0 — SIGNIFICANT CHANGE UP
PH UR: 5 — SIGNIFICANT CHANGE UP (ref 5–8)
PHOSPHATE SERPL-MCNC: 2.8 MG/DL — SIGNIFICANT CHANGE UP (ref 2.5–4.5)
PLATELET # BLD AUTO: 314 K/UL — SIGNIFICANT CHANGE UP (ref 150–400)
PMV BLD: 10.1 FL — SIGNIFICANT CHANGE UP (ref 7–13)
POTASSIUM SERPL-MCNC: 3.9 MMOL/L — SIGNIFICANT CHANGE UP (ref 3.5–5.3)
POTASSIUM SERPL-SCNC: 3.9 MMOL/L — SIGNIFICANT CHANGE UP (ref 3.5–5.3)
PROT SERPL-MCNC: 5.9 G/DL — LOW (ref 6–8.3)
PROT UR-MCNC: 100 — SIGNIFICANT CHANGE UP
RBC # BLD: 3.19 M/UL — LOW (ref 3.8–5.2)
RBC # FLD: 14.6 % — HIGH (ref 10.3–14.5)
RBC CASTS # UR COMP ASSIST: SIGNIFICANT CHANGE UP (ref 0–?)
SODIUM SERPL-SCNC: 145 MMOL/L — SIGNIFICANT CHANGE UP (ref 135–145)
SP GR SPEC: 1.03 — SIGNIFICANT CHANGE UP (ref 1–1.04)
SQUAMOUS # UR AUTO: SIGNIFICANT CHANGE UP
UROBILINOGEN FLD QL: 1 — SIGNIFICANT CHANGE UP
WBC # BLD: 10.66 K/UL — HIGH (ref 3.8–10.5)
WBC # FLD AUTO: 10.66 K/UL — HIGH (ref 3.8–10.5)
WBC UR QL: SIGNIFICANT CHANGE UP (ref 0–?)

## 2018-08-31 PROCEDURE — 99291 CRITICAL CARE FIRST HOUR: CPT

## 2018-08-31 RX ORDER — METOPROLOL TARTRATE 50 MG
25 TABLET ORAL ONCE
Qty: 0 | Refills: 0 | Status: COMPLETED | OUTPATIENT
Start: 2018-08-31 | End: 2018-08-31

## 2018-08-31 RX ORDER — LISINOPRIL 2.5 MG/1
5 TABLET ORAL DAILY
Qty: 0 | Refills: 0 | Status: DISCONTINUED | OUTPATIENT
Start: 2018-08-31 | End: 2018-09-07

## 2018-08-31 RX ORDER — ALPRAZOLAM 0.25 MG
0.25 TABLET ORAL
Qty: 0 | Refills: 0 | Status: DISCONTINUED | OUTPATIENT
Start: 2018-08-31 | End: 2018-09-01

## 2018-08-31 RX ORDER — ONDANSETRON 8 MG/1
4 TABLET, FILM COATED ORAL ONCE
Qty: 0 | Refills: 0 | Status: COMPLETED | OUTPATIENT
Start: 2018-08-31 | End: 2018-08-31

## 2018-08-31 RX ORDER — LISINOPRIL 2.5 MG/1
5 TABLET ORAL DAILY
Qty: 0 | Refills: 0 | Status: DISCONTINUED | OUTPATIENT
Start: 2018-08-31 | End: 2018-08-31

## 2018-08-31 RX ORDER — ONDANSETRON 8 MG/1
4 TABLET, FILM COATED ORAL EVERY 6 HOURS
Qty: 0 | Refills: 0 | Status: DISCONTINUED | OUTPATIENT
Start: 2018-08-31 | End: 2018-09-07

## 2018-08-31 RX ORDER — FUROSEMIDE 40 MG
20 TABLET ORAL ONCE
Qty: 0 | Refills: 0 | Status: COMPLETED | OUTPATIENT
Start: 2018-08-31 | End: 2018-08-31

## 2018-08-31 RX ADMIN — PIPERACILLIN AND TAZOBACTAM 25 GRAM(S): 4; .5 INJECTION, POWDER, LYOPHILIZED, FOR SOLUTION INTRAVENOUS at 16:36

## 2018-08-31 RX ADMIN — SODIUM CHLORIDE 4 MILLILITER(S): 9 INJECTION INTRAMUSCULAR; INTRAVENOUS; SUBCUTANEOUS at 22:10

## 2018-08-31 RX ADMIN — Medication 100 MILLIGRAM(S): at 17:08

## 2018-08-31 RX ADMIN — ALBUTEROL 2 PUFF(S): 90 AEROSOL, METERED ORAL at 15:53

## 2018-08-31 RX ADMIN — ALBUTEROL 2 PUFF(S): 90 AEROSOL, METERED ORAL at 09:27

## 2018-08-31 RX ADMIN — CHLORHEXIDINE GLUCONATE 1 APPLICATION(S): 213 SOLUTION TOPICAL at 12:20

## 2018-08-31 RX ADMIN — ONDANSETRON 4 MILLIGRAM(S): 8 TABLET, FILM COATED ORAL at 19:34

## 2018-08-31 RX ADMIN — DEXMEDETOMIDINE HYDROCHLORIDE IN 0.9% SODIUM CHLORIDE 3.52 MICROGRAM(S)/KG/HR: 4 INJECTION INTRAVENOUS at 05:43

## 2018-08-31 RX ADMIN — Medication 1 DROP(S): at 05:43

## 2018-08-31 RX ADMIN — PIPERACILLIN AND TAZOBACTAM 25 GRAM(S): 4; .5 INJECTION, POWDER, LYOPHILIZED, FOR SOLUTION INTRAVENOUS at 23:12

## 2018-08-31 RX ADMIN — Medication 25 MILLIGRAM(S): at 10:18

## 2018-08-31 RX ADMIN — Medication 0.25 MILLIGRAM(S): at 17:07

## 2018-08-31 RX ADMIN — Medication 1 PUFF(S): at 15:53

## 2018-08-31 RX ADMIN — ALBUTEROL 2 PUFF(S): 90 AEROSOL, METERED ORAL at 22:04

## 2018-08-31 RX ADMIN — Medication 1 PUFF(S): at 09:27

## 2018-08-31 RX ADMIN — DEXMEDETOMIDINE HYDROCHLORIDE IN 0.9% SODIUM CHLORIDE 3.52 MICROGRAM(S)/KG/HR: 4 INJECTION INTRAVENOUS at 07:54

## 2018-08-31 RX ADMIN — Medication 1 PUFF(S): at 22:04

## 2018-08-31 RX ADMIN — ENOXAPARIN SODIUM 40 MILLIGRAM(S): 100 INJECTION SUBCUTANEOUS at 10:07

## 2018-08-31 RX ADMIN — Medication 1 DROP(S): at 17:09

## 2018-08-31 RX ADMIN — CHLORHEXIDINE GLUCONATE 15 MILLILITER(S): 213 SOLUTION TOPICAL at 05:42

## 2018-08-31 RX ADMIN — ONDANSETRON 4 MILLIGRAM(S): 8 TABLET, FILM COATED ORAL at 12:33

## 2018-08-31 RX ADMIN — Medication 20 MILLIGRAM(S): at 10:18

## 2018-08-31 RX ADMIN — Medication 1 PUFF(S): at 03:05

## 2018-08-31 RX ADMIN — Medication 100 MILLIGRAM(S): at 05:43

## 2018-08-31 RX ADMIN — SODIUM CHLORIDE 4 MILLILITER(S): 9 INJECTION INTRAMUSCULAR; INTRAVENOUS; SUBCUTANEOUS at 08:56

## 2018-08-31 RX ADMIN — PIPERACILLIN AND TAZOBACTAM 25 GRAM(S): 4; .5 INJECTION, POWDER, LYOPHILIZED, FOR SOLUTION INTRAVENOUS at 07:54

## 2018-08-31 RX ADMIN — CHLORHEXIDINE GLUCONATE 15 MILLILITER(S): 213 SOLUTION TOPICAL at 17:08

## 2018-08-31 RX ADMIN — ALBUTEROL 2 PUFF(S): 90 AEROSOL, METERED ORAL at 03:05

## 2018-08-31 NOTE — PROGRESS NOTE ADULT - SUBJECTIVE AND OBJECTIVE BOX
INTERVAL HPI/OVERNIGHT EVENTS:    SUBJECTIVE: Patient seen and examined at bedside. No acute events over night.       OBJECTIVE:    VITAL SIGNS:  ICU Vital Signs Last 24 Hrs  T(C): 36.6 (31 Aug 2018 04:00), Max: 37.1 (30 Aug 2018 12:00)  T(F): 97.9 (31 Aug 2018 04:00), Max: 98.8 (30 Aug 2018 12:00)  HR: 89 (31 Aug 2018 06:46) (60 - 147)  BP: 154/79 (31 Aug 2018 06:15) (76/47 - 174/98)  BP(mean): 98 (31 Aug 2018 06:15) (49 - 118)  ABP: --  ABP(mean): --  RR: 21 (31 Aug 2018 06:15) (12 - 38)  SpO2: 100% (31 Aug 2018 06:46) (88% - 100%)    Mode: AC/ CMV (Assist Control/ Continuous Mandatory Ventilation), RR (machine): 20, TV (machine): 380, FiO2: 60, PEEP: 5, MAP: 10, PIP: 26    08-30 @ 07:01  -  08-31 @ 07:00  --------------------------------------------------------  IN: 473.6 mL / OUT: 2085 mL / NET: -1611.4 mL      CAPILLARY BLOOD GLUCOSE      POCT Blood Glucose.: 112 mg/dL (31 Aug 2018 05:47)      PHYSICAL EXAM:    General: NAD  HEENT: NC/AT; PERRL, clear conjunctiva  Neck: supple  Respiratory: CTA b/l  Cardiovascular: +S1/S2; RRR  Abdomen: soft, NT/ND; +BS x4  Extremities: WWP, 2+ peripheral pulses b/l; no LE edema  Skin: normal color and turgor; no rash  Neurological: intubated, sedated    MEDICATIONS:  MEDICATIONS  (STANDING):  ALBUTerol    90 MICROgram(s) HFA Inhaler 2 Puff(s) Inhalation every 6 hours  artificial tears (preservative free) Ophthalmic Solution 1 Drop(s) Both EYES every 12 hours  chlorhexidine 0.12% Liquid 15 milliLiter(s) Swish and Spit every 12 hours  chlorhexidine 4% Liquid 1 Application(s) Topical <User Schedule>  dexmedetomidine Infusion 0.2 MICROgram(s)/kG/Hr (3.52 mL/Hr) IV Continuous <Continuous>  enoxaparin Injectable 40 milliGRAM(s) SubCutaneous every 24 hours  fentaNYL   Infusion. 1 MICROgram(s)/kG/Hr (7.04 mL/Hr) IV Continuous <Continuous>  hydrocortisone sodium succinate Injectable 100 milliGRAM(s) IV Push every 12 hours  ipratropium 17 MICROgram(s) HFA Inhaler 1 Puff(s) Inhalation every 6 hours  piperacillin/tazobactam IVPB. 3.375 Gram(s) IV Intermittent every 8 hours  sodium chloride 3%  Inhalation 4 milliLiter(s) Inhalation two times a day    MEDICATIONS  (PRN):      ALLERGIES:  Allergies    No Known Allergies    Intolerances        LABS:                        9.8    10.66 )-----------( 314      ( 31 Aug 2018 03:15 )             30.2     08-31    145  |  105  |  27<H>  ----------------------------<  109<H>  3.9   |  29  |  0.74    Ca    8.8      31 Aug 2018 03:15  Phos  2.8     08-31  Mg     2.7     08-31    TPro  5.9<L>  /  Alb  2.8<L>  /  TBili  0.7  /  DBili  x   /  AST  54<H>  /  ALT  64<H>  /  AlkPhos  62  08-31    PT/INR - ( 30 Aug 2018 03:00 )   PT: 11.2 SEC;   INR: 0.97          PTT - ( 30 Aug 2018 03:00 )  PTT:26.7 SEC      RADIOLOGY & ADDITIONAL TESTS: Reviewed.

## 2018-08-31 NOTE — PROGRESS NOTE ADULT - ASSESSMENT
61 y/o F w/ a pmh significant for Sarcoidosis, TIA and PPM who initially presented to the ED w/ a chief complaint of SOB and AMS- intubated and admitted to MICU due to hypoxic/hypercapnic respiratory failure in setting of pulmonary edema and basilar PNA      #Neuro  - Intubated, sedated  - CT Head negative for acute bleed  - Required IVP Fentanyl and Versed over night 2/2 agitation      #Resp  - Hypoxic/hypercapnic respiratory failure of unclear etiology as POCUS shows normal LV systolic function  - POCUS shows bibasilar consolidations  - c/w Hydrocortisone 100mg Q12H  - Plan for SBT this AM. May require rapid extubation as pt becomes uncomfortable/agitated while on SBT    #Cardio   - Off vasopressor support  - Hemodynamically stable at this time    #ID  - Mild increase in Leukocytosis 10.9 in setting of Hydrocortisone vs. ?bibasilar consolidations on admission CXR (likely fluid overload)   - c/w abx (5/5)    #Metabolic  - Monitor Cr, urine output     FEN/GI  - NPO on tube feeds    #Prophylaxis   - Venodynes for DVT prophylaxis 61 y/o F w/ a pmh significant for Sarcoidosis, TIA and PPM who initially presented to the ED w/ a chief complaint of SOB and AMS- intubated and admitted to MICU due to hypoxic/hypercapnic respiratory failure in setting of pulmonary edema and basilar PNA      #Neuro  - Sedated  - CT Head negative for acute bleed  - c/w Precedex for now    #Resp  - Hypoxic/hypercapnic respiratory failure of unclear etiology as POCUS shows normal LV systolic function  - POCUS shows bibasilar consolidations  - c/w Hydrocortisone 100mg Q12H  - Plan for SBT this AM. May require rapid extubation as pt becomes uncomfortable/agitated while on SBT    #Cardio   - Off vasopressor support  - Septic shock now resolved.  - Hemodynamically stable at this time    #ID  - Mild increase in Leukocytosis 10.9 in setting of Hydrocortisone vs. ?bibasilar consolidations on admission CXR (likely fluid overload)   - s/p Vancomycin and Zosyn 7/7    #Metabolic  - Monitor Cr, urine output     FEN/GI  - NPO on tube feeds    #Prophylaxis   - Venodynes for DVT prophylaxis

## 2018-09-01 LAB
ALBUMIN SERPL ELPH-MCNC: 3 G/DL — LOW (ref 3.3–5)
ALP SERPL-CCNC: 67 U/L — SIGNIFICANT CHANGE UP (ref 40–120)
ALT FLD-CCNC: 62 U/L — HIGH (ref 4–33)
AST SERPL-CCNC: 45 U/L — HIGH (ref 4–32)
BASE EXCESS BLDA CALC-SCNC: 4.5 MMOL/L — SIGNIFICANT CHANGE UP
BASOPHILS # BLD AUTO: 0.02 K/UL — SIGNIFICANT CHANGE UP (ref 0–0.2)
BASOPHILS NFR BLD AUTO: 0.2 % — SIGNIFICANT CHANGE UP (ref 0–2)
BILIRUB SERPL-MCNC: 1.1 MG/DL — SIGNIFICANT CHANGE UP (ref 0.2–1.2)
BUN SERPL-MCNC: 30 MG/DL — HIGH (ref 7–23)
BUN SERPL-MCNC: 34 MG/DL — HIGH (ref 7–23)
CALCIUM SERPL-MCNC: 9.2 MG/DL — SIGNIFICANT CHANGE UP (ref 8.4–10.5)
CALCIUM SERPL-MCNC: 9.4 MG/DL — SIGNIFICANT CHANGE UP (ref 8.4–10.5)
CHLORIDE BLDA-SCNC: 111 MMOL/L — HIGH (ref 96–108)
CHLORIDE SERPL-SCNC: 104 MMOL/L — SIGNIFICANT CHANGE UP (ref 98–107)
CHLORIDE SERPL-SCNC: 105 MMOL/L — SIGNIFICANT CHANGE UP (ref 98–107)
CO2 SERPL-SCNC: 27 MMOL/L — SIGNIFICANT CHANGE UP (ref 22–31)
CO2 SERPL-SCNC: 29 MMOL/L — SIGNIFICANT CHANGE UP (ref 22–31)
CREAT SERPL-MCNC: 0.78 MG/DL — SIGNIFICANT CHANGE UP (ref 0.5–1.3)
CREAT SERPL-MCNC: 0.85 MG/DL — SIGNIFICANT CHANGE UP (ref 0.5–1.3)
EOSINOPHIL # BLD AUTO: 0.02 K/UL — SIGNIFICANT CHANGE UP (ref 0–0.5)
EOSINOPHIL NFR BLD AUTO: 0.2 % — SIGNIFICANT CHANGE UP (ref 0–6)
GLUCOSE BLDA-MCNC: 143 MG/DL — HIGH (ref 70–99)
GLUCOSE SERPL-MCNC: 108 MG/DL — HIGH (ref 70–99)
GLUCOSE SERPL-MCNC: 128 MG/DL — HIGH (ref 70–99)
GRAM STN SPT: SIGNIFICANT CHANGE UP
HCO3 BLDA-SCNC: 28 MMOL/L — HIGH (ref 22–26)
HCT VFR BLD CALC: 31.3 % — LOW (ref 34.5–45)
HCT VFR BLDA CALC: 29.6 % — LOW (ref 34.5–46.5)
HGB BLD-MCNC: 9.7 G/DL — LOW (ref 11.5–15.5)
HGB BLDA-MCNC: 9.6 G/DL — LOW (ref 11.5–15.5)
IMM GRANULOCYTES # BLD AUTO: 0.04 # — SIGNIFICANT CHANGE UP
IMM GRANULOCYTES NFR BLD AUTO: 0.4 % — SIGNIFICANT CHANGE UP (ref 0–1.5)
LACTATE BLDA-SCNC: 1.4 MMOL/L — SIGNIFICANT CHANGE UP (ref 0.5–2)
LYMPHOCYTES # BLD AUTO: 1.94 K/UL — SIGNIFICANT CHANGE UP (ref 1–3.3)
LYMPHOCYTES # BLD AUTO: 18.1 % — SIGNIFICANT CHANGE UP (ref 13–44)
MAGNESIUM SERPL-MCNC: 2.6 MG/DL — SIGNIFICANT CHANGE UP (ref 1.6–2.6)
MAGNESIUM SERPL-MCNC: 2.6 MG/DL — SIGNIFICANT CHANGE UP (ref 1.6–2.6)
MCHC RBC-ENTMCNC: 29.6 PG — SIGNIFICANT CHANGE UP (ref 27–34)
MCHC RBC-ENTMCNC: 31 % — LOW (ref 32–36)
MCV RBC AUTO: 95.4 FL — SIGNIFICANT CHANGE UP (ref 80–100)
MONOCYTES # BLD AUTO: 0.67 K/UL — SIGNIFICANT CHANGE UP (ref 0–0.9)
MONOCYTES NFR BLD AUTO: 6.2 % — SIGNIFICANT CHANGE UP (ref 2–14)
NEUTROPHILS # BLD AUTO: 8.05 K/UL — HIGH (ref 1.8–7.4)
NEUTROPHILS NFR BLD AUTO: 74.9 % — SIGNIFICANT CHANGE UP (ref 43–77)
NRBC # FLD: 0 — SIGNIFICANT CHANGE UP
PCO2 BLDA: 44 MMHG — SIGNIFICANT CHANGE UP (ref 32–48)
PH BLDA: 7.43 PH — SIGNIFICANT CHANGE UP (ref 7.35–7.45)
PHOSPHATE SERPL-MCNC: 2.1 MG/DL — LOW (ref 2.5–4.5)
PHOSPHATE SERPL-MCNC: 2.7 MG/DL — SIGNIFICANT CHANGE UP (ref 2.5–4.5)
PLATELET # BLD AUTO: 319 K/UL — SIGNIFICANT CHANGE UP (ref 150–400)
PMV BLD: 10 FL — SIGNIFICANT CHANGE UP (ref 7–13)
PO2 BLDA: 70 MMHG — LOW (ref 83–108)
POTASSIUM BLDA-SCNC: 3.4 MMOL/L — SIGNIFICANT CHANGE UP (ref 3.4–4.5)
POTASSIUM SERPL-MCNC: 2.9 MMOL/L — CRITICAL LOW (ref 3.5–5.3)
POTASSIUM SERPL-MCNC: 3.9 MMOL/L — SIGNIFICANT CHANGE UP (ref 3.5–5.3)
POTASSIUM SERPL-SCNC: 2.9 MMOL/L — CRITICAL LOW (ref 3.5–5.3)
POTASSIUM SERPL-SCNC: 3.9 MMOL/L — SIGNIFICANT CHANGE UP (ref 3.5–5.3)
PROT SERPL-MCNC: 6.4 G/DL — SIGNIFICANT CHANGE UP (ref 6–8.3)
RBC # BLD: 3.28 M/UL — LOW (ref 3.8–5.2)
RBC # FLD: 14.3 % — SIGNIFICANT CHANGE UP (ref 10.3–14.5)
SAO2 % BLDA: 93.9 % — LOW (ref 95–99)
SODIUM BLDA-SCNC: 144 MMOL/L — SIGNIFICANT CHANGE UP (ref 136–146)
SODIUM SERPL-SCNC: 146 MMOL/L — HIGH (ref 135–145)
SODIUM SERPL-SCNC: 147 MMOL/L — HIGH (ref 135–145)
SPECIMEN SOURCE: SIGNIFICANT CHANGE UP
WBC # BLD: 10.74 K/UL — HIGH (ref 3.8–10.5)
WBC # FLD AUTO: 10.74 K/UL — HIGH (ref 3.8–10.5)

## 2018-09-01 PROCEDURE — 31622 DX BRONCHOSCOPE/WASH: CPT

## 2018-09-01 PROCEDURE — 99291 CRITICAL CARE FIRST HOUR: CPT | Mod: 25

## 2018-09-01 PROCEDURE — 31500 INSERT EMERGENCY AIRWAY: CPT

## 2018-09-01 PROCEDURE — 71045 X-RAY EXAM CHEST 1 VIEW: CPT | Mod: 26

## 2018-09-01 RX ORDER — MIDAZOLAM HYDROCHLORIDE 1 MG/ML
8 INJECTION, SOLUTION INTRAMUSCULAR; INTRAVENOUS ONCE
Qty: 0 | Refills: 0 | Status: DISCONTINUED | OUTPATIENT
Start: 2018-09-01 | End: 2018-09-01

## 2018-09-01 RX ORDER — POTASSIUM PHOSPHATE, MONOBASIC POTASSIUM PHOSPHATE, DIBASIC 236; 224 MG/ML; MG/ML
15 INJECTION, SOLUTION INTRAVENOUS ONCE
Qty: 0 | Refills: 0 | Status: COMPLETED | OUTPATIENT
Start: 2018-09-01 | End: 2018-09-01

## 2018-09-01 RX ORDER — MEROPENEM 1 G/30ML
1000 INJECTION INTRAVENOUS EVERY 8 HOURS
Qty: 0 | Refills: 0 | Status: COMPLETED | OUTPATIENT
Start: 2018-09-01 | End: 2018-09-05

## 2018-09-01 RX ORDER — MIDAZOLAM HYDROCHLORIDE 1 MG/ML
4 INJECTION, SOLUTION INTRAMUSCULAR; INTRAVENOUS ONCE
Qty: 0 | Refills: 0 | Status: DISCONTINUED | OUTPATIENT
Start: 2018-09-01 | End: 2018-09-01

## 2018-09-01 RX ORDER — VANCOMYCIN HCL 1 G
1000 VIAL (EA) INTRAVENOUS EVERY 24 HOURS
Qty: 0 | Refills: 0 | Status: DISCONTINUED | OUTPATIENT
Start: 2018-09-01 | End: 2018-09-04

## 2018-09-01 RX ORDER — POTASSIUM CHLORIDE 20 MEQ
40 PACKET (EA) ORAL EVERY 4 HOURS
Qty: 0 | Refills: 0 | Status: DISCONTINUED | OUTPATIENT
Start: 2018-09-01 | End: 2018-09-01

## 2018-09-01 RX ORDER — CHLORHEXIDINE GLUCONATE 213 G/1000ML
1 SOLUTION TOPICAL ONCE
Qty: 0 | Refills: 0 | Status: COMPLETED | OUTPATIENT
Start: 2018-09-01 | End: 2018-09-01

## 2018-09-01 RX ORDER — POTASSIUM PHOSPHATE, MONOBASIC POTASSIUM PHOSPHATE, DIBASIC 236; 224 MG/ML; MG/ML
30 INJECTION, SOLUTION INTRAVENOUS ONCE
Qty: 0 | Refills: 0 | Status: DISCONTINUED | OUTPATIENT
Start: 2018-09-01 | End: 2018-09-01

## 2018-09-01 RX ORDER — PROPOFOL 10 MG/ML
50 INJECTION, EMULSION INTRAVENOUS ONCE
Qty: 0 | Refills: 0 | Status: COMPLETED | OUTPATIENT
Start: 2018-09-01 | End: 2018-09-01

## 2018-09-01 RX ORDER — NOREPINEPHRINE BITARTRATE/D5W 8 MG/250ML
0.05 PLASTIC BAG, INJECTION (ML) INTRAVENOUS
Qty: 8 | Refills: 0 | Status: DISCONTINUED | OUTPATIENT
Start: 2018-09-01 | End: 2018-09-03

## 2018-09-01 RX ORDER — FUROSEMIDE 40 MG
40 TABLET ORAL ONCE
Qty: 0 | Refills: 0 | Status: COMPLETED | OUTPATIENT
Start: 2018-09-01 | End: 2018-09-01

## 2018-09-01 RX ORDER — PROPOFOL 10 MG/ML
5 INJECTION, EMULSION INTRAVENOUS
Qty: 1000 | Refills: 0 | Status: DISCONTINUED | OUTPATIENT
Start: 2018-09-01 | End: 2018-09-03

## 2018-09-01 RX ORDER — PROPOFOL 10 MG/ML
5 INJECTION, EMULSION INTRAVENOUS
Qty: 500 | Refills: 0 | Status: DISCONTINUED | OUTPATIENT
Start: 2018-09-01 | End: 2018-09-01

## 2018-09-01 RX ORDER — FENTANYL CITRATE 50 UG/ML
2 INJECTION INTRAVENOUS
Qty: 2500 | Refills: 0 | Status: DISCONTINUED | OUTPATIENT
Start: 2018-09-01 | End: 2018-09-04

## 2018-09-01 RX ORDER — PROPOFOL 10 MG/ML
100 INJECTION, EMULSION INTRAVENOUS ONCE
Qty: 0 | Refills: 0 | Status: DISCONTINUED | OUTPATIENT
Start: 2018-09-01 | End: 2018-09-01

## 2018-09-01 RX ORDER — HYDROCORTISONE 20 MG
100 TABLET ORAL EVERY 24 HOURS
Qty: 0 | Refills: 0 | Status: DISCONTINUED | OUTPATIENT
Start: 2018-09-01 | End: 2018-09-02

## 2018-09-01 RX ORDER — POTASSIUM CHLORIDE 20 MEQ
40 PACKET (EA) ORAL EVERY 4 HOURS
Qty: 0 | Refills: 0 | Status: COMPLETED | OUTPATIENT
Start: 2018-09-01 | End: 2018-09-01

## 2018-09-01 RX ADMIN — ALBUTEROL 2 PUFF(S): 90 AEROSOL, METERED ORAL at 09:07

## 2018-09-01 RX ADMIN — Medication 1 DROP(S): at 17:14

## 2018-09-01 RX ADMIN — CHLORHEXIDINE GLUCONATE 1 APPLICATION(S): 213 SOLUTION TOPICAL at 17:18

## 2018-09-01 RX ADMIN — Medication 1 PUFF(S): at 03:25

## 2018-09-01 RX ADMIN — Medication 100 MILLIGRAM(S): at 05:29

## 2018-09-01 RX ADMIN — PROPOFOL 50 MILLIGRAM(S): 10 INJECTION, EMULSION INTRAVENOUS at 07:45

## 2018-09-01 RX ADMIN — SODIUM CHLORIDE 4 MILLILITER(S): 9 INJECTION INTRAMUSCULAR; INTRAVENOUS; SUBCUTANEOUS at 22:22

## 2018-09-01 RX ADMIN — Medication 40 MILLIGRAM(S): at 09:36

## 2018-09-01 RX ADMIN — Medication 1 PUFF(S): at 09:06

## 2018-09-01 RX ADMIN — ENOXAPARIN SODIUM 40 MILLIGRAM(S): 100 INJECTION SUBCUTANEOUS at 09:36

## 2018-09-01 RX ADMIN — SODIUM CHLORIDE 4 MILLILITER(S): 9 INJECTION INTRAMUSCULAR; INTRAVENOUS; SUBCUTANEOUS at 09:05

## 2018-09-01 RX ADMIN — Medication 40 MILLIEQUIVALENT(S): at 22:01

## 2018-09-01 RX ADMIN — ALBUTEROL 2 PUFF(S): 90 AEROSOL, METERED ORAL at 15:24

## 2018-09-01 RX ADMIN — Medication 6.6 MICROGRAM(S)/KG/MIN: at 10:34

## 2018-09-01 RX ADMIN — POTASSIUM PHOSPHATE, MONOBASIC POTASSIUM PHOSPHATE, DIBASIC 83.33 MILLIMOLE(S): 236; 224 INJECTION, SOLUTION INTRAVENOUS at 17:25

## 2018-09-01 RX ADMIN — Medication 1 PUFF(S): at 15:24

## 2018-09-01 RX ADMIN — CHLORHEXIDINE GLUCONATE 15 MILLILITER(S): 213 SOLUTION TOPICAL at 05:28

## 2018-09-01 RX ADMIN — MEROPENEM 100 MILLIGRAM(S): 1 INJECTION INTRAVENOUS at 22:01

## 2018-09-01 RX ADMIN — PROPOFOL 2.11 MICROGRAM(S)/KG/MIN: 10 INJECTION, EMULSION INTRAVENOUS at 10:33

## 2018-09-01 RX ADMIN — Medication 250 MILLIGRAM(S): at 17:16

## 2018-09-01 RX ADMIN — Medication 1 DROP(S): at 05:29

## 2018-09-01 RX ADMIN — Medication 1 PUFF(S): at 22:18

## 2018-09-01 RX ADMIN — ALBUTEROL 2 PUFF(S): 90 AEROSOL, METERED ORAL at 22:17

## 2018-09-01 RX ADMIN — MIDAZOLAM HYDROCHLORIDE 4 MILLIGRAM(S): 1 INJECTION, SOLUTION INTRAMUSCULAR; INTRAVENOUS at 07:45

## 2018-09-01 RX ADMIN — CHLORHEXIDINE GLUCONATE 1 APPLICATION(S): 213 SOLUTION TOPICAL at 05:29

## 2018-09-01 RX ADMIN — ALBUTEROL 2 PUFF(S): 90 AEROSOL, METERED ORAL at 03:25

## 2018-09-01 RX ADMIN — Medication 40 MILLIEQUIVALENT(S): at 17:25

## 2018-09-01 RX ADMIN — LISINOPRIL 5 MILLIGRAM(S): 2.5 TABLET ORAL at 05:29

## 2018-09-01 RX ADMIN — FENTANYL CITRATE 14.08 MICROGRAM(S)/KG/HR: 50 INJECTION INTRAVENOUS at 17:15

## 2018-09-01 RX ADMIN — POTASSIUM PHOSPHATE, MONOBASIC POTASSIUM PHOSPHATE, DIBASIC 83.33 MILLIMOLE(S): 236; 224 INJECTION, SOLUTION INTRAVENOUS at 21:49

## 2018-09-01 RX ADMIN — CHLORHEXIDINE GLUCONATE 15 MILLILITER(S): 213 SOLUTION TOPICAL at 17:14

## 2018-09-01 RX ADMIN — Medication 0.25 MILLIGRAM(S): at 05:28

## 2018-09-01 NOTE — PROCEDURE NOTE - SUPERVISORY STATEMENT
Aspiration was suspected before intubation and was seen during intubation. Otherwise intubation was done in first pass without complications.

## 2018-09-01 NOTE — PROCEDURE NOTE - NSPROCDETAILS_GEN_ALL_CORE
patient pre-oxygenated, tube inserted, placement confirmed
patient pre-oxygenated, tube inserted, placement confirmed
blood seen on insertion/sterile technique, catheter placed/ultrasound utilization/flushes easily/dressing applied/secured in place
sterile technique, catheter placed/ultrasound utilization/secured in place/blood seen on insertion/dressing applied/flushes easily

## 2018-09-01 NOTE — PROGRESS NOTE ADULT - ASSESSMENT
61 y/o F w/ a pmh significant for Sarcoidosis, TIA and PPM who initially presented to the ED w/ a chief complaint of SOB and AMS- intubated and admitted to MICU due to hypoxic/hypercapnic respiratory failure in setting of pulmonary edema and basilar PNA      #Neuro  - Sedated  - CT Head negative for acute bleed  - c/w Precedex for now    #Resp  - Hypoxic/hypercapnic respiratory failure of unclear etiology as POCUS shows normal LV systolic function  - POCUS shows bibasilar consolidations  - c/w Hydrocortisone 100mg Q12H  - Plan for SBT this AM. May require rapid extubation as pt becomes uncomfortable/agitated while on SBT    #Cardio   - Off vasopressor support  - Septic shock from 8/25-8/28, now resolved.  - Hemodynamically stable at this time    #ID  - Mild increase in Leukocytosis 10.7 in setting of Hydrocortisone vs. ?bibasilar consolidations on admission CXR (likely fluid overload)   - Completed Vancomycin and Zosyn 7 day course yesterday    #Metabolic  - Monitor Cr, urine output     FEN/GI  - NPO on tube feeds    #Prophylaxis   - Venodynes for DVT prophylaxis 59 y/o F w/ a pmh significant for Sarcoidosis, TIA and PPM who initially presented to the ED w/ a chief complaint of SOB and AMS- intubated and admitted to MICU due to hypoxic/hypercapnic respiratory failure in setting of pulmonary edema and basilar PNA      #Neuro  - Sedated  - CT Head negative for acute bleed      #Resp  - Hypoxic/hypercapnic respiratory failure of unclear etiology as POCUS shows normal LV systolic function  - POCUS shows bibasilar consolidations  - c/w Hydrocortisone 100mg Q12H  - Respiratory distress/hypoxia following extubation today- re-intubated successfully. 's following extubation- POCUS showed scattered B lines in b/l lung fields. Significant oral secretions suctioned concerning for possible aspiration PNA.       #Cardio   - c/w vasopressor support  - Septic shock from 8/25-8/28, now resolved.  - Hemodynamically stable at this time    #ID  - Mild increase in Leukocytosis 10.7 in setting of Hydrocortisone vs. ?bibasilar consolidations on admission CXR (likely fluid overload)   - Completed Vancomycin and Zosyn 7 day course yesterday  - Will  begin empiric abx coverage w/ Zosyn given possible aspiration event.    #Metabolic  - Monitor Cr, urine output     FEN/GI  - NPO on tube feeds    #Prophylaxis   - Venodynes for DVT prophylaxis 61 y/o F w/ a pmh significant for Sarcoidosis, TIA and PPM who initially presented to the ED w/ a chief complaint of SOB and AMS- intubated and admitted to MICU due to hypoxic/hypercapnic respiratory failure in setting of pulmonary edema and basilar PNA      #Neuro  - Sedated  - CT Head negative for acute bleed    #Resp  - Hypoxic/hypercapnic respiratory failure of unclear etiology as POCUS shows normal LV systolic function  - POCUS shows bibasilar consolidations  - Down titrating hydrocortisone 100mg Q12H to Prednisone tmw.   - Respiratory distress/hypoxia following extubation today- re-intubated successfully. 's following extubation- POCUS showed scattered B lines in b/l lung fields. Significant oral secretions suctioned concerning for possible aspiration PNA/ pneumonitis       #Cardio   - c/w vasopressor support  - Septic shock from 8/25-8/28, now resolved.  - Hemodynamically stable at this time.   - POCUS shows B-lines anteriorly will give Lasix 40mg IVP x1 and reevalute BMP later today.     #ID  - Mild increase in Leukocytosis 10.7 in setting of Hydrocortisone vs. ?bibasilar consolidations on admission CXR (likely fluid overload)   - Completed Vancomycin and Zosyn 7 day course yesterday  - Holding off on abx for now.     #Metabolic  - Monitor Cr, urine output     FEN/GI  - NPO on tube feeds    #Prophylaxis   - Lovenox

## 2018-09-01 NOTE — PROCEDURE NOTE - NSINDICATIONS_GEN_A_CORE
respiratory failure/airway protection/respiratory distress
airway protection/respiratory failure
emergency venous access
emergency venous access

## 2018-09-01 NOTE — PROGRESS NOTE ADULT - SUBJECTIVE AND OBJECTIVE BOX
INTERVAL HPI/OVERNIGHT EVENTS:    SUBJECTIVE: Patient seen and examined at bedside.  No acute events over night.       OBJECTIVE:    VITAL SIGNS:  ICU Vital Signs Last 24 Hrs  T(C): 37.2 (01 Sep 2018 04:00), Max: 37.3 (31 Aug 2018 20:00)  T(F): 99 (01 Sep 2018 04:00), Max: 99.2 (31 Aug 2018 20:00)  HR: 65 (01 Sep 2018 06:20) (60 - 99)  BP: 150/73 (01 Sep 2018 06:00) (104/55 - 190/95)  BP(mean): 93 (01 Sep 2018 06:00) (66 - 120)  ABP: --  ABP(mean): --  RR: 22 (01 Sep 2018 06:20) (16 - 33)  SpO2: 100% (01 Sep 2018 06:20) (95% - 100%)    Mode: AC/ CMV (Assist Control/ Continuous Mandatory Ventilation), RR (machine): 20, TV (machine): 380, FiO2: 45, PEEP: 5, MAP: 10.9, PIP: 29    08-31 @ 07:01  -  09- @ 07:00  --------------------------------------------------------  IN: 651.6 mL / OUT: 1230 mL / NET: -578.4 mL      CAPILLARY BLOOD GLUCOSE      POCT Blood Glucose.: 120 mg/dL (01 Sep 2018 05:39)      PHYSICAL EXAM:    General: No acute distress.  HEENT: Normocephalic, atraumatic.  PERRL.  EOMI.  Neck: Supple.  Full range of motion.   Heart: RRR.  Normal S1 and S2.  No murmurs, rubs, or gallops.   Lungs: CTAB. No wheezes, crackles, or rhonchi.    Abdomen: BS+, soft, NT/ND.  No organomegaly.  Skin: Warm and dry.  No rashes.  Extremities: No edema, clubbing, or cyanosis.  2+ peripheral pulses b/l.  Musculoskeletal: No deformities.  No spinal or paraspinal tenderness.  Neuro: A&Ox3.  CN II-XII intact.         MEDICATIONS:  MEDICATIONS  (STANDING):  ALBUTerol    90 MICROgram(s) HFA Inhaler 2 Puff(s) Inhalation every 6 hours  ALPRAZolam 0.25 milliGRAM(s) Oral two times a day  artificial tears (preservative free) Ophthalmic Solution 1 Drop(s) Both EYES every 12 hours  chlorhexidine 0.12% Liquid 15 milliLiter(s) Swish and Spit every 12 hours  chlorhexidine 4% Liquid 1 Application(s) Topical <User Schedule>  dexmedetomidine Infusion 0.2 MICROgram(s)/kG/Hr (3.52 mL/Hr) IV Continuous <Continuous>  enoxaparin Injectable 40 milliGRAM(s) SubCutaneous every 24 hours  hydrocortisone sodium succinate Injectable 100 milliGRAM(s) IV Push every 12 hours  ipratropium 17 MICROgram(s) HFA Inhaler 1 Puff(s) Inhalation every 6 hours  lisinopril 5 milliGRAM(s) Oral daily  piperacillin/tazobactam IVPB. 3.375 Gram(s) IV Intermittent every 8 hours  sodium chloride 3%  Inhalation 4 milliLiter(s) Inhalation two times a day    MEDICATIONS  (PRN):  ondansetron Injectable 4 milliGRAM(s) IV Push every 6 hours PRN Nausea and/or Vomiting      ALLERGIES:  Allergies    No Known Allergies    Intolerances        LABS:                        9.7    10.74 )-----------( 319      ( 01 Sep 2018 02:59 )             31.3     -    146<H>  |  105  |  34<H>  ----------------------------<  128<H>  3.9   |  27  |  0.85    Ca    9.4      01 Sep 2018 02:59  Phos  2.7     -  Mg     2.6         TPro  6.4  /  Alb  3.0<L>  /  TBili  1.1  /  DBili  x   /  AST  45<H>  /  ALT  62<H>  /  AlkPhos  67        Urinalysis Basic - ( 31 Aug 2018 16:45 )    Color: RED / Appearance: BLOODY / S.030 / pH: 5.0  Gluc: NEGATIVE / Ketone: NEGATIVE  / Bili: NEGATIVE / Urobili: 1   Blood: LARGE / Protein: 100 / Nitrite: NEGATIVE   Leuk Esterase: TRACE / RBC: TNTC / WBC 3-5   Sq Epi: OCC / Non Sq Epi: x / Bacteria: SMALL        RADIOLOGY & ADDITIONAL TESTS: Reviewed. INTERVAL HPI/OVERNIGHT EVENTS:    SUBJECTIVE: Patient seen and examined at bedside.  No acute events over night.       OBJECTIVE:    VITAL SIGNS:  ICU Vital Signs Last 24 Hrs  T(C): 37.2 (01 Sep 2018 04:00), Max: 37.3 (31 Aug 2018 20:00)  T(F): 99 (01 Sep 2018 04:00), Max: 99.2 (31 Aug 2018 20:00)  HR: 65 (01 Sep 2018 06:20) (60 - 99)  BP: 150/73 (01 Sep 2018 06:00) (104/55 - 190/95)  BP(mean): 93 (01 Sep 2018 06:00) (66 - 120)  ABP: --  ABP(mean): --  RR: 22 (01 Sep 2018 06:20) (16 - 33)  SpO2: 100% (01 Sep 2018 06:20) (95% - 100%)    Mode: AC/ CMV (Assist Control/ Continuous Mandatory Ventilation), RR (machine): 20, TV (machine): 380, FiO2: 45, PEEP: 5, MAP: 10.9, PIP: 29    08-31 @ 07:01  -  09- @ 07:00  --------------------------------------------------------  IN: 651.6 mL / OUT: 1230 mL / NET: -578.4 mL      CAPILLARY BLOOD GLUCOSE      POCT Blood Glucose.: 120 mg/dL (01 Sep 2018 05:39)      PHYSICAL EXAM:    General: Sedated  HEENT: Normocephalic, atraumatic.  PERRL.   Neck: Supple  Heart: RRR.  Normal S1 and S2.  No murmurs, rubs, or gallops.   Lungs: intubated. coarse breath sounds b/l lung fields  Abdomen: BS+, soft, NT/ND.  No organomegaly.  Skin: Warm and dry.  No rashes.  Extremities: No edema, clubbing, or cyanosis.  2+ peripheral pulses b/l.  Musculoskeletal: No deformities.    Neuro: sedated        MEDICATIONS:  MEDICATIONS  (STANDING):  ALBUTerol    90 MICROgram(s) HFA Inhaler 2 Puff(s) Inhalation every 6 hours  ALPRAZolam 0.25 milliGRAM(s) Oral two times a day  artificial tears (preservative free) Ophthalmic Solution 1 Drop(s) Both EYES every 12 hours  chlorhexidine 0.12% Liquid 15 milliLiter(s) Swish and Spit every 12 hours  chlorhexidine 4% Liquid 1 Application(s) Topical <User Schedule>  dexmedetomidine Infusion 0.2 MICROgram(s)/kG/Hr (3.52 mL/Hr) IV Continuous <Continuous>  enoxaparin Injectable 40 milliGRAM(s) SubCutaneous every 24 hours  hydrocortisone sodium succinate Injectable 100 milliGRAM(s) IV Push every 12 hours  ipratropium 17 MICROgram(s) HFA Inhaler 1 Puff(s) Inhalation every 6 hours  lisinopril 5 milliGRAM(s) Oral daily  piperacillin/tazobactam IVPB. 3.375 Gram(s) IV Intermittent every 8 hours  sodium chloride 3%  Inhalation 4 milliLiter(s) Inhalation two times a day    MEDICATIONS  (PRN):  ondansetron Injectable 4 milliGRAM(s) IV Push every 6 hours PRN Nausea and/or Vomiting      ALLERGIES:  Allergies    No Known Allergies    Intolerances        LABS:                        9.7    10.74 )-----------( 319      ( 01 Sep 2018 02:59 )             31.3     09-    146<H>  |  105  |  34<H>  ----------------------------<  128<H>  3.9   |  27  |  0.85    Ca    9.4      01 Sep 2018 02:59  Phos  2.7       Mg     2.6         TPro  6.4  /  Alb  3.0<L>  /  TBili  1.1  /  DBili  x   /  AST  45<H>  /  ALT  62<H>  /  AlkPhos  67  -      Urinalysis Basic - ( 31 Aug 2018 16:45 )    Color: RED / Appearance: BLOODY / S.030 / pH: 5.0  Gluc: NEGATIVE / Ketone: NEGATIVE  / Bili: NEGATIVE / Urobili: 1   Blood: LARGE / Protein: 100 / Nitrite: NEGATIVE   Leuk Esterase: TRACE / RBC: TNTC / WBC 3-5   Sq Epi: OCC / Non Sq Epi: x / Bacteria: SMALL        RADIOLOGY & ADDITIONAL TESTS: Reviewed.

## 2018-09-01 NOTE — PROCEDURE NOTE - NSINFORMCONSENT_GEN_A_CORE
This was an emergent procedure.

## 2018-09-01 NOTE — PROCEDURE NOTE - NSTRACHPOSTINTU_RESP_A_CORE
Positive end tidal Co2 noted/Chest excursion noted/Appropriate capnography/Breath sounds bilateral/Breath sounds equal/Chest X-Ray
Breath sounds equal/Chest excursion noted/Appropriate capnography/Chest X-Ray/Positive end tidal Co2 noted/Breath sounds bilateral

## 2018-09-01 NOTE — CHART NOTE - NSCHARTNOTEFT_GEN_A_CORE
Indication: Acute on chronic respiratory failure    Operators: Clay Marroquin    Preop medications: Propfol gtt    Findings:  The bronchoscope was inserted through the ETT. The ETT was noted to be in good position. Airway evaluation revealed copious dark yellow secretions, mostly within the proximal airway which were suctioned. Mucosa noted to be edematous throughout right and left bronchial tree. There was also airway narrowing within the proximal airway extending down to the subsegmental levels.    Specimens:  Sent for microbiology

## 2018-09-02 LAB
ALBUMIN SERPL ELPH-MCNC: 3 G/DL — LOW (ref 3.3–5)
ALP SERPL-CCNC: 62 U/L — SIGNIFICANT CHANGE UP (ref 40–120)
ALT FLD-CCNC: 53 U/L — HIGH (ref 4–33)
AST SERPL-CCNC: 37 U/L — HIGH (ref 4–32)
BASOPHILS # BLD AUTO: 0.03 K/UL — SIGNIFICANT CHANGE UP (ref 0–0.2)
BASOPHILS NFR BLD AUTO: 0.3 % — SIGNIFICANT CHANGE UP (ref 0–2)
BILIRUB SERPL-MCNC: 1.4 MG/DL — HIGH (ref 0.2–1.2)
BUN SERPL-MCNC: 28 MG/DL — HIGH (ref 7–23)
CALCIUM SERPL-MCNC: 9.1 MG/DL — SIGNIFICANT CHANGE UP (ref 8.4–10.5)
CHLORIDE SERPL-SCNC: 110 MMOL/L — HIGH (ref 98–107)
CO2 SERPL-SCNC: 26 MMOL/L — SIGNIFICANT CHANGE UP (ref 22–31)
CREAT SERPL-MCNC: 0.72 MG/DL — SIGNIFICANT CHANGE UP (ref 0.5–1.3)
EOSINOPHIL # BLD AUTO: 0.43 K/UL — SIGNIFICANT CHANGE UP (ref 0–0.5)
EOSINOPHIL NFR BLD AUTO: 4.2 % — SIGNIFICANT CHANGE UP (ref 0–6)
GLUCOSE SERPL-MCNC: 89 MG/DL — SIGNIFICANT CHANGE UP (ref 70–99)
HCT VFR BLD CALC: 29.7 % — LOW (ref 34.5–45)
HGB BLD-MCNC: 9.2 G/DL — LOW (ref 11.5–15.5)
IMM GRANULOCYTES # BLD AUTO: 0.04 # — SIGNIFICANT CHANGE UP
IMM GRANULOCYTES NFR BLD AUTO: 0.4 % — SIGNIFICANT CHANGE UP (ref 0–1.5)
LYMPHOCYTES # BLD AUTO: 2.49 K/UL — SIGNIFICANT CHANGE UP (ref 1–3.3)
LYMPHOCYTES # BLD AUTO: 24.3 % — SIGNIFICANT CHANGE UP (ref 13–44)
MAGNESIUM SERPL-MCNC: 2.7 MG/DL — HIGH (ref 1.6–2.6)
MCHC RBC-ENTMCNC: 29.6 PG — SIGNIFICANT CHANGE UP (ref 27–34)
MCHC RBC-ENTMCNC: 31 % — LOW (ref 32–36)
MCV RBC AUTO: 95.5 FL — SIGNIFICANT CHANGE UP (ref 80–100)
MONOCYTES # BLD AUTO: 0.65 K/UL — SIGNIFICANT CHANGE UP (ref 0–0.9)
MONOCYTES NFR BLD AUTO: 6.4 % — SIGNIFICANT CHANGE UP (ref 2–14)
NEUTROPHILS # BLD AUTO: 6.59 K/UL — SIGNIFICANT CHANGE UP (ref 1.8–7.4)
NEUTROPHILS NFR BLD AUTO: 64.4 % — SIGNIFICANT CHANGE UP (ref 43–77)
NRBC # FLD: 0.03 — SIGNIFICANT CHANGE UP
PHOSPHATE SERPL-MCNC: 1.9 MG/DL — LOW (ref 2.5–4.5)
PLATELET # BLD AUTO: 333 K/UL — SIGNIFICANT CHANGE UP (ref 150–400)
PMV BLD: 10 FL — SIGNIFICANT CHANGE UP (ref 7–13)
POTASSIUM SERPL-MCNC: 4.1 MMOL/L — SIGNIFICANT CHANGE UP (ref 3.5–5.3)
POTASSIUM SERPL-SCNC: 4.1 MMOL/L — SIGNIFICANT CHANGE UP (ref 3.5–5.3)
PROT SERPL-MCNC: 6.2 G/DL — SIGNIFICANT CHANGE UP (ref 6–8.3)
RBC # BLD: 3.11 M/UL — LOW (ref 3.8–5.2)
RBC # FLD: 15 % — HIGH (ref 10.3–14.5)
SODIUM SERPL-SCNC: 149 MMOL/L — HIGH (ref 135–145)
WBC # BLD: 10.23 K/UL — SIGNIFICANT CHANGE UP (ref 3.8–10.5)
WBC # FLD AUTO: 10.23 K/UL — SIGNIFICANT CHANGE UP (ref 3.8–10.5)

## 2018-09-02 PROCEDURE — 99291 CRITICAL CARE FIRST HOUR: CPT

## 2018-09-02 PROCEDURE — 76937 US GUIDE VASCULAR ACCESS: CPT | Mod: 26

## 2018-09-02 PROCEDURE — 36000 PLACE NEEDLE IN VEIN: CPT

## 2018-09-02 RX ORDER — ENOXAPARIN SODIUM 100 MG/ML
40 INJECTION SUBCUTANEOUS EVERY 24 HOURS
Qty: 0 | Refills: 0 | Status: DISCONTINUED | OUTPATIENT
Start: 2018-09-02 | End: 2018-09-07

## 2018-09-02 RX ORDER — SODIUM,POTASSIUM PHOSPHATES 278-250MG
1 POWDER IN PACKET (EA) ORAL EVERY 4 HOURS
Qty: 0 | Refills: 0 | Status: COMPLETED | OUTPATIENT
Start: 2018-09-02 | End: 2018-09-02

## 2018-09-02 RX ORDER — MIDAZOLAM HYDROCHLORIDE 1 MG/ML
4 INJECTION, SOLUTION INTRAMUSCULAR; INTRAVENOUS ONCE
Qty: 0 | Refills: 0 | Status: DISCONTINUED | OUTPATIENT
Start: 2018-09-02 | End: 2018-09-02

## 2018-09-02 RX ORDER — FUROSEMIDE 40 MG
40 TABLET ORAL ONCE
Qty: 0 | Refills: 0 | Status: COMPLETED | OUTPATIENT
Start: 2018-09-02 | End: 2018-09-02

## 2018-09-02 RX ORDER — CHLORHEXIDINE GLUCONATE 213 G/1000ML
1 SOLUTION TOPICAL
Qty: 0 | Refills: 0 | Status: DISCONTINUED | OUTPATIENT
Start: 2018-09-02 | End: 2018-09-04

## 2018-09-02 RX ORDER — POTASSIUM PHOSPHATE, MONOBASIC POTASSIUM PHOSPHATE, DIBASIC 236; 224 MG/ML; MG/ML
15 INJECTION, SOLUTION INTRAVENOUS ONCE
Qty: 0 | Refills: 0 | Status: COMPLETED | OUTPATIENT
Start: 2018-09-02 | End: 2018-09-02

## 2018-09-02 RX ORDER — FUROSEMIDE 40 MG
40 TABLET ORAL DAILY
Qty: 0 | Refills: 0 | Status: DISCONTINUED | OUTPATIENT
Start: 2018-09-03 | End: 2018-09-03

## 2018-09-02 RX ADMIN — SODIUM CHLORIDE 4 MILLILITER(S): 9 INJECTION INTRAMUSCULAR; INTRAVENOUS; SUBCUTANEOUS at 09:57

## 2018-09-02 RX ADMIN — ALBUTEROL 2 PUFF(S): 90 AEROSOL, METERED ORAL at 16:46

## 2018-09-02 RX ADMIN — FENTANYL CITRATE 14.08 MICROGRAM(S)/KG/HR: 50 INJECTION INTRAVENOUS at 23:26

## 2018-09-02 RX ADMIN — Medication 1 PUFF(S): at 03:18

## 2018-09-02 RX ADMIN — Medication 100 MILLIGRAM(S): at 05:39

## 2018-09-02 RX ADMIN — LISINOPRIL 5 MILLIGRAM(S): 2.5 TABLET ORAL at 09:52

## 2018-09-02 RX ADMIN — Medication 40 MILLIGRAM(S): at 15:05

## 2018-09-02 RX ADMIN — CHLORHEXIDINE GLUCONATE 15 MILLILITER(S): 213 SOLUTION TOPICAL at 05:38

## 2018-09-02 RX ADMIN — Medication 1 TABLET(S): at 10:08

## 2018-09-02 RX ADMIN — CHLORHEXIDINE GLUCONATE 15 MILLILITER(S): 213 SOLUTION TOPICAL at 18:01

## 2018-09-02 RX ADMIN — POTASSIUM PHOSPHATE, MONOBASIC POTASSIUM PHOSPHATE, DIBASIC 62.5 MILLIMOLE(S): 236; 224 INJECTION, SOLUTION INTRAVENOUS at 05:38

## 2018-09-02 RX ADMIN — MEROPENEM 100 MILLIGRAM(S): 1 INJECTION INTRAVENOUS at 14:48

## 2018-09-02 RX ADMIN — ALBUTEROL 2 PUFF(S): 90 AEROSOL, METERED ORAL at 09:56

## 2018-09-02 RX ADMIN — Medication 1 PUFF(S): at 16:46

## 2018-09-02 RX ADMIN — MIDAZOLAM HYDROCHLORIDE 4 MILLIGRAM(S): 1 INJECTION, SOLUTION INTRAMUSCULAR; INTRAVENOUS at 14:40

## 2018-09-02 RX ADMIN — Medication 1 PUFF(S): at 09:57

## 2018-09-02 RX ADMIN — Medication 250 MILLIGRAM(S): at 18:00

## 2018-09-02 RX ADMIN — CHLORHEXIDINE GLUCONATE 1 APPLICATION(S): 213 SOLUTION TOPICAL at 05:39

## 2018-09-02 RX ADMIN — SODIUM CHLORIDE 4 MILLILITER(S): 9 INJECTION INTRAMUSCULAR; INTRAVENOUS; SUBCUTANEOUS at 22:10

## 2018-09-02 RX ADMIN — ALBUTEROL 2 PUFF(S): 90 AEROSOL, METERED ORAL at 03:17

## 2018-09-02 RX ADMIN — FENTANYL CITRATE 14.08 MICROGRAM(S)/KG/HR: 50 INJECTION INTRAVENOUS at 08:00

## 2018-09-02 RX ADMIN — Medication 40 MILLIGRAM(S): at 18:14

## 2018-09-02 RX ADMIN — Medication 1 TABLET(S): at 06:54

## 2018-09-02 RX ADMIN — ENOXAPARIN SODIUM 40 MILLIGRAM(S): 100 INJECTION SUBCUTANEOUS at 09:53

## 2018-09-02 RX ADMIN — MEROPENEM 100 MILLIGRAM(S): 1 INJECTION INTRAVENOUS at 05:38

## 2018-09-02 RX ADMIN — PROPOFOL 2.11 MICROGRAM(S)/KG/MIN: 10 INJECTION, EMULSION INTRAVENOUS at 23:26

## 2018-09-02 RX ADMIN — Medication 1 DROP(S): at 18:07

## 2018-09-02 RX ADMIN — ALBUTEROL 2 PUFF(S): 90 AEROSOL, METERED ORAL at 22:11

## 2018-09-02 RX ADMIN — Medication 1 PUFF(S): at 22:11

## 2018-09-02 RX ADMIN — PROPOFOL 2.11 MICROGRAM(S)/KG/MIN: 10 INJECTION, EMULSION INTRAVENOUS at 08:00

## 2018-09-02 RX ADMIN — Medication 1 DROP(S): at 05:39

## 2018-09-02 RX ADMIN — MEROPENEM 100 MILLIGRAM(S): 1 INJECTION INTRAVENOUS at 21:36

## 2018-09-02 NOTE — PROGRESS NOTE ADULT - SUBJECTIVE AND OBJECTIVE BOX
CHIEF COMPLAINT: Respiratory Failure    Interval Events: No acute events overnight. Yesterday, extubation attempted and failed likely 2/2 pulmonary edema and aspiration.    REVIEW OF SYSTEMS:  Constitutional: [ ] negative [ ] fevers [ ] chills [ ] weight loss [ ] weight gain  HEENT: [ ] negative [ ] dry eyes [ ] eye irritation [ ] postnasal drip [ ] nasal congestion  CV: [ ] negative  [ ] chest pain [ ] orthopnea [ ] palpitations [ ] murmur  Resp: [ ] negative [ ] cough [ ] shortness of breath [ ] dyspnea [ ] wheezing [ ] sputum [ ] hemoptysis  GI: [ ] negative [ ] nausea [ ] vomiting [ ] diarrhea [ ] constipation [ ] abd pain [ ] dysphagia   : [ ] negative [ ] dysuria [ ] nocturia [ ] hematuria [ ] increased urinary frequency  Musculoskeletal: [ ] negative [ ] back pain [ ] myalgias [ ] arthralgias [ ] fracture  Skin: [ ] negative [ ] rash [ ] itch  Neurological: [ ] negative [ ] headache [ ] dizziness [ ] syncope [ ] weakness [ ] numbness  Psychiatric: [ ] negative [ ] anxiety [ ] depression  Endocrine: [ ] negative [ ] diabetes [ ] thyroid problem  Hematologic/Lymphatic: [ ] negative [ ] anemia [ ] bleeding problem  Allergic/Immunologic: [ ] negative [ ] itchy eyes [ ] nasal discharge [ ] hives [ ] angioedema  [ ] All other systems negative  [x ] Unable to assess ROS because intubated and sedated    OBJECTIVE:  ICU Vital Signs Last 24 Hrs  T(C): 37.2 (02 Sep 2018 08:00), Max: 37.3 (01 Sep 2018 12:00)  T(F): 98.9 (02 Sep 2018 08:00), Max: 99.2 (01 Sep 2018 12:00)  HR: 82 (02 Sep 2018 11:45) (60 - 95)  BP: 123/65 (02 Sep 2018 10:00) (100/59 - 135/81)  BP(mean): 79 (02 Sep 2018 10:00) (69 - 95)  ABP: --  ABP(mean): --  RR: 20 (02 Sep 2018 10:00) (17 - 21)  SpO2: 100% (02 Sep 2018 11:45) (96% - 100%)    Mode: AC/ CMV (Assist Control/ Continuous Mandatory Ventilation), RR (machine): 20, TV (machine): 380, FiO2: 40, PEEP: 5, MAP: 9.6, PIP: 24     @ 07: @ 07:00  --------------------------------------------------------  IN: 2114.7 mL / OUT: 2020 mL / NET: 94.7 mL     @ 07: @ 11:51  --------------------------------------------------------  IN: 105 mL / OUT: 175 mL / NET: -70 mL      CAPILLARY BLOOD GLUCOSE      POCT Blood Glucose.: 87 mg/dL (02 Sep 2018 05:45)      PHYSICAL EXAM:  General: Laying in bed, NAD, attempting to communicate with writing board  HEENT: PERRL  Lymph Nodes: No LAD  Neck: Supple  Respiratory: Coarse breath sounds bilaterally  Cardiovascular: S1S2, RRR  Abdomen: Soft, nontender  Extremities: No LE edema  Skin: No rash  Neurological: Awake, follows commands, attempts to communicate with writing board  Psychiatry: Unable to assess    LINES: [facility]    HOSPITAL MEDICATIONS:  enoxaparin Injectable 40 milliGRAM(s) SubCutaneous every 24 hours    meropenem  IVPB 1000 milliGRAM(s) IV Intermittent every 8 hours  vancomycin  IVPB 1000 milliGRAM(s) IV Intermittent every 24 hours    lisinopril 5 milliGRAM(s) Oral daily  norepinephrine Infusion 0.05 MICROgram(s)/kG/Min IV Continuous <Continuous>    methylPREDNISolone sodium succinate Injectable 40 milliGRAM(s) IV Push once    ALBUTerol    90 MICROgram(s) HFA Inhaler 2 Puff(s) Inhalation every 6 hours  ipratropium 17 MICROgram(s) HFA Inhaler 1 Puff(s) Inhalation every 6 hours  sodium chloride 3%  Inhalation 4 milliLiter(s) Inhalation two times a day    fentaNYL   Infusion. 2 MICROgram(s)/kG/Hr IV Continuous <Continuous>  ondansetron Injectable 4 milliGRAM(s) IV Push every 6 hours PRN  propofol Infusion 5 MICROgram(s)/kG/Min IV Continuous <Continuous>              artificial tears (preservative free) Ophthalmic Solution 1 Drop(s) Both EYES every 12 hours  chlorhexidine 0.12% Liquid 15 milliLiter(s) Swish and Spit every 12 hours  chlorhexidine 4% Liquid 1 Application(s) Topical <User Schedule>        LABS:                        9.2    10.23 )-----------( 333      ( 02 Sep 2018 02:30 )             29.7     Hgb Trend: 9.2<--, 9.7<--, 9.8<--, 10.6<--, 10.9<--  09    149<H>  |  110<H>  |  28<H>  ----------------------------<  89  4.1   |  26  |  0.72    Ca    9.1      02 Sep 2018 02:30  Phos  1.9       Mg     2.7         TPro  6.2  /  Alb  3.0<L>  /  TBili  1.4<H>  /  DBili  x   /  AST  37<H>  /  ALT  53<H>  /  AlkPhos  62  02    Creatinine Trend: 0.72<--, 0.78<--, 0.85<--, 0.74<--, 0.72<--, 0.82<--    Urinalysis Basic - ( 31 Aug 2018 16:45 )    Color: RED / Appearance: BLOODY / S.030 / pH: 5.0  Gluc: NEGATIVE / Ketone: NEGATIVE  / Bili: NEGATIVE / Urobili: 1   Blood: LARGE / Protein: 100 / Nitrite: NEGATIVE   Leuk Esterase: TRACE / RBC: TNTC / WBC 3-5   Sq Epi: OCC / Non Sq Epi: x / Bacteria: SMALL      Arterial Blood Gas:   @ 09:25  7.43/44/70/28/93.9/4.5  ABG lactate: 1.4        MICROBIOLOGY: BAL with GPC, GNR    RADIOLOGY:  [x ] Reviewed and interpreted by me, CXR    EKG:

## 2018-09-02 NOTE — PROGRESS NOTE ADULT - ASSESSMENT
61 y/o F w/ a pmh significant for Sarcoidosis, TIA and PPM who initially presented to the ED w/ a chief complaint of SOB and AMS- intubated and admitted to MICU due to hypoxic/hypercapnic respiratory failure in setting of pulmonary edema and basilar PNA      #Neuro  - Sedated, follows commands  - CT Head negative for acute bleed  - Further attempts to communicate today    #Resp  - Hypoxic/hypercapnic respiratory failure of unclear etiology as POCUS shows normal LV systolic function  - POCUS shows bibasilar consolidations  - Stopped steroids today. Will begin Prednisone 40mg times two doses prior to possible extubation tomorrow  - Respiratory distress/hypoxia following extubation today- re-intubated successfully. 's following extubation- POCUS showed scattered B lines in b/l lung fields. Significant oral secretions suctioned concerning for possible aspiration PNA/ pneumonitis   - Attempt extubation again likely tomorrow, may require anxiolytic prior to extubation      #Cardio   - Levophed held this AM, BP stable  - Septic shock from 8/25-8/28, now resolved.  - Hemodynamically stable at this time.   - Given Lasix yesterday, sodium elevated so will consider oral free water today    #ID  - Mild increase in Leukocytosis 10.7 in setting of Hydrocortisone vs. ?bibasilar consolidations on admission CXR (likely fluid overload)   - Completed Vancomycin and Zosyn 7 day course yesterday  - On Vancomycin and Meropenem day 2    #Metabolic  - Monitor Cr, urine output     FEN/GI  - NPO on tube feeds    #Prophylaxis   - Lovenox    Julio Baptiste, PGY-3  MICU  [id number]

## 2018-09-03 LAB
BUN SERPL-MCNC: 21 MG/DL — SIGNIFICANT CHANGE UP (ref 7–23)
CALCIUM SERPL-MCNC: 10 MG/DL — SIGNIFICANT CHANGE UP (ref 8.4–10.5)
CHLORIDE SERPL-SCNC: 102 MMOL/L — SIGNIFICANT CHANGE UP (ref 98–107)
CO2 SERPL-SCNC: 27 MMOL/L — SIGNIFICANT CHANGE UP (ref 22–31)
CREAT SERPL-MCNC: 0.7 MG/DL — SIGNIFICANT CHANGE UP (ref 0.5–1.3)
GLUCOSE SERPL-MCNC: 103 MG/DL — HIGH (ref 70–99)
HCT VFR BLD CALC: 35.8 % — SIGNIFICANT CHANGE UP (ref 34.5–45)
HGB BLD-MCNC: 11.3 G/DL — LOW (ref 11.5–15.5)
MAGNESIUM SERPL-MCNC: 2.7 MG/DL — HIGH (ref 1.6–2.6)
MCHC RBC-ENTMCNC: 29.8 PG — SIGNIFICANT CHANGE UP (ref 27–34)
MCHC RBC-ENTMCNC: 31.6 % — LOW (ref 32–36)
MCV RBC AUTO: 94.5 FL — SIGNIFICANT CHANGE UP (ref 80–100)
NRBC # FLD: 0.06 — SIGNIFICANT CHANGE UP
PHOSPHATE SERPL-MCNC: 2.7 MG/DL — SIGNIFICANT CHANGE UP (ref 2.5–4.5)
PLATELET # BLD AUTO: 413 K/UL — HIGH (ref 150–400)
PMV BLD: 10.3 FL — SIGNIFICANT CHANGE UP (ref 7–13)
POTASSIUM SERPL-MCNC: 3.4 MMOL/L — LOW (ref 3.5–5.3)
POTASSIUM SERPL-SCNC: 3.4 MMOL/L — LOW (ref 3.5–5.3)
RBC # BLD: 3.79 M/UL — LOW (ref 3.8–5.2)
RBC # FLD: 14.6 % — HIGH (ref 10.3–14.5)
SODIUM SERPL-SCNC: 146 MMOL/L — HIGH (ref 135–145)
SPECIMEN SOURCE: SIGNIFICANT CHANGE UP
WBC # BLD: 11.67 K/UL — HIGH (ref 3.8–10.5)
WBC # FLD AUTO: 11.67 K/UL — HIGH (ref 3.8–10.5)

## 2018-09-03 PROCEDURE — 71045 X-RAY EXAM CHEST 1 VIEW: CPT | Mod: 26

## 2018-09-03 PROCEDURE — 99291 CRITICAL CARE FIRST HOUR: CPT

## 2018-09-03 RX ORDER — NITROGLYCERIN 6.5 MG
10 CAPSULE, EXTENDED RELEASE ORAL
Qty: 50 | Refills: 0 | Status: DISCONTINUED | OUTPATIENT
Start: 2018-09-03 | End: 2018-09-03

## 2018-09-03 RX ORDER — POTASSIUM CHLORIDE 20 MEQ
40 PACKET (EA) ORAL ONCE
Qty: 0 | Refills: 0 | Status: COMPLETED | OUTPATIENT
Start: 2018-09-03 | End: 2018-09-03

## 2018-09-03 RX ORDER — FUROSEMIDE 40 MG
60 TABLET ORAL ONCE
Qty: 0 | Refills: 0 | Status: COMPLETED | OUTPATIENT
Start: 2018-09-03 | End: 2018-09-03

## 2018-09-03 RX ORDER — FUROSEMIDE 40 MG
40 TABLET ORAL ONCE
Qty: 0 | Refills: 0 | Status: COMPLETED | OUTPATIENT
Start: 2018-09-03 | End: 2018-09-03

## 2018-09-03 RX ORDER — LABETALOL HCL 100 MG
100 TABLET ORAL ONCE
Qty: 0 | Refills: 0 | Status: COMPLETED | OUTPATIENT
Start: 2018-09-03 | End: 2018-09-03

## 2018-09-03 RX ORDER — CLONAZEPAM 1 MG
1 TABLET ORAL EVERY 12 HOURS
Qty: 0 | Refills: 0 | Status: DISCONTINUED | OUTPATIENT
Start: 2018-09-03 | End: 2018-09-05

## 2018-09-03 RX ADMIN — Medication 1 MILLIGRAM(S): at 05:10

## 2018-09-03 RX ADMIN — Medication 1 DROP(S): at 05:10

## 2018-09-03 RX ADMIN — Medication 40 MILLIGRAM(S): at 13:58

## 2018-09-03 RX ADMIN — Medication 250 MILLIGRAM(S): at 16:23

## 2018-09-03 RX ADMIN — Medication 1 PUFF(S): at 16:13

## 2018-09-03 RX ADMIN — Medication 100 MILLIGRAM(S): at 10:06

## 2018-09-03 RX ADMIN — Medication 1 PUFF(S): at 22:33

## 2018-09-03 RX ADMIN — FENTANYL CITRATE 14.08 MICROGRAM(S)/KG/HR: 50 INJECTION INTRAVENOUS at 07:02

## 2018-09-03 RX ADMIN — ALBUTEROL 2 PUFF(S): 90 AEROSOL, METERED ORAL at 16:13

## 2018-09-03 RX ADMIN — SODIUM CHLORIDE 4 MILLILITER(S): 9 INJECTION INTRAMUSCULAR; INTRAVENOUS; SUBCUTANEOUS at 22:34

## 2018-09-03 RX ADMIN — MEROPENEM 100 MILLIGRAM(S): 1 INJECTION INTRAVENOUS at 21:05

## 2018-09-03 RX ADMIN — LISINOPRIL 5 MILLIGRAM(S): 2.5 TABLET ORAL at 09:48

## 2018-09-03 RX ADMIN — Medication 3 MICROGRAM(S)/MIN: at 17:56

## 2018-09-03 RX ADMIN — Medication 1 PUFF(S): at 10:28

## 2018-09-03 RX ADMIN — MEROPENEM 100 MILLIGRAM(S): 1 INJECTION INTRAVENOUS at 13:42

## 2018-09-03 RX ADMIN — Medication 40 MILLIEQUIVALENT(S): at 09:56

## 2018-09-03 RX ADMIN — FENTANYL CITRATE 14.08 MICROGRAM(S)/KG/HR: 50 INJECTION INTRAVENOUS at 21:05

## 2018-09-03 RX ADMIN — Medication 60 MILLIGRAM(S): at 03:15

## 2018-09-03 RX ADMIN — Medication 40 MILLIGRAM(S): at 05:09

## 2018-09-03 RX ADMIN — ALBUTEROL 2 PUFF(S): 90 AEROSOL, METERED ORAL at 03:44

## 2018-09-03 RX ADMIN — ONDANSETRON 4 MILLIGRAM(S): 8 TABLET, FILM COATED ORAL at 06:00

## 2018-09-03 RX ADMIN — FENTANYL CITRATE 14.08 MICROGRAM(S)/KG/HR: 50 INJECTION INTRAVENOUS at 07:48

## 2018-09-03 RX ADMIN — CHLORHEXIDINE GLUCONATE 15 MILLILITER(S): 213 SOLUTION TOPICAL at 05:09

## 2018-09-03 RX ADMIN — Medication 1 MILLIGRAM(S): at 16:24

## 2018-09-03 RX ADMIN — Medication 1 PUFF(S): at 03:44

## 2018-09-03 RX ADMIN — ALBUTEROL 2 PUFF(S): 90 AEROSOL, METERED ORAL at 22:33

## 2018-09-03 RX ADMIN — MEROPENEM 100 MILLIGRAM(S): 1 INJECTION INTRAVENOUS at 05:09

## 2018-09-03 RX ADMIN — PROPOFOL 2.11 MICROGRAM(S)/KG/MIN: 10 INJECTION, EMULSION INTRAVENOUS at 07:48

## 2018-09-03 RX ADMIN — ENOXAPARIN SODIUM 40 MILLIGRAM(S): 100 INJECTION SUBCUTANEOUS at 09:51

## 2018-09-03 RX ADMIN — ALBUTEROL 2 PUFF(S): 90 AEROSOL, METERED ORAL at 10:28

## 2018-09-03 NOTE — PROVIDER CONTACT NOTE (OTHER) - BACKGROUND
Patient admit to MICU with Dx of Sepsis, respiratory failure. Intubated , started on Vasopressors and sedated. Patient has been NPO, 2/2 nausea , cpap trial and  pending extubation.

## 2018-09-03 NOTE — PROGRESS NOTE ADULT - ASSESSMENT
61 y/o F w/ a pmh significant for Sarcoidosis, TIA and PPM who initially presented to the ED w/ a chief complaint of SOB and AMS- intubated and admitted to MICU due to hypoxic/hypercapnic respiratory failure in setting of pulmonary edema and basilar PNA      #Neuro  - Sedated, follows commands  - CT Head negative for acute bleed  - Further attempts to communicate today    #Resp  - Hypoxic/hypercapnic respiratory failure of unclear etiology as POCUS shows normal LV systolic function  - POCUS shows bibasilar consolidations  - c/w  Prednisone 40mg prior to extubation today   - Attempt extubation today, may require anxiolytic prior to extubation      #Cardio   - Levophed held this AM, BP stable  - Septic shock from 8/25-8/28, now resolved.  - Hemodynamically stable at this time.   - Given Lasix yesterday, sodium elevated so will consider oral free water today    #ID  - Mild increase in Leukocytosis 10.7 in setting of Hydrocortisone vs. ?bibasilar consolidations on admission CXR (likely fluid overload)   - Completed Vancomycin and Zosyn 7 day course yesterday  - On Vancomycin and Meropenem Day 3    #Metabolic  - Monitor Cr, urine output     FEN/GI  - NPO on tube feeds    #Prophylaxis   - Lovenox

## 2018-09-03 NOTE — PROGRESS NOTE ADULT - SUBJECTIVE AND OBJECTIVE BOX
INTERVAL HPI/OVERNIGHT EVENTS:    SUBJECTIVE: Patient seen and examined at bedside. No acute events over night. Pt agitated, pulling at OG tube.      OBJECTIVE:    VITAL SIGNS:  ICU Vital Signs Last 24 Hrs  T(C): 37 (03 Sep 2018 04:00), Max: 37.2 (02 Sep 2018 08:00)  T(F): 98.6 (03 Sep 2018 04:00), Max: 98.9 (02 Sep 2018 08:00)  HR: 91 (03 Sep 2018 07:00) (70 - 103)  BP: 100/65 (03 Sep 2018 07:00) (97/59 - 158/86)  BP(mean): 71 (03 Sep 2018 07:00) (65 - 136)  ABP: --  ABP(mean): --  RR: 15 (03 Sep 2018 07:00) (15 - 28)  SpO2: 100% (03 Sep 2018 07:00) (86% - 100%)    Mode: AC/ CMV (Assist Control/ Continuous Mandatory Ventilation), RR (machine): 20, TV (machine): 380, FiO2: 40, PEEP: 5, MAP: 10, PIP: 25    09-02 @ 07:01  -  09-03 @ 07:00  --------------------------------------------------------  IN: 1023 mL / OUT: 2295 mL / NET: -1272 mL      CAPILLARY BLOOD GLUCOSE      POCT Blood Glucose.: 104 mg/dL (03 Sep 2018 06:04)      PHYSICAL EXAM:    General: sedated  HEENT: NC/AT; PERRL, clear conjunctiva  Neck: supple  Respiratory: coarse breath sounds b/l lung fields  Cardiovascular: +S1/S2; RRR  Abdomen: soft, NT/ND; +BS x4  Extremities: WWP, 2+ peripheral pulses b/l; no LE edema  Skin: normal color and turgor; no rash  Neurological: AAOx3    MEDICATIONS:  MEDICATIONS  (STANDING):  ALBUTerol    90 MICROgram(s) HFA Inhaler 2 Puff(s) Inhalation every 6 hours  artificial tears (preservative free) Ophthalmic Solution 1 Drop(s) Both EYES every 12 hours  chlorhexidine 0.12% Liquid 15 milliLiter(s) Swish and Spit every 12 hours  chlorhexidine 4% Liquid 1 Application(s) Topical <User Schedule>  clonazePAM Tablet 1 milliGRAM(s) Oral every 12 hours  enoxaparin Injectable 40 milliGRAM(s) SubCutaneous every 24 hours  fentaNYL   Infusion. 2 MICROgram(s)/kG/Hr (14.08 mL/Hr) IV Continuous <Continuous>  ipratropium 17 MICROgram(s) HFA Inhaler 1 Puff(s) Inhalation every 6 hours  lisinopril 5 milliGRAM(s) Oral daily  meropenem  IVPB 1000 milliGRAM(s) IV Intermittent every 8 hours  norepinephrine Infusion 0.05 MICROgram(s)/kG/Min (6.6 mL/Hr) IV Continuous <Continuous>  propofol Infusion 5 MICROgram(s)/kG/Min (2.112 mL/Hr) IV Continuous <Continuous>  sodium chloride 3%  Inhalation 4 milliLiter(s) Inhalation two times a day  vancomycin  IVPB 1000 milliGRAM(s) IV Intermittent every 24 hours    MEDICATIONS  (PRN):  ondansetron Injectable 4 milliGRAM(s) IV Push every 6 hours PRN Nausea and/or Vomiting      ALLERGIES:  Allergies    No Known Allergies    Intolerances        LABS:                        9.2    10.23 )-----------( 333      ( 02 Sep 2018 02:30 )             29.7     09-02    149<H>  |  110<H>  |  28<H>  ----------------------------<  89  4.1   |  26  |  0.72    Ca    9.1      02 Sep 2018 02:30  Phos  1.9     09-02  Mg     2.7     09-02    TPro  6.2  /  Alb  3.0<L>  /  TBili  1.4<H>  /  DBili  x   /  AST  37<H>  /  ALT  53<H>  /  AlkPhos  62  09-02          RADIOLOGY & ADDITIONAL TESTS: Reviewed.

## 2018-09-04 LAB
-  CEFAZOLIN: SIGNIFICANT CHANGE UP
-  CIPROFLOXACIN: SIGNIFICANT CHANGE UP
-  CLINDAMYCIN: SIGNIFICANT CHANGE UP
-  ERYTHROMYCIN: SIGNIFICANT CHANGE UP
-  GENTAMICIN: SIGNIFICANT CHANGE UP
-  LEVOFLOXACIN: SIGNIFICANT CHANGE UP
-  LINEZOLID: SIGNIFICANT CHANGE UP
-  MOXIFLOXACIN(AEROBIC): SIGNIFICANT CHANGE UP
-  OXACILLIN: SIGNIFICANT CHANGE UP
-  PENICILLIN: SIGNIFICANT CHANGE UP
-  RIFAMPIN.: SIGNIFICANT CHANGE UP
-  TETRACYCLINE: SIGNIFICANT CHANGE UP
-  TRIMETHOPRIM/SULFAMETHOXAZOLE: SIGNIFICANT CHANGE UP
-  VANCOMYCIN: SIGNIFICANT CHANGE UP
ALBUMIN SERPL ELPH-MCNC: 3.6 G/DL — SIGNIFICANT CHANGE UP (ref 3.3–5)
ALP SERPL-CCNC: 75 U/L — SIGNIFICANT CHANGE UP (ref 40–120)
ALT FLD-CCNC: 56 U/L — HIGH (ref 4–33)
AST SERPL-CCNC: 39 U/L — HIGH (ref 4–32)
BACTERIA SPT RESP CULT: SIGNIFICANT CHANGE UP
BASOPHILS # BLD AUTO: 0.05 K/UL — SIGNIFICANT CHANGE UP (ref 0–0.2)
BASOPHILS NFR BLD AUTO: 0.4 % — SIGNIFICANT CHANGE UP (ref 0–2)
BILIRUB SERPL-MCNC: 0.9 MG/DL — SIGNIFICANT CHANGE UP (ref 0.2–1.2)
BUN SERPL-MCNC: 23 MG/DL — SIGNIFICANT CHANGE UP (ref 7–23)
CALCIUM SERPL-MCNC: 9.9 MG/DL — SIGNIFICANT CHANGE UP (ref 8.4–10.5)
CHLORIDE SERPL-SCNC: 103 MMOL/L — SIGNIFICANT CHANGE UP (ref 98–107)
CO2 SERPL-SCNC: 30 MMOL/L — SIGNIFICANT CHANGE UP (ref 22–31)
CREAT SERPL-MCNC: 0.74 MG/DL — SIGNIFICANT CHANGE UP (ref 0.5–1.3)
EOSINOPHIL # BLD AUTO: 0.31 K/UL — SIGNIFICANT CHANGE UP (ref 0–0.5)
EOSINOPHIL NFR BLD AUTO: 2.5 % — SIGNIFICANT CHANGE UP (ref 0–6)
GLUCOSE SERPL-MCNC: 94 MG/DL — SIGNIFICANT CHANGE UP (ref 70–99)
GRAM STN SPT: SIGNIFICANT CHANGE UP
HBV CORE IGM SER-ACNC: NONREACTIVE — SIGNIFICANT CHANGE UP
HBV SURFACE AB SER-ACNC: NONREACTIVE — SIGNIFICANT CHANGE UP
HCT VFR BLD CALC: 35.3 % — SIGNIFICANT CHANGE UP (ref 34.5–45)
HCV AB S/CO SERPL IA: 0.19 S/CO — SIGNIFICANT CHANGE UP
HCV AB SERPL-IMP: SIGNIFICANT CHANGE UP
HGB BLD-MCNC: 11.4 G/DL — LOW (ref 11.5–15.5)
IMM GRANULOCYTES # BLD AUTO: 0.06 # — SIGNIFICANT CHANGE UP
IMM GRANULOCYTES NFR BLD AUTO: 0.5 % — SIGNIFICANT CHANGE UP (ref 0–1.5)
LYMPHOCYTES # BLD AUTO: 17.9 % — SIGNIFICANT CHANGE UP (ref 13–44)
LYMPHOCYTES # BLD AUTO: 2.24 K/UL — SIGNIFICANT CHANGE UP (ref 1–3.3)
MAGNESIUM SERPL-MCNC: 2.9 MG/DL — HIGH (ref 1.6–2.6)
MCHC RBC-ENTMCNC: 30.6 PG — SIGNIFICANT CHANGE UP (ref 27–34)
MCHC RBC-ENTMCNC: 32.3 % — SIGNIFICANT CHANGE UP (ref 32–36)
MCV RBC AUTO: 94.9 FL — SIGNIFICANT CHANGE UP (ref 80–100)
METHOD TYPE: SIGNIFICANT CHANGE UP
MONOCYTES # BLD AUTO: 0.55 K/UL — SIGNIFICANT CHANGE UP (ref 0–0.9)
MONOCYTES NFR BLD AUTO: 4.4 % — SIGNIFICANT CHANGE UP (ref 2–14)
NEUTROPHILS # BLD AUTO: 9.33 K/UL — HIGH (ref 1.8–7.4)
NEUTROPHILS NFR BLD AUTO: 74.3 % — SIGNIFICANT CHANGE UP (ref 43–77)
NRBC # FLD: 0 — SIGNIFICANT CHANGE UP
ORGANISM # SPEC MICROSCOPIC CNT: SIGNIFICANT CHANGE UP
ORGANISM # SPEC MICROSCOPIC CNT: SIGNIFICANT CHANGE UP
PHOSPHATE SERPL-MCNC: 2.5 MG/DL — SIGNIFICANT CHANGE UP (ref 2.5–4.5)
PLATELET # BLD AUTO: 435 K/UL — HIGH (ref 150–400)
PMV BLD: 9.9 FL — SIGNIFICANT CHANGE UP (ref 7–13)
POTASSIUM SERPL-MCNC: 3.8 MMOL/L — SIGNIFICANT CHANGE UP (ref 3.5–5.3)
POTASSIUM SERPL-SCNC: 3.8 MMOL/L — SIGNIFICANT CHANGE UP (ref 3.5–5.3)
PROT SERPL-MCNC: 7.4 G/DL — SIGNIFICANT CHANGE UP (ref 6–8.3)
RBC # BLD: 3.72 M/UL — LOW (ref 3.8–5.2)
RBC # FLD: 15 % — HIGH (ref 10.3–14.5)
SODIUM SERPL-SCNC: 144 MMOL/L — SIGNIFICANT CHANGE UP (ref 135–145)
VANCOMYCIN TROUGH SERPL-MCNC: 6.5 UG/ML — LOW (ref 10–20)
WBC # BLD: 12.54 K/UL — HIGH (ref 3.8–10.5)
WBC # FLD AUTO: 12.54 K/UL — HIGH (ref 3.8–10.5)

## 2018-09-04 PROCEDURE — 99233 SBSQ HOSP IP/OBS HIGH 50: CPT

## 2018-09-04 RX ORDER — VANCOMYCIN HCL 1 G
VIAL (EA) INTRAVENOUS
Qty: 0 | Refills: 0 | Status: DISCONTINUED | OUTPATIENT
Start: 2018-09-04 | End: 2018-09-06

## 2018-09-04 RX ORDER — VANCOMYCIN HCL 1 G
1000 VIAL (EA) INTRAVENOUS ONCE
Qty: 0 | Refills: 0 | Status: COMPLETED | OUTPATIENT
Start: 2018-09-04 | End: 2018-09-04

## 2018-09-04 RX ORDER — VANCOMYCIN HCL 1 G
1000 VIAL (EA) INTRAVENOUS EVERY 12 HOURS
Qty: 0 | Refills: 0 | Status: DISCONTINUED | OUTPATIENT
Start: 2018-09-05 | End: 2018-09-06

## 2018-09-04 RX ORDER — MAGNESIUM SULFATE 500 MG/ML
1 VIAL (ML) INJECTION ONCE
Qty: 0 | Refills: 0 | Status: DISCONTINUED | OUTPATIENT
Start: 2018-09-04 | End: 2018-09-04

## 2018-09-04 RX ORDER — FUROSEMIDE 40 MG
40 TABLET ORAL ONCE
Qty: 0 | Refills: 0 | Status: COMPLETED | OUTPATIENT
Start: 2018-09-04 | End: 2018-09-04

## 2018-09-04 RX ADMIN — MEROPENEM 100 MILLIGRAM(S): 1 INJECTION INTRAVENOUS at 05:34

## 2018-09-04 RX ADMIN — Medication 1 PUFF(S): at 22:14

## 2018-09-04 RX ADMIN — Medication 40 MILLIGRAM(S): at 05:33

## 2018-09-04 RX ADMIN — Medication 1 PUFF(S): at 10:42

## 2018-09-04 RX ADMIN — Medication 1 MILLIGRAM(S): at 05:34

## 2018-09-04 RX ADMIN — ALBUTEROL 2 PUFF(S): 90 AEROSOL, METERED ORAL at 16:31

## 2018-09-04 RX ADMIN — Medication 1 PUFF(S): at 16:31

## 2018-09-04 RX ADMIN — ENOXAPARIN SODIUM 40 MILLIGRAM(S): 100 INJECTION SUBCUTANEOUS at 09:26

## 2018-09-04 RX ADMIN — LISINOPRIL 5 MILLIGRAM(S): 2.5 TABLET ORAL at 05:34

## 2018-09-04 RX ADMIN — SODIUM CHLORIDE 4 MILLILITER(S): 9 INJECTION INTRAMUSCULAR; INTRAVENOUS; SUBCUTANEOUS at 10:52

## 2018-09-04 RX ADMIN — Medication 1 MILLIGRAM(S): at 17:06

## 2018-09-04 RX ADMIN — MEROPENEM 100 MILLIGRAM(S): 1 INJECTION INTRAVENOUS at 14:35

## 2018-09-04 RX ADMIN — Medication 1 PUFF(S): at 03:22

## 2018-09-04 RX ADMIN — MEROPENEM 100 MILLIGRAM(S): 1 INJECTION INTRAVENOUS at 21:29

## 2018-09-04 RX ADMIN — Medication 250 MILLIGRAM(S): at 17:35

## 2018-09-04 RX ADMIN — Medication 5 MILLIGRAM(S): at 12:24

## 2018-09-04 RX ADMIN — CHLORHEXIDINE GLUCONATE 1 APPLICATION(S): 213 SOLUTION TOPICAL at 08:18

## 2018-09-04 RX ADMIN — SODIUM CHLORIDE 4 MILLILITER(S): 9 INJECTION INTRAMUSCULAR; INTRAVENOUS; SUBCUTANEOUS at 22:13

## 2018-09-04 RX ADMIN — ALBUTEROL 2 PUFF(S): 90 AEROSOL, METERED ORAL at 22:13

## 2018-09-04 RX ADMIN — ALBUTEROL 2 PUFF(S): 90 AEROSOL, METERED ORAL at 10:42

## 2018-09-04 RX ADMIN — ALBUTEROL 2 PUFF(S): 90 AEROSOL, METERED ORAL at 03:23

## 2018-09-04 NOTE — PROGRESS NOTE ADULT - ASSESSMENT
61 y/o F w/ a pmh significant for Sarcoidosis, TIA and PPM who initially presented to the ED w/ a chief complaint of SOB and AMS- intubated and admitted to MICU due to hypoxic/hypercapnic respiratory failure in setting of pulmonary edema and basilar PNA      #Neuro  - AAOx3  - Off sedation, no active issues    #Resp  - s/p extubation yesterday evening now satting well on room air.   - Initially intubated for hypoxic/hypercapnic respiratory failure 2/2 bibasilar consolidations seen on POCUS concerning for PNA  - c/w Albuterol treatments and supplemental O2 prn        #Cardio   - Off vasopressor support. Required Nitro gtt prior to extuabation yesterday, now normotensive off gtt.   - Septic shock from 8/25-8/28, now resolved.  - Hemodynamically stable at this time.       #ID  - Bronch cultures growing S. Aureus now on Vancomycin and Meropenem Day 4  - Can deescalate pending speciation if MSSA to Cefazolin for total 5-7 day course    #Metabolic  - No active issues  -  Monitor Cr, urine output     FEN/GI  - ADAT now on clear liquid diet tolerating well.     #Prophylaxis   - Lovenox 61 y/o F w/ a pmh significant for Sarcoidosis, TIA and PPM who initially presented to the ED w/ a chief complaint of SOB and AMS- intubated and admitted to MICU due to hypoxic/hypercapnic respiratory failure in setting of pulmonary edema and basilar PNA      #Neuro  - AAOx3  - Off sedation, no active issues      #Resp  - s/p extubation yesterday evening now satting well on room air.   - Initially intubated for hypoxic/hypercapnic respiratory failure 2/2 bibasilar consolidations seen on POCUS concerning for PNA  - c/w Albuterol treatments and supplemental O2 prn  - Will restart Prednisone 5mg PO QD for Sarcoidosis      #Cardio   - Off vasopressor support. Required Nitro gtt prior to extubation yesterday, now normotensive off gtt.   - Septic shock from 8/25-8/28, now resolved.  - Hemodynamically stable at this time.       #ID  - Bronch cultures growing MRSA now on Vancomycin and Meropenem Day 4/5      #Metabolic  - No active issues  - Monitor Cr, urine output     FEN/GI  - ADAT now on clear liquid diet tolerating well.     #Prophylaxis   - Lovenox

## 2018-09-04 NOTE — PROGRESS NOTE ADULT - SUBJECTIVE AND OBJECTIVE BOX
SUBJECTIVE: Patient seen and examined at bedside. No acute events over night, pt extubated successfully yesterday- now saturating well on room air. Overall reports feeling well- extremely relieved she was extubated. She says she has a hoarse voice but overall has no active complaints.     CONSTITUTIONAL: No weakness, fevers or chills  EYES/ENT: No visual changes;  No vertigo or throat pain   NECK: No pain or stiffness  RESPIRATORY: No cough, wheezing, hemoptysis; No shortness of breath  CARDIOVASCULAR: No chest pain or palpitations  GASTROINTESTINAL: No abdominal or epigastric pain. No nausea, vomiting, or hematemesis; No diarrhea or constipation. No melena or hematochezia.  GENITOURINARY: No dysuria, frequency or hematuria  NEUROLOGICAL: No numbness or weakness  SKIN: No itching, rashes    OBJECTIVE:    VITAL SIGNS:  ICU Vital Signs Last 24 Hrs  T(C): 36.6 (04 Sep 2018 04:00), Max: 37.5 (03 Sep 2018 08:00)  T(F): 97.8 (04 Sep 2018 04:00), Max: 99.5 (03 Sep 2018 08:00)  HR: 79 (04 Sep 2018 05:00) (79 - 101)  BP: 116/64 (04 Sep 2018 05:00) (12/55 - 150/89)  BP(mean): 77 (04 Sep 2018 05:00) (69 - 105)  ABP: --  ABP(mean): --  RR: 23 (04 Sep 2018 05:00) (14 - 31)  SpO2: 95% (04 Sep 2018 05:00) (94% - 100%)    Mode: CPAP with PS, FiO2: 40, PEEP: 5, PS: 10, MAP: 8.4    09-03 @ 07:01  -  09-04 @ 07:00  --------------------------------------------------------  IN: 444 mL / OUT: 1595 mL / NET: -1151 mL      CAPILLARY BLOOD GLUCOSE      POCT Blood Glucose.: 92 mg/dL (04 Sep 2018 05:41)      PHYSICAL EXAM:    General: NAD  HEENT: NC/AT; PERRL, clear conjunctiva  Neck: supple  Respiratory: coarse breath sounds b/l  Cardiovascular: +S1/S2; RRR  Abdomen: soft, NT/ND; +BS x4  Extremities: WWP, 2+ peripheral pulses b/l; no LE edema  Skin: normal color and turgor; no rash  Neurological:    MEDICATIONS:  MEDICATIONS  (STANDING):  ALBUTerol    90 MICROgram(s) HFA Inhaler 2 Puff(s) Inhalation every 6 hours  chlorhexidine 4% Liquid 1 Application(s) Topical <User Schedule>  clonazePAM Tablet 1 milliGRAM(s) Oral every 12 hours  enoxaparin Injectable 40 milliGRAM(s) SubCutaneous every 24 hours  ipratropium 17 MICROgram(s) HFA Inhaler 1 Puff(s) Inhalation every 6 hours  lisinopril 5 milliGRAM(s) Oral daily  meropenem  IVPB 1000 milliGRAM(s) IV Intermittent every 8 hours  sodium chloride 3%  Inhalation 4 milliLiter(s) Inhalation two times a day  vancomycin  IVPB 1000 milliGRAM(s) IV Intermittent every 24 hours    MEDICATIONS  (PRN):  ondansetron Injectable 4 milliGRAM(s) IV Push every 6 hours PRN Nausea and/or Vomiting      ALLERGIES:  Allergies    No Known Allergies    Intolerances        LABS:                        11.4   12.54 )-----------( 435      ( 04 Sep 2018 03:20 )             35.3     09-04    144  |  103  |  23  ----------------------------<  94  3.8   |  30  |  0.74    Ca    9.9      04 Sep 2018 03:20  Phos  2.5     09-04  Mg     2.9     09-04    TPro  7.4  /  Alb  3.6  /  TBili  0.9  /  DBili  x   /  AST  39<H>  /  ALT  56<H>  /  AlkPhos  75  09-04          RADIOLOGY & ADDITIONAL TESTS: Reviewed.

## 2018-09-04 NOTE — PROVIDER CONTACT NOTE (OTHER) - SITUATION
Patient had 238ml when bladder scanned; 8 hours after gant removal. Patient is NPO except meds and not on any IV fluids.

## 2018-09-04 NOTE — DIETITIAN INITIAL EVALUATION ADULT. - OTHER INFO
Pt seen for critical care LOS.  At this time, pt s/p extubation.  As per RN, diet ordered as Clear, but Soft tray requested.  Unable to obtain nutrition hx at this time.  Family members not present.

## 2018-09-05 DIAGNOSIS — D86.9 SARCOIDOSIS, UNSPECIFIED: ICD-10-CM

## 2018-09-05 DIAGNOSIS — J15.212 PNEUMONIA DUE TO METHICILLIN RESISTANT STAPHYLOCOCCUS AUREUS: ICD-10-CM

## 2018-09-05 DIAGNOSIS — Z79.899 OTHER LONG TERM (CURRENT) DRUG THERAPY: ICD-10-CM

## 2018-09-05 DIAGNOSIS — Z29.9 ENCOUNTER FOR PROPHYLACTIC MEASURES, UNSPECIFIED: ICD-10-CM

## 2018-09-05 DIAGNOSIS — R13.10 DYSPHAGIA, UNSPECIFIED: ICD-10-CM

## 2018-09-05 DIAGNOSIS — G93.41 METABOLIC ENCEPHALOPATHY: ICD-10-CM

## 2018-09-05 LAB
ALBUMIN SERPL ELPH-MCNC: 3.5 G/DL — SIGNIFICANT CHANGE UP (ref 3.3–5)
ALP SERPL-CCNC: 73 U/L — SIGNIFICANT CHANGE UP (ref 40–120)
ALT FLD-CCNC: 49 U/L — HIGH (ref 4–33)
AST SERPL-CCNC: 48 U/L — HIGH (ref 4–32)
BASOPHILS # BLD AUTO: 0.03 K/UL — SIGNIFICANT CHANGE UP (ref 0–0.2)
BASOPHILS NFR BLD AUTO: 0.3 % — SIGNIFICANT CHANGE UP (ref 0–2)
BILIRUB SERPL-MCNC: 0.8 MG/DL — SIGNIFICANT CHANGE UP (ref 0.2–1.2)
BUN SERPL-MCNC: 21 MG/DL — SIGNIFICANT CHANGE UP (ref 7–23)
CALCIUM SERPL-MCNC: 10 MG/DL — SIGNIFICANT CHANGE UP (ref 8.4–10.5)
CHLORIDE SERPL-SCNC: 104 MMOL/L — SIGNIFICANT CHANGE UP (ref 98–107)
CO2 SERPL-SCNC: 28 MMOL/L — SIGNIFICANT CHANGE UP (ref 22–31)
CREAT SERPL-MCNC: 0.63 MG/DL — SIGNIFICANT CHANGE UP (ref 0.5–1.3)
EOSINOPHIL # BLD AUTO: 0.32 K/UL — SIGNIFICANT CHANGE UP (ref 0–0.5)
EOSINOPHIL NFR BLD AUTO: 3 % — SIGNIFICANT CHANGE UP (ref 0–6)
GLUCOSE SERPL-MCNC: 98 MG/DL — SIGNIFICANT CHANGE UP (ref 70–99)
HCT VFR BLD CALC: 35 % — SIGNIFICANT CHANGE UP (ref 34.5–45)
HGB BLD-MCNC: 11.3 G/DL — LOW (ref 11.5–15.5)
IMM GRANULOCYTES # BLD AUTO: 0.03 # — SIGNIFICANT CHANGE UP
IMM GRANULOCYTES NFR BLD AUTO: 0.3 % — SIGNIFICANT CHANGE UP (ref 0–1.5)
LYMPHOCYTES # BLD AUTO: 19 % — SIGNIFICANT CHANGE UP (ref 13–44)
LYMPHOCYTES # BLD AUTO: 2.02 K/UL — SIGNIFICANT CHANGE UP (ref 1–3.3)
MAGNESIUM SERPL-MCNC: 2.7 MG/DL — HIGH (ref 1.6–2.6)
MCHC RBC-ENTMCNC: 30.5 PG — SIGNIFICANT CHANGE UP (ref 27–34)
MCHC RBC-ENTMCNC: 32.3 % — SIGNIFICANT CHANGE UP (ref 32–36)
MCV RBC AUTO: 94.6 FL — SIGNIFICANT CHANGE UP (ref 80–100)
MONOCYTES # BLD AUTO: 0.48 K/UL — SIGNIFICANT CHANGE UP (ref 0–0.9)
MONOCYTES NFR BLD AUTO: 4.5 % — SIGNIFICANT CHANGE UP (ref 2–14)
NEUTROPHILS # BLD AUTO: 7.74 K/UL — HIGH (ref 1.8–7.4)
NEUTROPHILS NFR BLD AUTO: 72.9 % — SIGNIFICANT CHANGE UP (ref 43–77)
NRBC # FLD: 0 — SIGNIFICANT CHANGE UP
PHOSPHATE SERPL-MCNC: 1.7 MG/DL — LOW (ref 2.5–4.5)
PLATELET # BLD AUTO: 438 K/UL — HIGH (ref 150–400)
PMV BLD: 10.1 FL — SIGNIFICANT CHANGE UP (ref 7–13)
POTASSIUM SERPL-MCNC: 3.5 MMOL/L — SIGNIFICANT CHANGE UP (ref 3.5–5.3)
POTASSIUM SERPL-SCNC: 3.5 MMOL/L — SIGNIFICANT CHANGE UP (ref 3.5–5.3)
PROT SERPL-MCNC: 7.4 G/DL — SIGNIFICANT CHANGE UP (ref 6–8.3)
RBC # BLD: 3.7 M/UL — LOW (ref 3.8–5.2)
RBC # FLD: 14.7 % — HIGH (ref 10.3–14.5)
SODIUM SERPL-SCNC: 146 MMOL/L — HIGH (ref 135–145)
WBC # BLD: 10.62 K/UL — HIGH (ref 3.8–10.5)
WBC # FLD AUTO: 10.62 K/UL — HIGH (ref 3.8–10.5)

## 2018-09-05 PROCEDURE — 99233 SBSQ HOSP IP/OBS HIGH 50: CPT | Mod: GC

## 2018-09-05 RX ORDER — SODIUM,POTASSIUM PHOSPHATES 278-250MG
1 POWDER IN PACKET (EA) ORAL
Qty: 0 | Refills: 0 | Status: COMPLETED | OUTPATIENT
Start: 2018-09-05 | End: 2018-09-06

## 2018-09-05 RX ADMIN — Medication 250 MILLIGRAM(S): at 05:20

## 2018-09-05 RX ADMIN — Medication 2 MILLIGRAM(S): at 17:53

## 2018-09-05 RX ADMIN — ALBUTEROL 2 PUFF(S): 90 AEROSOL, METERED ORAL at 22:21

## 2018-09-05 RX ADMIN — Medication 1 TABLET(S): at 10:48

## 2018-09-05 RX ADMIN — ALBUTEROL 2 PUFF(S): 90 AEROSOL, METERED ORAL at 16:12

## 2018-09-05 RX ADMIN — MEROPENEM 100 MILLIGRAM(S): 1 INJECTION INTRAVENOUS at 05:20

## 2018-09-05 RX ADMIN — SODIUM CHLORIDE 4 MILLILITER(S): 9 INJECTION INTRAMUSCULAR; INTRAVENOUS; SUBCUTANEOUS at 22:31

## 2018-09-05 RX ADMIN — Medication 5 MILLIGRAM(S): at 05:20

## 2018-09-05 RX ADMIN — Medication 250 MILLIGRAM(S): at 17:53

## 2018-09-05 RX ADMIN — ENOXAPARIN SODIUM 40 MILLIGRAM(S): 100 INJECTION SUBCUTANEOUS at 10:49

## 2018-09-05 RX ADMIN — Medication 1 PUFF(S): at 22:21

## 2018-09-05 RX ADMIN — ALBUTEROL 2 PUFF(S): 90 AEROSOL, METERED ORAL at 10:08

## 2018-09-05 RX ADMIN — Medication 1 PUFF(S): at 10:08

## 2018-09-05 RX ADMIN — SODIUM CHLORIDE 4 MILLILITER(S): 9 INJECTION INTRAMUSCULAR; INTRAVENOUS; SUBCUTANEOUS at 10:09

## 2018-09-05 RX ADMIN — LISINOPRIL 5 MILLIGRAM(S): 2.5 TABLET ORAL at 05:20

## 2018-09-05 RX ADMIN — MEROPENEM 100 MILLIGRAM(S): 1 INJECTION INTRAVENOUS at 22:10

## 2018-09-05 RX ADMIN — Medication 1 PUFF(S): at 16:12

## 2018-09-05 RX ADMIN — MEROPENEM 100 MILLIGRAM(S): 1 INJECTION INTRAVENOUS at 14:20

## 2018-09-05 RX ADMIN — Medication 1 MILLIGRAM(S): at 05:20

## 2018-09-05 NOTE — SWALLOW BEDSIDE ASSESSMENT ADULT - COMMENTS
Pt was awake and cooperative for a clinical assessment of swallow function this PM. Per charting, pt is a 59 y/o female with PMHx significant for sarcoidosis, PPM/AICD and h/o TIA found obtunded at home with shortness of breath secondary to acute respiratory failure with hypercapnea and hypoxia, found to have bibasilar pulmonary consolidations. Pt required intubation in EDU with subsequent attempts at extubation on 8/29/2018 and 9/1/2018 which were unsuccessful and required reintubation. Pt successfully extubated on 9/3/2018. Recent CXR revealed "The study is limited as portions of the apices are not included. There is blunting of the right costophrenic angle as on the prior study suggestive of a small right pleural effusion. There is suggestion of a trace left pleural effusion. There are left mid and lower lung linear opacities related to atelectasis and/or scarring. Patchy areas of increased density within the right mid to lower lung are noted with interval increase in size and or density since April 30, 2013 likely related to areas of atelectasis and/or scarring or related to the patient's given history of sarcoidosis. "    At the time of today's evaluation, the patient demonstrates a breathy vocal quality with reduced vocal intensity noted.

## 2018-09-05 NOTE — PROGRESS NOTE ADULT - ASSESSMENT
59 y/o F w/ a pmh significant for Sarcoidosis, TIA and PPM who initially presented to the ED w/ a chief complaint of SOB and AMS- intubated and admitted to MICU due to hypoxic/hypercapnic respiratory failure in setting of pulmonary edema and basilar PNA

## 2018-09-05 NOTE — SWALLOW BEDSIDE ASSESSMENT ADULT - ASR SWALLOW ASPIRATION MONITOR
oral hygiene/change of breathing pattern/position upright (90Y)/gurgly voice/cough/fever/pneumonia/throat clearing/upper respiratory infection

## 2018-09-05 NOTE — SWALLOW BEDSIDE ASSESSMENT ADULT - SWALLOW EVAL: DIAGNOSIS
1- functional oral management given mechanical soft, puree, honey thick liquid, nectar thick liquid and thin liquid textures marked by adequate bolus collection, transfer and transport. 2- moderate-severe pharyngeal dysphagia given mechanical soft, puree, honey thick liquid and nectar thick liquids marked by delayed swallow trigger and reduced hyolaryngeal excursion resulting in observed utilization of multiple swallows as well as immediate and delayed reflexive throat clearing all suggestive of pharyngeal stasis and/or penetration/aspiration. 3- severe pharyngeal dysphagia given thin liquids marked by delayed swallow trigger and reduced hyolaryngeal excursion resulting in immediate reflexive coughing suggestive of penetration/aspiration. 4- it should be noted pt reports odynophagia with all presented textures.

## 2018-09-05 NOTE — SWALLOW BEDSIDE ASSESSMENT ADULT - ASR SWALLOW REFERRAL
Registered Dietitian/ENT/RD; to ensure adequate caloric intake ENT; to formally assess vocal mechanism given breathy vocal quality with reduced vocal intensity as well as pt reported odynophagia

## 2018-09-05 NOTE — SWALLOW BEDSIDE ASSESSMENT ADULT - SWALLOW EVAL: CURRENT DIET
NPO pending formal speech and swallow evaluation, per MD order and conversation with RN on unit at time of evaluation

## 2018-09-05 NOTE — PROGRESS NOTE ADULT - SUBJECTIVE AND OBJECTIVE BOX
CHIEF COMPLAINT: Patient is a 63y old  Female who presents with a chief complaint of SOB, AMS (04 Sep 2018 07:16)    Interval Events:      REVIEW OF SYSTEMS:  Constitutional:   Eyes:  ENT:  CV:  Resp:  GI:  :  MSK:  Integumentary:  Neurological:  Psychiatric:  Endocrine:  Hematologic/Lymphatic:  Allergic/Immunologic:  [ ] All other systems negative  [ ] Unable to assess ROS because ________      OBJECTIVE:  ICU Vital Signs Last 24 Hrs  T(C): 36.3 (05 Sep 2018 05:19), Max: 36.8 (04 Sep 2018 08:00)  T(F): 97.4 (05 Sep 2018 05:19), Max: 98.3 (04 Sep 2018 08:00)  HR: 89 (05 Sep 2018 05:19) (84 - 110)  BP: 127/84 (05 Sep 2018 05:19) (111/68 - 148/84)  BP(mean): 106 (04 Sep 2018 20:30) (78 - 106)  ABP: --  ABP(mean): --  RR: 20 (05 Sep 2018 05:19) (18 - 28)  SpO2: 99% (05 Sep 2018 05:19) (89% - 99%)    09-04 @ 07:01  -  09-05 @ 07:00  --------------------------------------------------------  IN: 860 mL / OUT: 950 mL / NET: -90 mL    POCT Blood Glucose.: 128 mg/dL (04 Sep 2018 17:16)    HOSPITAL MEDICATIONS:  MEDICATIONS  (STANDING):  ALBUTerol    90 MICROgram(s) HFA Inhaler 2 Puff(s) Inhalation every 6 hours  clonazePAM Tablet 1 milliGRAM(s) Oral every 12 hours  enoxaparin Injectable 40 milliGRAM(s) SubCutaneous every 24 hours  ipratropium 17 MICROgram(s) HFA Inhaler 1 Puff(s) Inhalation every 6 hours  lisinopril 5 milliGRAM(s) Oral daily  meropenem  IVPB 1000 milliGRAM(s) IV Intermittent every 8 hours  potassium acid phosphate/sodium acid phosphate tablet (K-PHOS No. 2) 1 Tablet(s) Oral three times a day with meals  predniSONE   Tablet 5 milliGRAM(s) Oral daily  sodium chloride 3%  Inhalation 4 milliLiter(s) Inhalation two times a day  vancomycin  IVPB      vancomycin  IVPB 1000 milliGRAM(s) IV Intermittent every 12 hours    MEDICATIONS  (PRN):  ondansetron Injectable 4 milliGRAM(s) IV Push every 6 hours PRN Nausea and/or Vomiting      LABS:                        11.3   10.62 )-----------( 438      ( 05 Sep 2018 05:50 )             35.0     09-05    146<H>  |  104  |  21  ----------------------------<  98  3.5   |  28  |  0.63    Ca    10.0      05 Sep 2018 05:50  Phos  1.7     09-05  Mg     2.7     09-05    TPro  7.4  /  Alb  3.5  /  TBili  0.8  /  DBili  x   /  AST  48<H>  /  ALT  49<H>  /  AlkPhos  73  09-05              MICROBIOLOGY:     RADIOLOGY:  [ ] Reviewed and interpreted by me    PULMONARY FUNCTION TESTS:    EKG: CHIEF COMPLAINT: Patient is a 63y old  Female who presents with a chief complaint of SOB, AMS (04 Sep 2018 07:16)    Interval Events: tx from MICU overnight      REVIEW OF SYSTEMS:  Constitutional: no complaints  CV: denies  Resp: denies  GI: denies  [x] All other systems negative  [ ] Unable to assess ROS because ________      OBJECTIVE:  ICU Vital Signs Last 24 Hrs  T(C): 36.3 (05 Sep 2018 05:19), Max: 36.8 (04 Sep 2018 08:00)  T(F): 97.4 (05 Sep 2018 05:19), Max: 98.3 (04 Sep 2018 08:00)  HR: 89 (05 Sep 2018 05:19) (84 - 110)  BP: 127/84 (05 Sep 2018 05:19) (111/68 - 148/84)  BP(mean): 106 (04 Sep 2018 20:30) (78 - 106)  ABP: --  ABP(mean): --  RR: 20 (05 Sep 2018 05:19) (18 - 28)  SpO2: 99% (05 Sep 2018 05:19) (89% - 99%)    09-04 @ 07:01  -  09-05 @ 07:00  --------------------------------------------------------  IN: 860 mL / OUT: 950 mL / NET: -90 mL    POCT Blood Glucose.: 128 mg/dL (04 Sep 2018 17:16)    HOSPITAL MEDICATIONS:  MEDICATIONS  (STANDING):  ALBUTerol    90 MICROgram(s) HFA Inhaler 2 Puff(s) Inhalation every 6 hours  clonazePAM Tablet 1 milliGRAM(s) Oral every 12 hours  enoxaparin Injectable 40 milliGRAM(s) SubCutaneous every 24 hours  ipratropium 17 MICROgram(s) HFA Inhaler 1 Puff(s) Inhalation every 6 hours  lisinopril 5 milliGRAM(s) Oral daily  meropenem  IVPB 1000 milliGRAM(s) IV Intermittent every 8 hours  potassium acid phosphate/sodium acid phosphate tablet (K-PHOS No. 2) 1 Tablet(s) Oral three times a day with meals  predniSONE   Tablet 5 milliGRAM(s) Oral daily  sodium chloride 3%  Inhalation 4 milliLiter(s) Inhalation two times a day  vancomycin  IVPB      vancomycin  IVPB 1000 milliGRAM(s) IV Intermittent every 12 hours    MEDICATIONS  (PRN):  ondansetron Injectable 4 milliGRAM(s) IV Push every 6 hours PRN Nausea and/or Vomiting      LABS:                        11.3   10.62 )-----------( 438      ( 05 Sep 2018 05:50 )             35.0     09-05    146<H>  |  104  |  21  ----------------------------<  98  3.5   |  28  |  0.63    Ca    10.0      05 Sep 2018 05:50  Phos  1.7     09-05  Mg     2.7     09-05    TPro  7.4  /  Alb  3.5  /  TBili  0.8  /  DBili  x   /  AST  48<H>  /  ALT  49<H>  /  AlkPhos  73  09-05      MICROBIOLOGY:     Culture - Yeast and Fungus (09.01.18 @ 10:07)    Culture - Yeast and Fungus:   CULTURE NEGATIVE FOR MOLDS AFTER 2 DAYS OF INCUBATION.  CANGLA^Candida glabrata  QUANTITY OF GROWTH: FEW  THIS ORGANISM WAS FORMERLY CALLED TORULOPSIS GLABRATA    Specimen Source: BRONCHIAL WASHINGS    Culture - Respiratory with Gram Stain (09.01.18 @ 10:07)    Gram Stain Sputum:   GPCCL Gram Pos Cocci In Clusters  QUANTITY OF BACTERIA SEEN: MODERATE (3+)  GPCPR^Gram Pos Cocci in Pairs  QUANTITY OF BACTERIA SEEN: FEW (2+)  GPR^Gram Positive Rods  QUANTITY OF BACTERIA SEEN: RARE (1+)  YEAST^YEAST.  QUANTITY OF BACTERIA SEEN: FEW (2+)  WBC^White Blood Cells  QNTY CELLS IN GRAM STAIN: MODERATE (3+)    -  Cefazolin: R 8 APOLINAR    -  Ciprofloxacin: S <=1 APOLINAR    -  Clindamycin: R    -  Erythromycin: R >4 APOLINAR    -  Gentamicin: S <=1 APOLINAR    -  Levofloxacin: S <=0.5 APOLINAR    -  Linezolid: S 2 APOLINAR    -  Moxifloxacin(Aerobic): S <=0.5 APOLINAR    -  Oxacillin: R >2 APOLINAR    -  Penicillin: R >8 APOLINAR    -  Rifampin: S <=1 APOLINAR    -  Tetra/Doxy: S <=1 APOLINAR    -  Trimethoprim/Sulfamethoxazole: S <=0.5/9.5 APOLINAR    -  Vancomycin: S 1 APOLINAR    Culture - Respiratory:   STAU^Staphylococcus aureus  QUANTITY OF GROWTH: MANY    Culture - Respiratory:   RESULT CALLED TO / READ BACK: MD [facility]/FRAN  DATE / TIME CALLED: 09/04/18 1104  CALLED BY: STANLEY DAILEY    Specimen Source: BRONCHIAL WASHINGS    Organism Identification: Staph. aureus *MRSA*    Organism: Staph. aureus *MRSA*  QUANTITY OF GROWTH: MANY  OXICILLIN-RESISTANT staphylococci should be regarded as  RESISTANT to ALL other Beta-Lactams regardless of the  in-vitro results obtained.  These include: ALL  Penicillins, Cephalosporins, Amoxicillin-clavulanic  acid, Ticarcillin-clavulanic acid,  Ampicillin-sulbactam, and Imipenem.    Method Type: POSITIVE APOLINAR 29

## 2018-09-05 NOTE — SWALLOW BEDSIDE ASSESSMENT ADULT - PHARYNGEAL PHASE
Decreased laryngeal elevation/Multiple swallows/Throat clear post oral intake/Delayed cough post oral intake/Delayed pharyngeal swallow Cough post oral intake/Throat clear post oral intake/Decreased laryngeal elevation/Multiple swallows/Delayed pharyngeal swallow

## 2018-09-05 NOTE — SWALLOW BEDSIDE ASSESSMENT ADULT - SWALLOW EVAL: RECOMMENDED DIET
1- oral nutrition/hydration is contraindicated at this time 2- consider ST alternative means of nutrition as pt is at an increased nutritional risk. Wears glasses. "Without them, I'm pretty blind."/Severely impaired: cannot locate objects without hearing or touching them or patient nonresponsive.

## 2018-09-06 ENCOUNTER — TRANSCRIPTION ENCOUNTER (OUTPATIENT)
Age: 63
End: 2018-09-06

## 2018-09-06 LAB
BUN SERPL-MCNC: 18 MG/DL — SIGNIFICANT CHANGE UP (ref 7–23)
CALCIUM SERPL-MCNC: 9.4 MG/DL — SIGNIFICANT CHANGE UP (ref 8.4–10.5)
CHLORIDE SERPL-SCNC: 103 MMOL/L — SIGNIFICANT CHANGE UP (ref 98–107)
CO2 SERPL-SCNC: 23 MMOL/L — SIGNIFICANT CHANGE UP (ref 22–31)
CREAT SERPL-MCNC: 0.63 MG/DL — SIGNIFICANT CHANGE UP (ref 0.5–1.3)
GLUCOSE SERPL-MCNC: 91 MG/DL — SIGNIFICANT CHANGE UP (ref 70–99)
HCT VFR BLD CALC: 33.9 % — LOW (ref 34.5–45)
HGB BLD-MCNC: 10.7 G/DL — LOW (ref 11.5–15.5)
MCHC RBC-ENTMCNC: 29.8 PG — SIGNIFICANT CHANGE UP (ref 27–34)
MCHC RBC-ENTMCNC: 31.6 % — LOW (ref 32–36)
MCV RBC AUTO: 94.4 FL — SIGNIFICANT CHANGE UP (ref 80–100)
NRBC # FLD: 0 — SIGNIFICANT CHANGE UP
PLATELET # BLD AUTO: 421 K/UL — HIGH (ref 150–400)
PMV BLD: 9.7 FL — SIGNIFICANT CHANGE UP (ref 7–13)
POTASSIUM SERPL-MCNC: 3.6 MMOL/L — SIGNIFICANT CHANGE UP (ref 3.5–5.3)
POTASSIUM SERPL-SCNC: 3.6 MMOL/L — SIGNIFICANT CHANGE UP (ref 3.5–5.3)
RBC # BLD: 3.59 M/UL — LOW (ref 3.8–5.2)
RBC # FLD: 14.4 % — SIGNIFICANT CHANGE UP (ref 10.3–14.5)
SODIUM SERPL-SCNC: 139 MMOL/L — SIGNIFICANT CHANGE UP (ref 135–145)
VANCOMYCIN TROUGH SERPL-MCNC: 12.8 UG/ML — SIGNIFICANT CHANGE UP (ref 10–20)
WBC # BLD: 9.06 K/UL — SIGNIFICANT CHANGE UP (ref 3.8–10.5)
WBC # FLD AUTO: 9.06 K/UL — SIGNIFICANT CHANGE UP (ref 3.8–10.5)

## 2018-09-06 PROCEDURE — 99233 SBSQ HOSP IP/OBS HIGH 50: CPT | Mod: GC

## 2018-09-06 PROCEDURE — 74230 X-RAY XM SWLNG FUNCJ C+: CPT | Mod: 26

## 2018-09-06 RX ORDER — GABAPENTIN 400 MG/1
300 CAPSULE ORAL DAILY
Qty: 0 | Refills: 0 | Status: DISCONTINUED | OUTPATIENT
Start: 2018-09-06 | End: 2018-09-07

## 2018-09-06 RX ORDER — OXYCODONE HYDROCHLORIDE 5 MG/1
5 TABLET ORAL EVERY 4 HOURS
Qty: 0 | Refills: 0 | Status: DISCONTINUED | OUTPATIENT
Start: 2018-09-06 | End: 2018-09-07

## 2018-09-06 RX ORDER — VANCOMYCIN HCL 1 G
1250 VIAL (EA) INTRAVENOUS EVERY 12 HOURS
Qty: 0 | Refills: 0 | Status: DISCONTINUED | OUTPATIENT
Start: 2018-09-06 | End: 2018-09-07

## 2018-09-06 RX ORDER — HYDROXYCHLOROQUINE SULFATE 200 MG
200 TABLET ORAL DAILY
Qty: 0 | Refills: 0 | Status: DISCONTINUED | OUTPATIENT
Start: 2018-09-06 | End: 2018-09-07

## 2018-09-06 RX ORDER — CLONAZEPAM 1 MG
1 TABLET ORAL
Qty: 0 | Refills: 0 | Status: DISCONTINUED | OUTPATIENT
Start: 2018-09-06 | End: 2018-09-07

## 2018-09-06 RX ORDER — PANTOPRAZOLE SODIUM 20 MG/1
40 TABLET, DELAYED RELEASE ORAL
Qty: 0 | Refills: 0 | Status: DISCONTINUED | OUTPATIENT
Start: 2018-09-06 | End: 2018-09-07

## 2018-09-06 RX ORDER — TIOTROPIUM BROMIDE 18 UG/1
1 CAPSULE ORAL; RESPIRATORY (INHALATION) DAILY
Qty: 0 | Refills: 0 | Status: DISCONTINUED | OUTPATIENT
Start: 2018-09-06 | End: 2018-09-07

## 2018-09-06 RX ORDER — OXYCODONE HYDROCHLORIDE 5 MG/1
1 TABLET ORAL
Qty: 0 | Refills: 0 | COMMUNITY

## 2018-09-06 RX ORDER — CLONAZEPAM 1 MG
1 TABLET ORAL
Qty: 0 | Refills: 0 | COMMUNITY

## 2018-09-06 RX ADMIN — OXYCODONE HYDROCHLORIDE 5 MILLIGRAM(S): 5 TABLET ORAL at 18:21

## 2018-09-06 RX ADMIN — ENOXAPARIN SODIUM 40 MILLIGRAM(S): 100 INJECTION SUBCUTANEOUS at 11:36

## 2018-09-06 RX ADMIN — Medication 166.67 MILLIGRAM(S): at 18:26

## 2018-09-06 RX ADMIN — Medication 1 MILLIGRAM(S): at 18:21

## 2018-09-06 RX ADMIN — ALBUTEROL 2 PUFF(S): 90 AEROSOL, METERED ORAL at 21:59

## 2018-09-06 RX ADMIN — SODIUM CHLORIDE 4 MILLILITER(S): 9 INJECTION INTRAMUSCULAR; INTRAVENOUS; SUBCUTANEOUS at 16:18

## 2018-09-06 RX ADMIN — Medication 2 MILLIGRAM(S): at 06:07

## 2018-09-06 RX ADMIN — ALBUTEROL 2 PUFF(S): 90 AEROSOL, METERED ORAL at 16:15

## 2018-09-06 RX ADMIN — Medication 1 TABLET(S): at 13:11

## 2018-09-06 RX ADMIN — GABAPENTIN 300 MILLIGRAM(S): 400 CAPSULE ORAL at 17:03

## 2018-09-06 RX ADMIN — Medication 1 TABLET(S): at 17:05

## 2018-09-06 RX ADMIN — Medication 250 MILLIGRAM(S): at 07:32

## 2018-09-06 RX ADMIN — OXYCODONE HYDROCHLORIDE 5 MILLIGRAM(S): 5 TABLET ORAL at 18:51

## 2018-09-06 RX ADMIN — ALBUTEROL 2 PUFF(S): 90 AEROSOL, METERED ORAL at 03:43

## 2018-09-06 RX ADMIN — Medication 1 PUFF(S): at 03:43

## 2018-09-06 NOTE — PROGRESS NOTE ADULT - SUBJECTIVE AND OBJECTIVE BOX
CHIEF COMPLAINT: Patient is a 63y old  Female who presents with a chief complaint of SOB, AMS (05 Sep 2018 07:58)    Interval Events:      REVIEW OF SYSTEMS:  Constitutional:   Eyes:  ENT:  CV:  Resp:  GI:  :  MSK:  Integumentary:  Neurological:  Psychiatric:  Endocrine:  Hematologic/Lymphatic:  Allergic/Immunologic:  [ ] All other systems negative  [ ] Unable to assess ROS because ________      OBJECTIVE:  ICU Vital Signs Last 24 Hrs  T(C): 36.4 (06 Sep 2018 06:04), Max: 36.8 (05 Sep 2018 22:08)  T(F): 97.5 (06 Sep 2018 06:04), Max: 98.3 (05 Sep 2018 22:08)  HR: 88 (06 Sep 2018 06:04) (81 - 99)  BP: 131/77 (06 Sep 2018 06:04) (113/90 - 131/77)  BP(mean): --  ABP: --  ABP(mean): --  RR: 19 (06 Sep 2018 06:04) (18 - 19)  SpO2: 100% (06 Sep 2018 06:04) (96% - 100%)    09-05 @ 07:01  -  09-06 @ 07:00  --------------------------------------------------------  IN: 0 mL / OUT: 150 mL / NET: -150 mL    POCT Blood Glucose.: 111 mg/dL (05 Sep 2018 12:14)    HOSPITAL MEDICATIONS:  MEDICATIONS  (STANDING):  ALBUTerol    90 MICROgram(s) HFA Inhaler 2 Puff(s) Inhalation every 6 hours  enoxaparin Injectable 40 milliGRAM(s) SubCutaneous every 24 hours  ipratropium 17 MICROgram(s) HFA Inhaler 1 Puff(s) Inhalation every 6 hours  lisinopril 5 milliGRAM(s) Oral daily  LORazepam   Injectable 2 milliGRAM(s) IV Push two times a day  potassium acid phosphate/sodium acid phosphate tablet (K-PHOS No. 2) 1 Tablet(s) Oral three times a day with meals  predniSONE   Tablet 5 milliGRAM(s) Oral daily  sodium chloride 3%  Inhalation 4 milliLiter(s) Inhalation two times a day  vancomycin  IVPB      vancomycin  IVPB 1000 milliGRAM(s) IV Intermittent every 12 hours    MEDICATIONS  (PRN):  ondansetron Injectable 4 milliGRAM(s) IV Push every 6 hours PRN Nausea and/or Vomiting      LABS:                        10.7   9.06  )-----------( 421      ( 06 Sep 2018 06:00 )             33.9     09-06    139  |  103  |  18  ----------------------------<  91  3.6   |  23  |  0.63    Ca    9.4      06 Sep 2018 06:00  Phos  1.7     09-05  Mg     2.7     09-05    TPro  7.4  /  Alb  3.5  /  TBili  0.8  /  DBili  x   /  AST  48<H>  /  ALT  49<H>  /  AlkPhos  73  09-05              MICROBIOLOGY:     RADIOLOGY:  [ ] Reviewed and interpreted by me    PULMONARY FUNCTION TESTS:    EKG: CHIEF COMPLAINT: Patient is a 63y old  Female who presents with a chief complaint of SOB, AMS (05 Sep 2018 07:58)    Interval Events: none overnight      REVIEW OF SYSTEMS:  Constitutional: no complaints  CV: denies  Resp: denies  GI: denies  [x] All other systems negative  [ ] Unable to assess ROS because ________      OBJECTIVE:  ICU Vital Signs Last 24 Hrs  T(C): 36.4 (06 Sep 2018 06:04), Max: 36.8 (05 Sep 2018 22:08)  T(F): 97.5 (06 Sep 2018 06:04), Max: 98.3 (05 Sep 2018 22:08)  HR: 88 (06 Sep 2018 06:04) (81 - 99)  BP: 131/77 (06 Sep 2018 06:04) (113/90 - 131/77)  BP(mean): --  ABP: --  ABP(mean): --  RR: 19 (06 Sep 2018 06:04) (18 - 19)  SpO2: 100% (06 Sep 2018 06:04) (96% - 100%)    09-05 @ 07:01  -  09-06 @ 07:00  --------------------------------------------------------  IN: 0 mL / OUT: 150 mL / NET: -150 mL    POCT Blood Glucose.: 111 mg/dL (05 Sep 2018 12:14)    HOSPITAL MEDICATIONS:  MEDICATIONS  (STANDING):  ALBUTerol    90 MICROgram(s) HFA Inhaler 2 Puff(s) Inhalation every 6 hours  enoxaparin Injectable 40 milliGRAM(s) SubCutaneous every 24 hours  ipratropium 17 MICROgram(s) HFA Inhaler 1 Puff(s) Inhalation every 6 hours  lisinopril 5 milliGRAM(s) Oral daily  LORazepam   Injectable 2 milliGRAM(s) IV Push two times a day  potassium acid phosphate/sodium acid phosphate tablet (K-PHOS No. 2) 1 Tablet(s) Oral three times a day with meals  predniSONE   Tablet 5 milliGRAM(s) Oral daily  sodium chloride 3%  Inhalation 4 milliLiter(s) Inhalation two times a day  vancomycin  IVPB      vancomycin  IVPB 1000 milliGRAM(s) IV Intermittent every 12 hours    MEDICATIONS  (PRN):  ondansetron Injectable 4 milliGRAM(s) IV Push every 6 hours PRN Nausea and/or Vomiting      LABS:                        10.7   9.06  )-----------( 421      ( 06 Sep 2018 06:00 )             33.9     09-06    139  |  103  |  18  ----------------------------<  91  3.6   |  23  |  0.63    Ca    9.4      06 Sep 2018 06:00

## 2018-09-06 NOTE — DISCHARGE NOTE ADULT - ADDITIONAL INSTRUCTIONS
Follow up with your pulmonologist in the next 2 weeks  Follow up with Dr. AMANDA Pugh, your ENT, in the next 2 weeks

## 2018-09-06 NOTE — SWALLOW VFSS/MBS ASSESSMENT ADULT - DEMONSTRATES NEED FOR REFERRAL TO ANOTHER SERVICE
Registered Dietitian/To facilitate adequate daily caloric intake; ENT to assess vocal mechanism due to breathy vocal quality.

## 2018-09-06 NOTE — SWALLOW VFSS/MBS ASSESSMENT ADULT - ADDITIONAL RECOMMENDATIONS
This department to continue to follow during this admission as schedule permits. Continued swallowing therapy is recommended upon discharge from [facility] (rehab vs home care vs [facility] outpatient clinic -[phone]).

## 2018-09-06 NOTE — DISCHARGE NOTE ADULT - PATIENT PORTAL LINK FT
You can access the [facility] Portal, offered by [facility], by registering with the following website: [email or web address]

## 2018-09-06 NOTE — DISCHARGE NOTE ADULT - CARE PROVIDER_API CALL
Rustam Pugh  [place]  Phone: (   )    -  Fax: (   )    - Rustam Pugh  [place]  Phone: (   )    -  Fax: (   )    -    [facility]  Phone: (   )    -  Fax: (   )    -

## 2018-09-06 NOTE — SWALLOW VFSS/MBS ASSESSMENT ADULT - ROSENBEK'S PENETRATION ASPIRATION SCALE
(1) no aspiration, contrast does not enter airway (6) contrast passes glottis, no subglottic residue remains (aspiration) (8) contrast passes glottis, visible subglottic residue remains, absent patient response (aspiration)

## 2018-09-06 NOTE — SWALLOW VFSS/MBS ASSESSMENT ADULT - NS SWALLOW VFSS REC ASPIR MON
fever/oral hygiene/change of breathing pattern/position upright (90Y)/cough/gurgly voice/pneumonia/throat clearing/upper respiratory infection

## 2018-09-06 NOTE — SWALLOW VFSS/MBS ASSESSMENT ADULT - ORAL PHASE
Delayed oral transit time/Reduced anterior - posterior transport Reduced anterior - posterior transport/Delayed oral transit time

## 2018-09-06 NOTE — DISCHARGE NOTE ADULT - MEDICATION SUMMARY - MEDICATIONS TO TAKE
I will START or STAY ON the medications listed below when I get home from the hospital:    predniSONE 5 mg oral tablet  -- 1 tab(s) by mouth once a day  -- Indication: For Sarcoidosis    oxyCODONE 30 mg oral tablet  -- 1 tab(s) by mouth every 6 hours, As Needed  -- Indication: For Sarcoidosis    lisinopril 5 mg oral tablet  -- 1 tab(s) by mouth once a day  -- Indication: For Hypertension    clonazePAM 1 mg oral tablet  -- 1 tab(s) by mouth every 12 hours, As Needed  -- Indication: For Anxiety    gabapentin enacarbil 300 mg oral tablet, extended release  -- 1 tab(s) by mouth once a day  -- Indication: For Sarcoidosis    hydroxychloroquine 200 mg oral tablet  -- 1 tab(s) by mouth once a day  -- Indication: For Sarcoidosis    zolpidem 10 mg oral tablet  -- 1 tab(s) by mouth once a day (at bedtime)  -- Indication: For Insomnia    Spiriva 18 mcg inhalation capsule  -- 1 cap(s) inhaled once a day  -- Indication: For Respiratory failure    Ventolin HFA 90 mcg/inh inhalation aerosol  -- 2 puff(s) inhaled 4 times a day, As Needed  -- Indication: For Respiratory failure    Voltaren 1% topical gel  -- Indication: For Sarcoidosis    dexlansoprazole 60 mg oral delayed release capsule  -- 1 cap(s) by mouth once a day  -- Indication: For GERD

## 2018-09-06 NOTE — DISCHARGE NOTE ADULT - INSTRUCTIONS
Soft diet with thickened fluids (to nectar-like consistency)  Will need to follow up with speech therapy office to reevaluate further on advancing diet back to regular

## 2018-09-06 NOTE — SWALLOW VFSS/MBS ASSESSMENT ADULT - DIAGNOSTIC IMPRESSIONS
1. The patient demonstrated a mild oral dysphagia for puree, solid, honey thick, nectar thick, and thin liquid textures marked by delayed bolus collection, transfer, and AP transit.   2. The patient demonstrated a mild pharyngeal dysphagia for puree, solid, and honey thick liquid textures marked by delayed pharyngeal swallow trigger with reduced base of tongue retraction, reduced epiglottic deflection, reduced hyolaryngeal elevation, and adequate pharyngeal contractility. Trace stasis noted along the base of tongue, mild stasis noted in the vallecula, and trace stasis noted along the posterior pharyngeal wall which reduced with spontaneous re-swallows. No laryngeal penetration/aspiration observed.   3. The patient demonstrated a mild-moderate pharyngeal dysphagia for nectar thick liquid textures marked by delayed pharyngeal swallow trigger with reduced base of tongue retraction, reduced epiglottic deflection, reduced hyolaryngeal elevation, adequate pharyngeal contractility, and incomplete closure of the laryngeal vestibule resulting in laryngeal penetration with subsequent aspiration on larger cup sips. Delayed cough response indicative of reduced laryngo-pharyngeal sensation. A chin tuck was successful in providing adequate airway protection and in eliminating laryngeal penetration/aspiration. Trace stasis noted along the base of tongue, in the vallecula, and along the posterior pharyngeal wall.  4. The patient demonstrated a moderate-severe pharyngeal dysphagia for thin liquid textures marked by delayed pharyngeal swallow trigger with reduced base of tongue retraction, reduced epiglottic deflection, reduced hyolaryngeal elevation, adequate pharyngeal contractility, and incomplete closure of the laryngeal vestibule resulting in silent aspiration. Absent patient response indicative of reduced laryngo-pharyngeal sensation. Compensatory strategies (i.e. chin tuck) were unsuccessful in providing adequate airway protection, as laryngeal penetration without retrieval

## 2018-09-06 NOTE — DISCHARGE NOTE ADULT - PLAN OF CARE
resolved Completed IV antibiotics while in the hospital.  Weaned off oxygen.  Please follow up with your pulmonologist within 1-2 weeks of discharge. Continue prednisone and plaquenil.  Follow up with your pulmonologist. Since you were intubated multiple times, your voice is hoarse. It has gotten better.  You will also need to keep a soft diet with nectar thick fluids for now.  Please call the speech office at [phone] to schedule an appointment for repeat swallow eval to ensure that it is safe to advance back to regular diet and thin liquids.

## 2018-09-06 NOTE — SWALLOW VFSS/MBS ASSESSMENT ADULT - RECOMMENDED CONSISTENCY
1. Dysphagia 3 with nectar thick liquids with utilization of a chin tuck during deglutition   2. Chin tuck during deglutition of all cup sips of nectar thick liquids  3. Small bites, small sips   4. Upright positioning during all meals was noted when the patient performed a chin tuck during deglutition of thin liquid textures.     Recommendations:  1. Dysphagia 3 with nectar thick liquids with utilization of a chin tuck during deglutition   2. Chin tuck during deglutition of all cup sips of nectar thick liquids  3. Small bites, small sips   4. Upright positioning during all meals

## 2018-09-06 NOTE — SWALLOW VFSS/MBS ASSESSMENT ADULT - RECOMMENDED FEEDING/EATING TECHNIQUES
allow for swallow between intakes/oral hygiene/crush medication (when feasible)/position upright (90 degrees)/hard swallow w/ each bite or sip/maintain upright posture during/after eating for 30 mins/no straws/small sips/bites/provide rest periods between swallows/tuck chin

## 2018-09-06 NOTE — DISCHARGE NOTE ADULT - HOME CARE AGENCY
[facility] [phone]: For Visiting Nurse Services. The 1st Visiting Nurse Visit is for Saturday 9/8/18. The Nurse will call to arrange the Visit time.

## 2018-09-06 NOTE — DISCHARGE NOTE ADULT - CARE PLAN
Principal Discharge DX:	MRSA pneumonia  Goal:	resolved  Assessment and plan of treatment:	Completed IV antibiotics while in the hospital.  Weaned off oxygen.  Please follow up with your pulmonologist within 1-2 weeks of discharge.  Secondary Diagnosis:	Sarcoidosis  Assessment and plan of treatment:	Continue prednisone and plaquenil.  Follow up with your pulmonologist.  Secondary Diagnosis:	Dysphagia  Assessment and plan of treatment:	Since you were intubated multiple times, your voice is hoarse. It has gotten better.  You will also need to keep a soft diet with nectar thick fluids for now.  Please call the speech office at [phone] to schedule an appointment for repeat swallow eval to ensure that it is safe to advance back to regular diet and thin liquids.

## 2018-09-06 NOTE — DISCHARGE NOTE ADULT - PROVIDER TOKENS
FREE:[LAST:[Keaton],FIRST:[Rustam],PHONE:[(   )    -],FAX:[(   )    -],ADDRESS:[[place]]] FREE:[LAST:[Keaton],FIRST:[Rustam],PHONE:[(   )    -],FAX:[(   )    -],ADDRESS:[[place]]],FREE:[LAST:[Nick],FIRST:[Federico],PHONE:[(   )    -],FAX:[(   )    -],ADDRESS:[[place]]]

## 2018-09-06 NOTE — DISCHARGE NOTE ADULT - HOSPITAL COURSE
61 y/o F w/ a pmh significant for Sarcoidosis (Dx 1986) w/ recent PPM placement May 2018 presenting to the ED w/ a chief complaint of SOB. Pt was in her usual state of health up until day of admission where she experienced sudden onset SOB and unresponsiveness. EMS was called by family and she was noted to be hypoxic 70's and hypotensive SBP 90 en route to ED. In the ED, pt was obtunded and hypoxic despite being on non rebreather. VBG 6.97/110/27/15 w/ CXR showing evidence of pulmonary edema. She was intubated for hypercapnic respiratory failure and admitted to MICU for further management and care.    POCUS showed evidence of bibasilar consolidations concerning for possible PNA. She completed a 7 day course of Vancomycin and Zosyn. Pt was extubated and re-intubated x 2 in setting of significant stridor and respiratory distress likely 2/2 flash pulmonary edema. Following reintubation on September 1st, there was concern for aspiration PNA for which bronchoscopy was performed and cultures grew MRSA. She was started on a course of Vancomycin and Meropenem (Day 4/7). On 9/3 pt was successfully extubated to nasal cannula and then room air. She was hemodynamically stable for transfer to RCU.     In the RCU pt continued to do well, completed course of antibiotics.  PT recs home PT.      dispo: to home 61 y/o F w/ a pmh significant for Sarcoidosis (Dx 1986) w/ recent PPM placement May 2018 presenting to the ED w/ a chief complaint of SOB. Pt was in her usual state of health up until day of admission where she experienced sudden onset SOB and unresponsiveness. EMS was called by family and she was noted to be hypoxic 70's and hypotensive SBP 90 en route to ED. In the ED, pt was obtunded and hypoxic despite being on non rebreather. VBG 6.97/110/27/15 w/ CXR showing evidence of pulmonary edema. She was intubated for hypercapnic respiratory failure and admitted to MICU for further management and care, and septic shock.     POCUS showed evidence of bibasilar consolidations concerning for possible PNA. She completed a 7 day course of Vancomycin and Zosyn. Pt was extubated and re-intubated x 2 in setting of significant stridor and respiratory distress likely 2/2 flash pulmonary edema. Following reintubation on September 1st, there was concern for aspiration PNA for which bronchoscopy was performed and cultures grew MRSA. She was started on a course of Vancomycin and Meropenem (Day 4/7). On 9/3 pt was successfully extubated to nasal cannula and then room air. She was hemodynamically stable for transfer to RCU.     In the RCU pt continued to do well, completed course of antibiotics.  PT recs home PT.      dispo: to home

## 2018-09-06 NOTE — DISCHARGE NOTE ADULT - DURABLE MEDICAL EQUIPMENT AGENCY
[facility] [phone]: A Rollator Walker was ordered & will be delivered to your Hospital Room to bring home.

## 2018-09-06 NOTE — PROGRESS NOTE ADULT - NEUROLOGICAL DETAILS
alert and oriented x 3/responds to pain/responds to verbal commands
responds to pain/responds to verbal commands/alert and oriented x 3

## 2018-09-07 VITALS — OXYGEN SATURATION: 99 %

## 2018-09-07 PROCEDURE — 99238 HOSP IP/OBS DSCHRG MGMT 30/<: CPT

## 2018-09-07 RX ORDER — LISINOPRIL 2.5 MG/1
1 TABLET ORAL
Qty: 30 | Refills: 0 | OUTPATIENT
Start: 2018-09-07 | End: 2018-10-06

## 2018-09-07 RX ORDER — CHLORHEXIDINE GLUCONATE 213 G/1000ML
1 SOLUTION TOPICAL
Qty: 0 | Refills: 0 | Status: DISCONTINUED | OUTPATIENT
Start: 2018-09-07 | End: 2018-09-07

## 2018-09-07 RX ADMIN — ALBUTEROL 2 PUFF(S): 90 AEROSOL, METERED ORAL at 09:32

## 2018-09-07 RX ADMIN — Medication 1 MILLIGRAM(S): at 17:58

## 2018-09-07 RX ADMIN — Medication 166.67 MILLIGRAM(S): at 06:07

## 2018-09-07 RX ADMIN — ALBUTEROL 2 PUFF(S): 90 AEROSOL, METERED ORAL at 03:36

## 2018-09-07 RX ADMIN — Medication 1 MILLIGRAM(S): at 06:08

## 2018-09-07 RX ADMIN — Medication 200 MILLIGRAM(S): at 14:19

## 2018-09-07 RX ADMIN — SODIUM CHLORIDE 4 MILLILITER(S): 9 INJECTION INTRAMUSCULAR; INTRAVENOUS; SUBCUTANEOUS at 09:33

## 2018-09-07 RX ADMIN — Medication 5 MILLIGRAM(S): at 06:08

## 2018-09-07 RX ADMIN — ENOXAPARIN SODIUM 40 MILLIGRAM(S): 100 INJECTION SUBCUTANEOUS at 10:31

## 2018-09-07 RX ADMIN — ALBUTEROL 2 PUFF(S): 90 AEROSOL, METERED ORAL at 15:35

## 2018-09-07 RX ADMIN — PANTOPRAZOLE SODIUM 40 MILLIGRAM(S): 20 TABLET, DELAYED RELEASE ORAL at 06:07

## 2018-09-07 RX ADMIN — LISINOPRIL 5 MILLIGRAM(S): 2.5 TABLET ORAL at 06:08

## 2018-09-07 RX ADMIN — GABAPENTIN 300 MILLIGRAM(S): 400 CAPSULE ORAL at 14:19

## 2018-09-07 RX ADMIN — TIOTROPIUM BROMIDE 1 CAPSULE(S): 18 CAPSULE ORAL; RESPIRATORY (INHALATION) at 09:33

## 2018-09-07 NOTE — PROGRESS NOTE ADULT - RS GEN PE MLT RESP DETAILS PC
airway patent/breath sounds equal
airway patent/breath sounds equal
good air movement/airway patent/clear to auscultation bilaterally/breath sounds equal

## 2018-09-07 NOTE — PROGRESS NOTE ADULT - PROBLEM SELECTOR PLAN 2
- cont prednisone daily  - plaquenil restarted
- cont prednisone daily
- cont prednisone daily  - plaquenil restarted

## 2018-09-07 NOTE — PROGRESS NOTE ADULT - ASSESSMENT
59 y/o F w/ a pmh significant for Sarcoidosis, TIA and PPM who initially presented to the ED w/ a chief complaint of SOB and AMS- intubated and admitted to MICU due to hypoxic/hypercapnic respiratory failure in setting of pulmonary edema and basilar MRSA PNA. Septic shock was present on admission.

## 2018-09-07 NOTE — PROGRESS NOTE ADULT - PROBLEM SELECTOR PLAN 3
- passed cine  - dysphgia 3 with nectar thick
- failed S&S today  - for cine in am  - monitor voice hoarseness
- passed cine  - dysphgia 3 with nectar thick

## 2018-09-07 NOTE — PROGRESS NOTE ADULT - [facility] HE PE MLT D E PC
no rub/regular rate and rhythm/no murmur
no rub/regular rate and rhythm/no murmur
regular rate and rhythm

## 2018-09-07 NOTE — PROGRESS NOTE ADULT - PROBLEM SELECTOR PLAN 1
- sputum culture from most recent bronch with MRSA  - cont vanco thru friday (7 days total)  - completed jimy for empiric coverage (5 days total)  - s/p extubation  - tolerating room air  - cont metaneb for secretions
- sputum culture from most recent bronch with MRSA  - cont vanco thru friday (7 days total)  - cont jimy for empiric coverage through today (5 days total)  - s/p extubation  - tolerating room air  - cont metaneb for secretions
- sputum culture from most recent bronch with MRSA  - cont vanco thru friday (7 days total)  - completed jimy for empiric coverage (5 days total)  - s/p extubation  - tolerating room air

## 2018-09-07 NOTE — PROGRESS NOTE ADULT - SUBJECTIVE AND OBJECTIVE BOX
CHIEF COMPLAINT:    Interval Events:    REVIEW OF SYSTEMS:  Constitutional:   Eyes:  ENT:  CV:  Resp:  GI:  :  MSK:  Integumentary:  Neurological:  Psychiatric:  Endocrine:  Hematologic/Lymphatic:  Allergic/Immunologic:  [ ] All other systems negative  [ ] Unable to assess ROS because ________    OBJECTIVE:  ICU Vital Signs Last 24 Hrs  T(C): 36.7 (07 Sep 2018 06:00), Max: 36.7 (06 Sep 2018 15:48)  T(F): 98.1 (07 Sep 2018 06:00), Max: 98.1 (07 Sep 2018 06:00)  HR: 85 (07 Sep 2018 09:36) (80 - 94)  BP: 122/57 (07 Sep 2018 06:00) (115/66 - 122/57)  BP(mean): --  ABP: --  ABP(mean): --  RR: 19 (07 Sep 2018 09:36) (18 - 19)  SpO2: 98% (07 Sep 2018 09:36) (97% - 100%)        CAPILLARY BLOOD GLUCOSE      POCT Blood Glucose.: 111 mg/dL (05 Sep 2018 12:14)      PHYSICAL EXAM:  General:   HEENT:   Lymph Nodes:  Neck:   Respiratory:   Cardiovascular:   Abdomen:   Extremities:   Skin:   Neurological:  Psychiatry:    HOSPITAL MEDICATIONS:  MEDICATIONS  (STANDING):  ALBUTerol    90 MICROgram(s) HFA Inhaler 2 Puff(s) Inhalation every 6 hours  chlorhexidine 4% Liquid 1 Application(s) Topical <User Schedule>  clonazePAM Tablet 1 milliGRAM(s) Oral two times a day  enoxaparin Injectable 40 milliGRAM(s) SubCutaneous every 24 hours  gabapentin 300 milliGRAM(s) Oral daily  hydroxychloroquine 200 milliGRAM(s) Oral daily  lisinopril 5 milliGRAM(s) Oral daily  pantoprazole    Tablet 40 milliGRAM(s) Oral before breakfast  predniSONE   Tablet 5 milliGRAM(s) Oral daily  sodium chloride 3%  Inhalation 4 milliLiter(s) Inhalation two times a day  tiotropium 18 MICROgram(s) Capsule 1 Capsule(s) Inhalation daily  vancomycin  IVPB 1250 milliGRAM(s) IV Intermittent every 12 hours    MEDICATIONS  (PRN):  ondansetron Injectable 4 milliGRAM(s) IV Push every 6 hours PRN Nausea and/or Vomiting  oxyCODONE    IR 5 milliGRAM(s) Oral every 4 hours PRN moderate and severe pain      LABS:                        10.7   9.06  )-----------( 421      ( 06 Sep 2018 06:00 )             33.9     09-06    139  |  103  |  18  ----------------------------<  91  3.6   |  23  |  0.63    Ca    9.4      06 Sep 2018 06:00                MICROBIOLOGY:     RADIOLOGY:  [ ] Reviewed and interpreted by me    PULMONARY FUNCTION TESTS:    EKG: CHIEF COMPLAINT: Patient is a 63y old  Female who presents with a chief complaint of SOB, AMS (07 Sep 2018 10:09)    Interval Events: No acute events overnight    OBJECTIVE:  ICU Vital Signs Last 24 Hrs  T(C): 36.7 (07 Sep 2018 06:00), Max: 36.7 (06 Sep 2018 15:48)  T(F): 98.1 (07 Sep 2018 06:00), Max: 98.1 (07 Sep 2018 06:00)  HR: 85 (07 Sep 2018 09:36) (80 - 94)  BP: 122/57 (07 Sep 2018 06:00) (115/66 - 122/57)  BP(mean): --  ABP: --  ABP(mean): --  RR: 19 (07 Sep 2018 09:36) (18 - 19)  SpO2: 98% (07 Sep 2018 09:36) (97% - 100%)        CAPILLARY BLOOD GLUCOSE      POCT Blood Glucose.: 111 mg/dL (05 Sep 2018 12:14)    HOSPITAL MEDICATIONS:  MEDICATIONS  (STANDING):  ALBUTerol    90 MICROgram(s) HFA Inhaler 2 Puff(s) Inhalation every 6 hours  chlorhexidine 4% Liquid 1 Application(s) Topical <User Schedule>  clonazePAM Tablet 1 milliGRAM(s) Oral two times a day  enoxaparin Injectable 40 milliGRAM(s) SubCutaneous every 24 hours  gabapentin 300 milliGRAM(s) Oral daily  hydroxychloroquine 200 milliGRAM(s) Oral daily  lisinopril 5 milliGRAM(s) Oral daily  pantoprazole    Tablet 40 milliGRAM(s) Oral before breakfast  predniSONE   Tablet 5 milliGRAM(s) Oral daily  sodium chloride 3%  Inhalation 4 milliLiter(s) Inhalation two times a day  tiotropium 18 MICROgram(s) Capsule 1 Capsule(s) Inhalation daily  vancomycin  IVPB 1250 milliGRAM(s) IV Intermittent every 12 hours    MEDICATIONS  (PRN):  ondansetron Injectable 4 milliGRAM(s) IV Push every 6 hours PRN Nausea and/or Vomiting  oxyCODONE    IR 5 milliGRAM(s) Oral every 4 hours PRN moderate and severe pain      LABS:                        10.7   9.06  )-----------( 421      ( 06 Sep 2018 06:00 )             33.9     09-06    139  |  103  |  18  ----------------------------<  91  3.6   |  23  |  0.63    Ca    9.4      06 Sep 2018 06:00                MICROBIOLOGY:     RADIOLOGY:  [ ] Reviewed and interpreted by me    PULMONARY FUNCTION TESTS:    EKG:

## 2018-09-07 NOTE — PROGRESS NOTE ADULT - PROBLEM SELECTOR PLAN 4
- pt is a/o x3, but slow to respond and seems forgetful, likely 2/2 hypoxia and hypercapnia  - monitor for improvement
- pt is a/o x3, but slow to respond and seems forgetful, likely 2/2 hypoxia and hypercapnia  - monitor for improvement
-Resolved

## 2018-09-07 NOTE — PROGRESS NOTE ADULT - ATTENDING COMMENTS
1. Acute hypoxemic respiratory failure due to bacterial pneumonia. Pt became with stridor yesterday  after extubation requiring reintubation. Pt with ?diastolic chf as bp was high  and new b lines seen on lung ultrasound. Today with air leak. Will try  weaning again with Precedex to help with anxiety. May need  HTN meds.    2. Continue hydrocortisone for sarcoidosis.    cc time 35 min
60 year old female with a significant pmhx of sarcoidosis, cardiac sarcoid s/p PPM/AICD who comes in initially with respiratory distress, found to have bibasilar focal consolidations suggestive of pneumonia, was treated with a 7 day course of antibiotics and was extubated one but reintubated twice due to respiratory distress. Bronch cultures growing staph aureus but no sensitivities. Will stop meropenem and continue vanco until sensitives are obtained. Patient is alert and oriented and is writing that she wants to try to take the ET tube out. Will attempt extubation again today with labetalol for BP management, Lasix for diuresis, and home does of anxiolytic.
Critically ill on vent with PNA When sedated, pt with adequate performance on SBT Immediately post extubation attempt, pt developed respiratory distress with inspiratory stridor and required reintubation Vocal cords had normal anatomy Increased B line density noted post reintubation    Frequent bedside visits with therapy change today.   Crit Care Time Today 35 min+    Patient examined and case reviewed in detail on bedside rounds
Hypoxic respiratory failure s/p 2 unsuccessful extubations, last one likely due to flash pulmonary edema and aspiration.  Trial of PST tomorrow once BP better controlled. Restarting BP meds and diuretics. PST on sedation with likely transition to NIV post extubation.  Continue abx.
Pt transferred from the MICU  acute respiratory failure with hypoxia and hyercapnea, requiring several intubations MRSA pneumonia, underlying pulmonary sarcoid  Currently do much better  Stable on nasal cannula 2lpm  on vanco for mrsa, also covered with imipenem for possible aspiration during acute episode
completed abx for mrsa pneumonia.  stable on room air.    stable for discharge  pt will follow up with her pulmonologist at [facility] as well as her ENT, Dr. jane weiss
feeling much better.  completing vanco tomorrow for mrsa pna  check room air sat with ambulation
1. Acute hypoxemic respiratory failure from bacterial pneumonia and acute diastolic CHF. Pt was weaning on PS 5 CPAP 5 but vomited. was planning to give trial of extubation on BiPAP. On hold since pt vomited.  abd soft. ? h/o sarcoidosis, cutaneous. On steroids. Good air leak .   2. HTN Start lisinopril 5 mg daily. Labetolol prn IVP.  3. Anxiety. On Precedex. ? start  Xanax.  4. Diastolic CHF. Started on ace. Give Lasix today.        CC time 35 min
severe sarcoid with cardiac involvement and prob PNA with hypercapnea/extubated yesterday and been on NIV, we will try to get her off the NIV to nasal cannula/guarded prognosis but she is improving
Critically ill on vent with PNA Fails SBT today    Frequent bedside visits with therapy change today.   Crit Care Time Today 35 min+    Patient examined and case reviewed in detail on bedside rounds
60 year old female with a significant pmhx of sarcoidosis, cardiac sarcoid s/p PPM/AICD who comes in initially with respiratory distress, found to have bibasilar focal consolidations suggestive of pneumonia, was treated with a 7 day course of antibiotics and was extubated one but reintubated due to respiratory distress, and extubated today and was found to have hypoxic respiratory failure and subsequently reintubated. She either aspirated or had flash pulmonary edema (sbp was >200 with scattered B-lines bilaterally). Bronchoscopy after intubation showed edematous and collapsible airway. Restarted on antibiotics as bronchoscopy cultures are growing gram positive, gram negative, and yeast. (Started on Vanco/Sanjeev). Will have GOC discussion with family about overall prognosis and possible third extubation vs trach. If extubated again, will give stricter blood pressure control (labetalol) and a dose of Lasix prior to extubation.
Critically ill on vent/norepi with PNA, sarcoid, AICD.    Frequent bedside visits with therapy change today.   Crit Care Time Today 35 min+    Patient examined and case reviewed in detail on bedside rounds
h/o sarcoidosis (unclear if biopsy proven and sites of involvement, although has been on plaquenil which suggests cutaneous involvement and has AICD/PPM to suggest cardiac involvement)  septic shock  acute hypercapneic respiratory failure secondary to pulmonary edema and basilar PNA  CXR reveals improvement in pulmonary edema with residual basilar infiltrates    lung protective ventilation  levophed for MAP > 65  zosyn, vanc; stop azithro if legionella negative  wean stress dose steroids  SAT/SBT tomorrow pending pressor requirements

## 2018-09-24 LAB — FUNGUS SPEC QL CULT: SIGNIFICANT CHANGE UP

## 2018-10-02 LAB — FUNGUS SPEC QL CULT: SIGNIFICANT CHANGE UP

## 2018-10-11 ENCOUNTER — APPOINTMENT (OUTPATIENT)
Dept: OPHTHALMOLOGY | Facility: CLINIC | Age: 63
End: 2018-10-11

## 2018-11-12 PROBLEM — G45.9 TRANSIENT CEREBRAL ISCHEMIC ATTACK, UNSPECIFIED: Chronic | Status: ACTIVE | Noted: 2018-08-25

## 2018-11-12 PROBLEM — Z95.0 PRESENCE OF CARDIAC PACEMAKER: Chronic | Status: ACTIVE | Noted: 2018-08-25

## 2018-11-12 PROBLEM — D86.9 SARCOIDOSIS, UNSPECIFIED: Chronic | Status: ACTIVE | Noted: 2018-08-25

## 2018-11-14 ENCOUNTER — APPOINTMENT (OUTPATIENT)
Dept: OPHTHALMOLOGY | Facility: CLINIC | Age: 63
End: 2018-11-14

## 2018-11-20 ENCOUNTER — APPOINTMENT (OUTPATIENT)
Dept: OPHTHALMOLOGY | Facility: CLINIC | Age: 63
End: 2018-11-20
Payer: MEDICARE

## 2018-11-20 PROCEDURE — 92014 COMPRE OPH EXAM EST PT 1/>: CPT

## 2018-12-14 PROBLEM — Z00.00 ENCOUNTER FOR PREVENTIVE HEALTH EXAMINATION: Noted: 2018-12-14

## 2018-12-20 ENCOUNTER — APPOINTMENT (OUTPATIENT)
Dept: OPHTHALMOLOGY | Facility: CLINIC | Age: 63
End: 2018-12-20

## 2019-01-17 ENCOUNTER — APPOINTMENT (OUTPATIENT)
Dept: OPHTHALMOLOGY | Facility: CLINIC | Age: 64
End: 2019-01-17
Payer: MEDICARE

## 2019-01-17 PROCEDURE — 92012 INTRM OPH EXAM EST PATIENT: CPT

## 2019-01-17 PROCEDURE — 92015 DETERMINE REFRACTIVE STATE: CPT

## 2019-01-17 PROCEDURE — 92083 EXTENDED VISUAL FIELD XM: CPT

## 2019-01-17 PROCEDURE — 92134 CPTRZ OPH DX IMG PST SGM RTA: CPT

## 2019-03-21 NOTE — PROCEDURE NOTE - ATTENDING PROVIDER
What Type Of Note Output Would You Prefer (Optional)?: Standard Output
Hpi Title: Evaluation of Skin Lesions
Dr. Patel
Dr. Williamson
Dr Cornell
Dr Dayami Jay

## 2019-06-13 ENCOUNTER — APPOINTMENT (OUTPATIENT)
Dept: OPHTHALMOLOGY | Facility: CLINIC | Age: 64
End: 2019-06-13

## 2019-11-19 ENCOUNTER — NON-APPOINTMENT (OUTPATIENT)
Age: 64
End: 2019-11-19

## 2019-11-19 ENCOUNTER — APPOINTMENT (OUTPATIENT)
Dept: OPHTHALMOLOGY | Facility: CLINIC | Age: 64
End: 2019-11-19
Payer: MEDICARE

## 2019-11-19 PROCEDURE — 92014 COMPRE OPH EXAM EST PT 1/>: CPT

## 2021-03-16 NOTE — CHART NOTE - NSCHARTNOTEFT_GEN_A_CORE
MICU Transfer Note    Transfer from: MICU    Transfer to: (  ) Medicine    (  ) Telemetry     (  X ) RCU        (    ) Palliative         (   ) Stroke Unit          (   ) __________________    Accepting Physician:  Signout given to:     MICU COURSE: 61 y/o F w/ a pmh significant for Sarcoidosis (Dx 1986) w/ recent PPM placement May 2018 presenting to the ED w/ a chief complaint of SOB. Pt was in her usual state of health up until day of admission where she experienced sudden onset SOB and unresponsiveness. EMS was called by family and she was noted to be hypoxic 70's and hypotensive SBP 90 en route to ED. In the ED, pt was obtunded and hypoxic despite being on non rebreather. VBG 6.97/110/27/15 w/ CXR showing evidence of pulmonary edema. She was intubated for hypercapnic respiratory failure and admitted to MICU for further management and care.    POCUS showed evidence of bibasilar consolidations concerning for possible PNA. She completed a 7 day course of Vancomycin and Zosyn. Pt was extubated and re-intubated x 2 in setting of significant stridor and respiratory distress likely in setting of flash pulmonary edema. Following reintubation on September 1st, bronchoscopy was performed w/ concern for aspiration PNA. She was started on a course of Vancomycin and Meropenem (Day 4/7). Bronch cx grew MRSA. On 9/3 pt was successfully extubated to nasal cannula and then room air. She was hemodynamically stable for transfer to RCU.           ASSESSMENT & PLAN:   61 y/o F w/ a pmh significant for Sarcoidosis, TIA and PPM who initially presented to the ED w/ a chief complaint of SOB and AMS- intubated and admitted to MICU due to hypoxic/hypercapnic respiratory failure in setting of pulmonary edema and basilar PNA now stable for transfer to RCU.      #Neuro  - AAOx3  - Off sedation, no active issues      #Resp  - s/p extubation yesterday evening now satting well on room air.   - Initially intubated for hypoxic/hypercapnic respiratory failure 2/2 bibasilar consolidations seen on POCUS concerning for PNA  - c/w Albuterol treatments and supplemental O2 prn  - c/w Prednisone 5mg PO QD for Sarcoidosis      #Cardio   - Off vasopressor support. Required Nitro gtt prior to extubation yesterday, now normotensive off gtt.   - c/w Lisinopril 5mg PO QD  - PPM placed 3 months ago in setting of frequent syncopal episodes- presumed Cardiac Sarcoid w/ significant improvement in symptoms.      #ID  - Bronch cultures growing MRSA now on Vancomycin and Meropenem Day 4/7      #Metabolic  - No active issues  - Monitor Cr, urine output     FEN/GI  - ADAT now on clear liquid diet tolerating well.     #Prophylaxis   - Tiarax        Adalid Bee MD  Internal Medicine Resident, PGY-2  [id number] POSTPARTUM ROUNDING NOTE - VAGINAL DELIVERY    Subjective   PPD #1 from spontaneous vaginal delivery. Doing well. Pain controlled. Voiding freely. Ambulating. Denies flatus, BM. Has had some nausea, no vomiting. Tolerating regular diet. Lochia appropriate.  Denies F/C, CP, SOB, dysuria, calf tenderness.    *Addendum: Patient had F of 101.5 shortly after bedside visit    Objective     Vitals:    03/15/21 2316 03/15/21 2331 03/15/21 2344 03/15/21 2358   BP: 130/77 126/72 134/65 117/73   BP Location: RUE - Right upper extremity RUE - Right upper extremity RUE - Right upper extremity RUE - Right upper extremity   Patient Position: Sitting Sitting Sitting Sitting   Pulse: 82 78 82 84   Resp:    16   Temp:    99.5 °F (37.5 °C)   TempSrc:    Oral   SpO2:       Weight:       Height:       LMP: 2020       I/O:     Intake/Output Summary (Last 24 hours) at 3/16/2021 0043  Last data filed at 3/15/2021 2324  Gross per 24 hour   Intake 2263.84 ml   Output 377 ml   Net 1886.84 ml        General: NAD  Resp: CTAB. Non-labored  CV: RRR. Pedal pulses present bilaterally.  Abdomen: Soft, mildly tender. Fundus firm and appropriately below umbilicus  Neurological: A&O x3.  Skin: Warm, dry, normal for ethnicity.   Psychiatric: Cooperative, normal mood and affect    Assessment and Plan   Gifty Gresham is a 32 year old  on PPD #1 s/p spontaneous vaginal delivery. Doing well postpartum. Pregnancy uncomplicated    #s/p    - CV: VSS, HDS. Has some mild range BP's immediately following delivery, otherwise BP's wnl postpartum  - GI: Tolerating gen diet. Bowel regimen, antiemetics ordered.   - : Voiding freely. UOP adequate  - Pain: Controlled  - Heme: antepartum hgb 12.3 -> pending pp hgb   HGB (g/dL)   Date Value   03/15/2021 12.3     #PP Fever  - F of 101.5, pulse 103, BP 97.67, fundus mildly tender and warm  -will start on Zosyn for pp endometritis. Plan d/w Hellen Soriano     - Continue routine postpartum care  - Rh: A  Rh Positive positive. RhoGAM not indicated.  - Rubella: Immune. Varicella: Immune  - DVT ppx: Encourage ambulation. SCDs while in bed  - Discharge: anticipate d/c 24 hrs s/p delivery     Attending: MD HI Larson MD  Appreciate above note and assessment.  Patient now on antibiotics for R/O pp endometritis.  H/H this AM 11.4/34.0   MICU Transfer Note    Transfer from: MICU    Transfer to: (  ) Medicine    (  ) Telemetry     ( X ) RCU        (    ) Palliative         (   ) Stroke Unit          (   ) __________________    Accepting Physician:  Signout given to:     MICU COURSE: 59 y/o F w/ a pmh significant for Sarcoidosis (Dx 1986) w/ recent PPM placement May 2018 presenting to the ED w/ a chief complaint of SOB. Pt was in her usual state of health up until day of admission where she experienced sudden onset SOB and unresponsiveness. EMS was called by family and she was noted to be hypoxic 70's and hypotensive SBP 90 en route to ED. In the ED, pt was obtunded and hypoxic despite being on non rebreather. VBG 6.97/110/27/15 w/ CXR showing evidence of pulmonary edema. She was intubated for hypercapnic respiratory failure and admitted to MICU for further management and care.    POCUS showed evidence of bibasilar consolidations concerning for possible PNA. She completed a 7 day course of Vancomycin and Zosyn. Pt was extubated and re-intubated x 2 in setting of significant stridor and respiratory distress likely 2/2 flash pulmonary edema. Following reintubation on September 1st, there was concern for aspiration PNA for which bronchoscopy was performed and cultures grew MRSA. She was started on a course of Vancomycin and Meropenem (Day 4/7). On 9/3 pt was successfully extubated to nasal cannula and then room air. She was hemodynamically stable for transfer to RCU.           ASSESSMENT & PLAN:   59 y/o F w/ a pmh significant for Sarcoidosis, TIA and PPM who initially presented to the ED w/ a chief complaint of SOB and AMS- intubated and admitted to MICU due to hypoxic/hypercapnic respiratory failure in setting of pulmonary edema and basilar PNA now stable for transfer to RCU.      #Neuro  - AAOx3  - Off sedation, no active issues      #Resp  - s/p extubation yesterday evening now satting well on room air.   - Initially intubated for hypoxic/hypercapnic respiratory failure 2/2 bibasilar consolidations seen on POCUS concerning for PNA  - c/w Albuterol treatments and supplemental O2 prn  - c/w Prednisone 5mg PO QD for Sarcoidosis      #Cardio   - Off vasopressor support. Required Nitro gtt prior to extubation yesterday, now normotensive off gtt.   - c/w Lisinopril 5mg PO QD  - PPM placed 3 months ago in setting of frequent syncopal episodes- presumed Cardiac Sarcoid w/ significant improvement in symptoms.      #ID  - Bronch cultures growing MRSA now on Vancomycin and Meropenem Day 4/7      #Metabolic  - No active issues  - Monitor Cr, urine output     FEN/GI  - ADAT now on clear liquid diet tolerating well.     #Prophylaxis   - Lovenox        Adalid Bee MD  Internal Medicine Resident, PGY-2  [id number] MICU Transfer Note    Transfer from: MICU    Transfer to: (  ) Medicine    (  ) Telemetry     ( X ) RCU        (    ) Palliative         (   ) Stroke Unit          (   ) __________________    Accepting Physician:  Signout given to:     MICU COURSE: 61 y/o F w/ a pmh significant for Sarcoidosis (Dx 1986) w/ recent PPM placement May 2018 presenting to the ED w/ a chief complaint of SOB. Pt was in her usual state of health up until day of admission where she experienced sudden onset SOB and unresponsiveness. EMS was called by family and she was noted to be hypoxic 70's and hypotensive SBP 90 en route to ED. In the ED, pt was obtunded and hypoxic despite being on non rebreather. VBG 6.97/110/27/15 w/ CXR showing evidence of pulmonary edema. She was intubated for hypercapnic respiratory failure and admitted to MICU for further management and care.    POCUS showed evidence of bibasilar consolidations concerning for possible PNA. She completed a 7 day course of Vancomycin and Zosyn. Pt was extubated and re-intubated x 2 in setting of significant stridor and respiratory distress likely 2/2 flash pulmonary edema. Following reintubation on September 1st, there was concern for aspiration PNA for which bronchoscopy was performed and cultures grew MRSA. She was started on a course of Vancomycin and Meropenem (Day 4/7). On 9/3 pt was successfully extubated to nasal cannula and then room air. She was hemodynamically stable for transfer to RCU.           ASSESSMENT & PLAN:   61 y/o F w/ a pmh significant for Sarcoidosis, TIA and PPM who initially presented to the ED w/ a chief complaint of SOB and AMS- intubated and admitted to MICU due to hypoxic/hypercapnic respiratory failure in setting of pulmonary edema and basilar PNA now stable for transfer to RCU.      #Neuro  - AAOx3  - Off sedation, no active issues      #Resp  - s/p extubation yesterday evening now satting well on room air.   - Initially intubated for hypoxic/hypercapnic respiratory failure 2/2 bibasilar consolidations seen on POCUS concerning for PNA  - c/w Albuterol treatments and supplemental O2 prn  - c/w Prednisone 5mg PO QD for Sarcoidosis      #Cardio   - Off vasopressor support. Required Nitro gtt prior to extubation yesterday, now normotensive off gtt.   - c/w Lisinopril 5mg PO QD  - PPM placed 3 months ago in setting of frequent syncopal episodes- presumed Cardiac Sarcoid w/ significant improvement in symptoms.      #ID  - Bronch cultures growing MRSA now on Vancomycin and Meropenem Day 4/7      #Metabolic  - No active issues  - Monitor Cr, urine output     FEN/GI  - ADAT now on clear liquid diet tolerating well.     #Prophylaxis   - Lovenox    To do: continue abx for MRS in sputum day 4/7        Adalid Bee MD  Internal Medicine Resident, PGY-2  [id number] MICU Transfer Note    Transfer from: MICU    Transfer to: (  ) Medicine    (  ) Telemetry     ( X ) RCU        (    ) Palliative         (   ) Stroke Unit          (   ) __________________    Accepting Physician:  Signout given to:     MICU COURSE: 59 y/o F w/ a pmh significant for Sarcoidosis (Dx 1986) w/ recent PPM placement May 2018 presenting to the ED w/ a chief complaint of SOB. Pt was in her usual state of health up until day of admission where she experienced sudden onset SOB and unresponsiveness. EMS was called by family and she was noted to be hypoxic 70's and hypotensive SBP 90 en route to ED. In the ED, pt was obtunded and hypoxic despite being on non rebreather. VBG 6.97/110/27/15 w/ CXR showing evidence of pulmonary edema. She was intubated for hypercapnic respiratory failure and admitted to MICU for further management and care.    POCUS showed evidence of bibasilar consolidations concerning for possible PNA. She completed a 7 day course of Vancomycin and Zosyn. Pt was extubated and re-intubated x 2 in setting of significant stridor and respiratory distress likely 2/2 flash pulmonary edema. Following reintubation on September 1st, there was concern for aspiration PNA for which bronchoscopy was performed and cultures grew MRSA. She was started on a course of Vancomycin and Meropenem (Day 4/7). On 9/3 pt was successfully extubated to nasal cannula and then room air. She was hemodynamically stable for transfer to RCU.           ASSESSMENT & PLAN:   59 y/o F w/ a pmh significant for Sarcoidosis, TIA and PPM who initially presented to the ED w/ a chief complaint of SOB and AMS- intubated and admitted to MICU due to hypoxic/hypercapnic respiratory failure in setting of pulmonary edema and basilar PNA now stable for transfer to RCU.      #Neuro  - AAOx3  - Off sedation, no active issues      #Resp  - s/p extubation yesterday evening now satting well on 2L NC  - Initially intubated for hypoxic/hypercapnic respiratory failure 2/2 bibasilar consolidations seen on POCUS concerning for PNA  - c/w Albuterol treatments and supplemental O2 prn  - c/w Prednisone 5mg PO QD for Sarcoidosis      #Cardio   - Off vasopressor support. Required Nitro gtt prior to extubation yesterday, now normotensive off gtt.   - c/w Lisinopril 5mg PO QD  - PPM placed 3 months ago in setting of frequent syncopal episodes- presumed Cardiac Sarcoid w/ significant improvement in symptoms.      #ID  - Bronch cultures growing MRSA now on Vancomycin and Meropenem Day 4/7      #Metabolic  - No active issues  - Monitor Cr, urine output     FEN/GI  - ADAT now on clear liquid diet tolerating well.     #Prophylaxis   - Lovenox    To do: continue abx for MRS in sputum day 4/7        Adalid Bee MD  Internal Medicine Resident, PGY-2  [id number]

## 2021-06-14 NOTE — ED PROVIDER NOTE - NS ED MD DISPO SPECIAL CONSIDERATION1
Spoke with sibling Saira for follow up on travel plans for [place]. Saira states it is too to make the travel plans and they will not be going ahead with this. Saira states her sister is not wanting her involved much. Pt is in need of a . She is sleeping all day and not taking her medications. She is needing additional help without the assist of her sister. Pt was scheduled for SW visit on 12/1 for a phone call pending  approval. CG will change appt upon request. CG will call pt on 11/30 to remind.   None

## 2022-05-20 NOTE — H&P ADULT - NSHPSOURCEINFORD_GEN_ALL_CORE
Chart(s) History of Present Illness:   Crys is a 28 year old       female seen today for multiple concerns.    She desires UTI testing. Her urine \"gets a weird odor\", noticed most the last few days. She has urinary urgency.    She has currently been bleeding for 20 days. She had been having regular cycles otherwise. She used Plan B about 1.5 months ago. She is bleeding very heavy each of these days. She is feeling lightheaded, bruising easily.  She also had extremely easy bruising during COVID infection, reports that her platelets and ferritin “plummeted”.    She wonders if she has BV. She has no strange discharge. She has not had STD testing yet (see below).  She wonders this because of the odor she smells with her urine.    She lost weight during Covid infection, has lost 12kg since she was in last. She needed monoclonal antibodies due to the severity of her COVID infection, she has underlying significant asthma.     She is no longer with her previous partner, the father of her children.  She found out that he was cheating, involved with pornography, possibly involved with men.  He has not been around or seen the children in 4 months.  He is providing no support.  She has had one new partner, \"he was tested and didn't have anything\". She didn't use a condom, used Plan B.     I have reviewed the patient's medications, allergies, and problem list, updating these as appropriate.  See Histories section of the electronic medical record for a display of this information.    PHYSICAL EXAM:   Visit Vitals  /74 (BP Location: LUE - Left upper extremity, Patient Position: Sitting, Cuff Size: Regular)   Ht 5' 2\" (1.575 m)   Wt 48.2 kg (106 lb 4.2 oz)   LMP 2022 (Approximate)   Breastfeeding No   BMI 19.44 kg/m²     General: Well-developed and well-nourished, appropriately groomed.  Psychiatric: Alert and oriented to person, place and time with appropriate affect and mood.  Gynecologic:  Normal external genitalia.   Speculum examination reveals no vaginal or cervical lesions.  There is a small amount of liquid blood in the vaginal vault.  Pelvic support and estrogenization are adequate.    ASSESSMENT & PLAN:   28-year-old female with multiple concerns.    Regarding her urinary odor and urgency, urinalysis is obtained today and sent for stat evaluation.  Wet mount is also obtained due to her concern of the odor possibly being from BV.    STD testing is obtained due to infidelity from her previous long-term partner as well as being with a new partner without a condom.    Pap smear is updated, last in our system in 2018. She reports she did have this done within outside PCP and some “atypical cells” were seen so she had planned to repeat this (looks like at [facility], [facility], no HPV testing can be seen).    Regarding her ongoing bleeding, there are multiple possible etiologies of this including her severe COVID infection with weight loss, significant stress recently, use of Plan B, etc.  A 1 month trial of OCP pill pack is recommended to try to see if we can regulate the bleeding for her.  She worries that these may affect her mood but we discussed that, with how heavy she describes her bleeding and the symptoms of anemia she is having, we really need to do something to try to get this to stop and likely anything we do at this point would need to be hormonal.  She is therefore open to this, a prescription is sent.  CBC and hcg are ordered to be completed today.    We will notify her of all results and follow-up plan when available.    Greater than 30 minutes spent in total patient care today.

## 2022-07-14 NOTE — ED PROVIDER NOTE - CROS ED RESP ALL NEG
Consent was obtained and risks were reviewed including but not limited to scarring, infection, bleeding, scabbing, incomplete removal, and allergy to anesthesia. Render Post-Care Instructions In Note?: no Extraction Method: 11 blade and comedo extractor Post-Care Instructions: I reviewed with the patient in detail post-care instructions. Patient is to keep the treatment areas dry overnight, and then apply bacitracin twice daily until healed. Patient may apply hydrogen peroxide soaks to remove any crusting. Prep Text (Optional): Prior to removal the treatment areas were prepped in the usual fashion. Acne Type: Comedonal Lesions Detail Level: Detailed negative...

## 2022-07-22 NOTE — SWALLOW VFSS/MBS ASSESSMENT ADULT - COMMENTS
No The patient was received in the radiology suite this AM, at which time she was alert, cooperative, and communicative. Patient currently with supplemental O2 via NC in place. The patient is known to this department as she was seen for a clinical bedside swallow evaluation on 9/5/18 (please see full report for details), at which time oral nutrition/hydration was contraindicated and a cinesophagram was recommended.     Per charting, the patient is a 59 y/o F with sarcoidosis, PPM/AICD and h/o TIA found obtunded at home with shortness of breath secondary to acute respiratory failure with hypercapnea and hypoxia, found to have bibasilar pulmonary consolidations. The patient was intubated on 8/29 and then again on 9/1 with subsequent extubation on 9/3. Recent CXR revealed, "There is blunting of the right costophrenic angle as on the prior study suggestive of a small right pleural effusion. There is suggestion of a trace left pleural effusion. There are left mid and lower lung linear opacities related to atelectasis and/or scarring. Patchy areas of increased density within the right mid to lower lung are noted with interval increase in size and or density since April 30, 2013 likely related to areas of atelectasis and/or scarring or related to the patient's given history of sarcoidosis." Discussed results and recommendations from this evaluation with the patient and call out to MD.

## 2023-04-25 NOTE — PROGRESS NOTE ADULT - ALLERGIC/IMMUNOLOGIC
[Joint Pain] : joint pain [Negative] : Endocrine [Joint Stiffness] : no joint stiffness [Joint Swelling] : no joint swelling [Fever] : no fever [Chills] : no chills negative

## 2023-05-28 NOTE — ED ADULT TRIAGE NOTE - MEANS OF ARRIVAL
stretcher PRINCIPAL DISCHARGE DIAGNOSIS  Diagnosis: Hematuria  Assessment and Plan of Treatment: You were admitted to the hospital for blood in your urine. You were found to have a positive urinalysis and were treated for a urinary tract infection even though you did not have symptoms. Additionally, it is possible the blood in urine is a side effect from the medication cituxan. Please follow up with your urology for further evaluation.      SECONDARY DISCHARGE DIAGNOSES  Diagnosis: Acute kidney injury superimposed on CKD  Assessment and Plan of Treatment: You came into the hospital with acute kidney injury which improved on bumex. Please see your nephrologist in 1-2 weeks.     PRINCIPAL DISCHARGE DIAGNOSIS  Diagnosis: Hematuria  Assessment and Plan of Treatment: You were admitted to the hospital for blood in your urine. You were found to have a positive urinalysis and were treated for a urinary tract infection even though you did not have symptoms. Additionally, it is possible the blood in urine is a side effect from the medication cituxan. You had no additional episodes of bluudy urine. Urology was consulted and recommended that you have outpatient workup done after marcelo discharged from the hospital. Please follow up with urology when you are dischared from the hospital. Please seek immediate medical attention if you notice blood in your urine, feely dizzy or faint, or have belly pain.      SECONDARY DISCHARGE DIAGNOSES  Diagnosis: Lupus nephritis  Assessment and Plan of Treatment: You were admitted to the hospital with urine with lots of protein, blood in your urine, and swelling in your lower extremities. Nephrology was consulted and you were started on a medication to help get rid of the swelling (Bumex 1 mg three times a day). Lupus nephritis can cause   - Swelling – This can be in the hands, face, feet, belly, or around the eyes.  - Weight changes – Many people gain weight, but some people lose weight without trying to.  - Tiredness  - Urine that looks brown or foamy  - Urinating less often than normal  - High blood pressure (higher than 130/80)  You were also started on IV steroids (solumedrol) and albumin while you were in the hospital to help lower your protein in your urine.   Please follow up with your nephrologist within 1 week of being discharged. Please seek immediate medical assistance if you experience sudden swelling in your legs or body, foamy/bloody urine, or high blood pressures.     PRINCIPAL DISCHARGE DIAGNOSIS  Diagnosis: Hematuria  Assessment and Plan of Treatment: You were admitted to the hospital for blood in your urine. You were found to have a positive urinalysis and were treated for a urinary tract infection even though you did not have symptoms. Additionally, it is possible the blood in urine is a side effect from the medication cituxan. You had no additional episodes of bluudy urine. Urology was consulted and recommended that you have outpatient workup done after marcelo discharged from the hospital. Please follow up with urology when you are discharged from the hospital. Please seek immediate medical attention if you notice blood in your urine, feely dizzy or faint, or have belly pain.      SECONDARY DISCHARGE DIAGNOSES  Diagnosis: Lupus nephritis  Assessment and Plan of Treatment: You were admitted to the hospital with urine with lots of protein, blood in your urine, and swelling in your lower extremities. Nephrology was consulted and you were started on a medication to help get rid of the swelling (Bumex 2 mg two times a day). Rheumatology was also consulted and recommended that you continue your Prednisone (50mg once a day). Please make sure to take these medications as prescribed.  - Swelling – This can be in the hands, face, feet, belly, or around the eyes.  - Weight changes – Many people gain weight, but some people lose weight without trying to.  - Tiredness  - Urine that looks brown or foamy  - Urinating less often than normal  - High blood pressure (higher than 130/80)  You were also started on IV steroids (solumedrol) and albumin while you were in the hospital to help lower your protein in your urine.   Please follow up with your nephrologist d rheumatology within 1 week of being discharged. Please seek immediate medical assistance if you experience sudden swelling in your legs or body, foamy/bloody urine, or high blood pressures.

## 2023-11-07 NOTE — ED PROVIDER NOTE - NS ED ATTENDING STATEMENT MOD
I have personally seen and examined this patient.  I have fully participated in the care of this patient. I have reviewed all pertinent clinical information, including history, physical exam, plan and the Resident’s note and agree except as noted. independent